# Patient Record
Sex: FEMALE | Race: WHITE | HISPANIC OR LATINO | Employment: UNEMPLOYED | URBAN - METROPOLITAN AREA
[De-identification: names, ages, dates, MRNs, and addresses within clinical notes are randomized per-mention and may not be internally consistent; named-entity substitution may affect disease eponyms.]

---

## 2020-06-28 ENCOUNTER — HOSPITAL ENCOUNTER (EMERGENCY)
Facility: HOSPITAL | Age: 35
Discharge: HOME | End: 2020-06-28
Attending: EMERGENCY MEDICINE | Admitting: EMERGENCY MEDICINE

## 2020-06-28 VITALS
OXYGEN SATURATION: 98 % | WEIGHT: 190 LBS | TEMPERATURE: 97.3 F | HEIGHT: 62 IN | BODY MASS INDEX: 34.96 KG/M2 | DIASTOLIC BLOOD PRESSURE: 100 MMHG | HEART RATE: 95 BPM | SYSTOLIC BLOOD PRESSURE: 144 MMHG | RESPIRATION RATE: 18 BRPM

## 2020-06-28 DIAGNOSIS — F32.A DEPRESSION, UNSPECIFIED DEPRESSION TYPE: ICD-10-CM

## 2020-06-28 DIAGNOSIS — F41.8 SITUATIONAL ANXIETY: Primary | ICD-10-CM

## 2020-06-28 LAB
AMPHET+METHAMPHET UR QL: NEGATIVE
BARBITURATES UR QL SCN: NEGATIVE
BENZODIAZ UR QL SCN: NEGATIVE
CANNABINOIDS SERPL QL: NEGATIVE
COCAINE UR QL: NEGATIVE
ETHANOL BLD-MCNC: <10 MG/DL (ref 0–10)
ETHANOL UR QL: <0.01 %
HCG SERPL QL: NEGATIVE
HOLD SPECIMEN: NORMAL
HOLD SPECIMEN: NORMAL
METHADONE UR QL SCN: NEGATIVE
OPIATES UR QL: NEGATIVE
OXYCODONE UR QL SCN: NEGATIVE
WHOLE BLOOD HOLD SPECIMEN: NORMAL
WHOLE BLOOD HOLD SPECIMEN: NORMAL

## 2020-06-28 PROCEDURE — 90791 PSYCH DIAGNOSTIC EVALUATION: CPT

## 2020-06-28 PROCEDURE — 36415 COLL VENOUS BLD VENIPUNCTURE: CPT

## 2020-06-28 PROCEDURE — 80307 DRUG TEST PRSMV CHEM ANLYZR: CPT

## 2020-06-28 PROCEDURE — 99284 EMERGENCY DEPT VISIT MOD MDM: CPT

## 2020-06-28 PROCEDURE — 84703 CHORIONIC GONADOTROPIN ASSAY: CPT

## 2020-06-28 RX ORDER — LORAZEPAM 1 MG/1
1 TABLET ORAL ONCE
Status: COMPLETED | OUTPATIENT
Start: 2020-06-28 | End: 2020-06-28

## 2020-06-28 RX ORDER — HYDROXYZINE 50 MG/1
50 TABLET, FILM COATED ORAL EVERY 6 HOURS PRN
Qty: 20 TABLET | Refills: 0 | Status: SHIPPED | OUTPATIENT
Start: 2020-06-28 | End: 2021-02-04

## 2020-06-28 RX ADMIN — LORAZEPAM 1 MG: 1 TABLET ORAL at 18:58

## 2021-05-05 ENCOUNTER — OFFICE VISIT (OUTPATIENT)
Dept: OBSTETRICS AND GYNECOLOGY | Facility: CLINIC | Age: 36
End: 2021-05-05

## 2021-05-05 VITALS
SYSTOLIC BLOOD PRESSURE: 126 MMHG | WEIGHT: 205.8 LBS | HEIGHT: 62 IN | DIASTOLIC BLOOD PRESSURE: 78 MMHG | BODY MASS INDEX: 37.87 KG/M2

## 2021-05-05 DIAGNOSIS — Z98.890 HISTORY OF REVERSAL OF TUBAL LIGATION: ICD-10-CM

## 2021-05-05 DIAGNOSIS — Z87.59 H/O ONE MISCARRIAGE: ICD-10-CM

## 2021-05-05 DIAGNOSIS — Z01.419 ROUTINE GYNECOLOGICAL EXAMINATION: Primary | ICD-10-CM

## 2021-05-05 DIAGNOSIS — Z31.9 INFERTILITY MANAGEMENT: ICD-10-CM

## 2021-05-05 LAB
B-HCG UR QL: NEGATIVE
BILIRUB BLD-MCNC: NEGATIVE MG/DL
CLARITY, POC: CLEAR
COLOR UR: YELLOW
GLUCOSE UR STRIP-MCNC: NEGATIVE MG/DL
INTERNAL NEGATIVE CONTROL: NEGATIVE
INTERNAL POSITIVE CONTROL: POSITIVE
KETONES UR QL: NEGATIVE
LEUKOCYTE EST, POC: NEGATIVE
Lab: 55
NITRITE UR-MCNC: NEGATIVE MG/ML
PH UR: 5 [PH] (ref 5–8)
PROT UR STRIP-MCNC: NEGATIVE MG/DL
RBC # UR STRIP: NEGATIVE /UL
SP GR UR: 1 (ref 1–1.03)
UROBILINOGEN UR QL: NORMAL

## 2021-05-05 PROCEDURE — 99203 OFFICE O/P NEW LOW 30 MIN: CPT | Performed by: OBSTETRICS & GYNECOLOGY

## 2021-05-05 PROCEDURE — 81025 URINE PREGNANCY TEST: CPT | Performed by: OBSTETRICS & GYNECOLOGY

## 2021-05-05 RX ORDER — TOPIRAMATE 50 MG/1
TABLET, FILM COATED ORAL
COMMUNITY
End: 2021-05-05

## 2021-05-05 RX ORDER — SELENIUM 50 MCG
TABLET ORAL
COMMUNITY
End: 2021-05-05

## 2021-05-05 NOTE — PROGRESS NOTES
"EVALUATION AND MANAGEMENT ENCOUNTER    Theresa Nagy  Patient new to examiner? no  New problem to examiner? Yes  Patient referred? No    -----------------------------------------------------HISTORY---------------------------------------------------    Chief Complaint:   Chief Complaint   Patient presents with   • Follow-up     infertility       HPI:  Theresa Nagy is a 36 y.o.  with Patient's last menstrual period was 2021 (exact date). here to discuss and manage why she is not getting pregnant.  Pt has had 3 pregnancies before her tubal and a miscarriage after her tubal reversal. Pt had the miscarriage about 8 months ago.  She really wants to get pregnant.    Pt states she is having no other problems.  Her periods are 28 days apart, and is not using an ovulation predictor kit.     History of Present Illness     Theresa Nagy  reports that she has never smoked. She has never used smokeless tobacco..           ROS:  Review of Systems   Constitutional: Negative.    HENT: Negative.    Eyes: Negative.    Respiratory: Negative.    Cardiovascular: Negative.    Gastrointestinal: Negative.    Endocrine: Negative.    Genitourinary: Negative.    Musculoskeletal: Negative.    Skin: Negative.    Allergic/Immunologic: Negative.    Neurological: Negative.    Hematological: Negative.    Psychiatric/Behavioral: Negative.        Patient reports that she is not currently experiencing any symptoms of urinary incontinence.      noTESTED FOR CHLAMYDIA?  -----------------------------------------------PHYSICAL EXAM----------------------------------------------    Vital Signs: /78   Ht 157.5 cm (62.01\")   Wt 93.4 kg (205 lb 12.8 oz)   LMP 2021 (Exact Date)   Breastfeeding No   BMI 37.63 kg/m²    Flowsheet Rows      First Filed Value   Admission Height  157.5 cm (62.01\") Documented at 2021 1353   Admission Weight  93.4 kg (205 lb 12.8 oz) Documented at 2021 1353          Physical Exam  Vitals and nursing " note reviewed.   Constitutional:       Appearance: She is well-developed.   HENT:      Head: Normocephalic and atraumatic.   Cardiovascular:      Rate and Rhythm: Normal rate.   Pulmonary:      Effort: Pulmonary effort is normal.   Abdominal:      General: There is no distension.      Palpations: Abdomen is soft. There is no mass.      Tenderness: There is no abdominal tenderness. There is no guarding.   Genitourinary:     Vagina: No vaginal discharge.   Musculoskeletal:         General: No tenderness or deformity. Normal range of motion.      Cervical back: Normal range of motion.   Skin:     General: Skin is warm and dry.      Coloration: Skin is not pale.      Findings: No erythema or rash.   Neurological:      Mental Status: She is alert and oriented to person, place, and time.   Psychiatric:         Behavior: Behavior normal.         Thought Content: Thought content normal.         Judgment: Judgment normal.         -----------------------------------------------MEDICAL DECISION MAKING-----------------------------        DATA Review & labs ordered:     1.   Lab Results (last 24 hours)     Procedure Component Value Units Date/Time    POC Pregnancy, Urine [[id number]]  (Normal) Collected: 05/05/21 1404    Specimen: Urine Updated: 05/05/21 1405     HCG, Urine, QL Negative     Lot Number [id number]     Internal Positive Control Positive     Internal Negative Control Negative    POC Urinalysis Dipstick [[id number]]  (Normal) Collected: 05/05/21 1404    Specimen: Urine Updated: 05/05/21 1404     Color Yellow     Clarity, UA Clear     Glucose, UA Negative mg/dL      Bilirubin Negative     Ketones, UA Negative     Specific Gravity  1.005     Blood, UA Negative     pH, Urine 5.0     Protein, POC Negative mg/dL      Urobilinogen, UA Normal     Leukocytes Negative     Nitrite, UA Negative        2.   Imaging Results (Last 24 Hours)     ** No results found for the last 24 hours. **        3.   ECG/EMG Results (most recent)     None         4. Old records reviewed? No  5. Old records ordered?  No  6. Labs ordered?: Yes:  urinalysis: clr  7. Imaging other than ultrasound ordered?: No        Reviewed with other physician? no     8. Ultrasound ordered and reviewed? No  9. Diagnoses and/or chronic conditions reviewed:      Diagnoses and all orders for this visit:    1. Routine gynecological examination (Primary)  -     POC Urinalysis Dipstick  -     POC Pregnancy, Urine    2. Infertility management    3. History of reversal of tubal ligation    4. H/O one miscarriage    Other orders  -     clomiPHENE (CLOMID) 50 MG tablet; Take 1 tablet by mouth Daily. days 5-10 each cycle, rpt x 1 month if no conception  Dispense: 10 tablet; Refill: 0        IMPRESSION/PROBLEM:      Proven fertility, with pregnancy after tubal reversal.  No changes in history. prob age is factor.    (Established problem/s? No, worsening? Yes)    (New Problem/s? Yes, additional workup planned? No)      PLAN:     1. Clomid x 2 cycles.   2. Call after 2nd cycle.    Pt to call for any results from testing promptly if she does not hear from us.     RTO Return if symptoms worsen or fail to improve. .  Instructions and precautions given.     TIME: More than 50% of time spent in counseling and/or coordination of care.Time spent in counseling 30 min.  Counseling included the following topics infertility with options for expectant management v. Clomid (realizing that it is an empiric start due to age and no other workup has been done with proven tubal patency) v. RE referral with prognosis, differential diagnosis, risks, benefits of treatment, instructions, compliance and/or risk reduction and alternatives.       Wilfredo Delcid MD  16:54 EDT  05/05/21

## 2021-06-22 ENCOUNTER — OFFICE VISIT (OUTPATIENT)
Dept: OBSTETRICS AND GYNECOLOGY | Facility: CLINIC | Age: 36
End: 2021-06-22

## 2021-06-22 VITALS
SYSTOLIC BLOOD PRESSURE: 128 MMHG | BODY MASS INDEX: 40.72 KG/M2 | DIASTOLIC BLOOD PRESSURE: 72 MMHG | HEIGHT: 62 IN | WEIGHT: 221.3 LBS

## 2021-06-22 DIAGNOSIS — O20.0 THREATENED ABORTION: ICD-10-CM

## 2021-06-22 DIAGNOSIS — N92.6 MISSED MENSES: Primary | ICD-10-CM

## 2021-06-22 DIAGNOSIS — O09.529 ANTEPARTUM MULTIGRAVIDA OF ADVANCED MATERNAL AGE: ICD-10-CM

## 2021-06-22 LAB
B-HCG UR QL: POSITIVE
BILIRUB BLD-MCNC: NEGATIVE MG/DL
CLARITY, POC: CLEAR
COLOR UR: YELLOW
GLUCOSE UR STRIP-MCNC: NEGATIVE MG/DL
INTERNAL NEGATIVE CONTROL: ABNORMAL
INTERNAL POSITIVE CONTROL: ABNORMAL
KETONES UR QL: NEGATIVE
LEUKOCYTE EST, POC: NEGATIVE
Lab: 55
NITRITE UR-MCNC: NEGATIVE MG/ML
PH UR: 5 [PH] (ref 5–8)
PROT UR STRIP-MCNC: NEGATIVE MG/DL
RBC # UR STRIP: ABNORMAL /UL
SP GR UR: 1 (ref 1–1.03)
UROBILINOGEN UR QL: NORMAL

## 2021-06-22 PROCEDURE — 99214 OFFICE O/P EST MOD 30 MIN: CPT | Performed by: OBSTETRICS & GYNECOLOGY

## 2021-06-22 PROCEDURE — 81025 URINE PREGNANCY TEST: CPT | Performed by: OBSTETRICS & GYNECOLOGY

## 2021-06-22 RX ORDER — PROGESTERONE 200 MG/1
200 CAPSULE ORAL DAILY
Qty: 90 CAPSULE | Refills: 3 | Status: SHIPPED | OUTPATIENT
Start: 2021-06-22 | End: 2021-08-16

## 2021-06-22 RX ORDER — ONDANSETRON 4 MG/1
4 TABLET, FILM COATED ORAL
COMMUNITY
Start: 2021-05-24 | End: 2021-08-16

## 2021-06-22 NOTE — PROGRESS NOTES
OB CONFIRMATION APPOINTMENT    CC- Pt has had a menstrual irregularity    Chief Complaint   Patient presents with   • Amenorrhea     LMP-21        HISTORY OF PRESENT ILLNESS:    Amenorrhea  This is a new problem. The current episode started more than 1 month ago. The problem occurs constantly. The problem has been unchanged. Associated symptoms include fatigue. Pertinent negatives include no abdominal pain or urinary symptoms. Associated symptoms comments: Vaginal bleeding. Nothing aggravates the symptoms. She has tried nothing for the symptoms.       Theresa Nagy is being seen today to confirm she is pregnant.    She is a 36 y.o.  Patient's last menstrual period was 2021..  EDC= 22.  Gestational age is 8+wks     Current obstetric complaints : vaginal bleeding     Prior obstetric issues, potential pregnancy concerns: miscarriage    Family history of genetic issues (includes FOB): none    OFFERED GENETIC TESTING: CfDNA, Cystic fibrosis, Spinal muscular atrophy, Fragile X syndrome: yes    Prior infections concerning in pregnancy (Rash, fever in last 2 weeks): no    HISTORY REVIEWED:      Past Medical History:   Diagnosis Date   • Anxiety    • Depression        Past Surgical History:   Procedure Laterality Date   •  SECTION     • CHOLECYSTECTOMY     • TUBAL ABDOMINAL LIGATION      and reversal         Current Outpatient Medications:   •  ondansetron (ZOFRAN) 4 MG tablet, 4 mg., Disp: , Rfl:   •  Progesterone (Prometrium) 200 MG capsule, Insert 1 capsule into the vagina Daily., Disp: 90 capsule, Rfl: 3    No Known Allergies    Social History     Socioeconomic History   • Marital status: Single     Spouse name: Not on file   • Number of children: Not on file   • Years of education: Not on file   • Highest education level: Not on file   Tobacco Use   • Smoking status: Never Smoker   • Smokeless tobacco: Never Used   Vaping Use   • Vaping Use: Never used   Substance and Sexual Activity   •  "Alcohol use: Never   • Drug use: Never   • Sexual activity: Never       History reviewed. No pertinent family history.    REVIEW OF SYSTEMS:     Review of Systems   Constitutional: Positive for fatigue.   HENT: Negative.    Eyes: Negative.    Respiratory: Negative.    Cardiovascular: Negative.    Gastrointestinal: Negative.  Negative for abdominal pain.   Endocrine: Negative.    Genitourinary: Positive for menstrual problem and vaginal bleeding.   Musculoskeletal: Negative.    Skin: Negative.    Allergic/Immunologic: Negative.    Neurological: Negative.    Hematological: Negative.    Psychiatric/Behavioral: Negative.        Physical Exam     Physical Exam  Vitals and nursing note reviewed.   Constitutional:       Appearance: She is well-developed.   HENT:      Head: Normocephalic and atraumatic.   Cardiovascular:      Rate and Rhythm: Normal rate.   Pulmonary:      Effort: Pulmonary effort is normal.   Abdominal:      General: There is no distension.      Palpations: Abdomen is soft. There is no mass.      Tenderness: There is no abdominal tenderness. There is no guarding.   Genitourinary:     Vagina: No vaginal discharge.   Musculoskeletal:         General: No tenderness or deformity. Normal range of motion.      Cervical back: Normal range of motion.   Skin:     General: Skin is warm and dry.      Coloration: Skin is not pale.      Findings: No erythema or rash.   Neurological:      Mental Status: She is alert and oriented to person, place, and time.   Psychiatric:         Behavior: Behavior normal.         Thought Content: Thought content normal.         Judgment: Judgment normal.             Objective    /72   Ht 157.5 cm (62.01\")   Wt 100 kg (221 lb 4.8 oz)   LMP 04/23/2021   Breastfeeding No   BMI 40.47 kg/m²     Theresa Nagy  reports that she has never smoked. She has never used smokeless tobacco..        U/S:  + cardiac, no hemorrhage.        Assessment    1) Pregnancy at Unknown   Diagnoses and " all orders for this visit:    1. Missed menses (Primary)  -     POC Urinalysis Dipstick  -     POC Pregnancy, Urine    2. Antepartum multigravida of advanced maternal age    3. Threatened     Other orders  -     Progesterone (Prometrium) 200 MG capsule; Insert 1 capsule into the vagina Daily.  Dispense: 90 capsule; Refill: 3         Plan    Patient is on Prenatal vitamins  Problem list reviewed and updated.  Reviewed routine prenatal care with the patient  Zika (travel restrictions/ok to use insect repellant), not to changing cat litter, food restrictions, avoidance of alcohol, tobacco and drugs and saunas/hot tubs.   All questions answered.     I spent 30+ minutes caring for Theresa on this date of service. This time includes time spent by me in the following activities: preparing for the visit, reviewing tests, obtaining and/or reviewing a separately obtained history, performing a medically appropriate examination and/or evaluation, counseling and educating the patient/family/caregiver, ordering medications, tests, or procedures, referring and communicating with other health care professionals, documenting information in the medical record, independently interpreting results and communicating that information with the patient/family/caregiver, care coordination and being present in the u/s for the u/s procedure and explaining results and findings in real time.       RTO Return in about 2 weeks (around 2021) for ob phys w/ pap.    Wilfredo Delcid MD    2021  10:28 EDT

## 2021-07-12 ENCOUNTER — INITIAL PRENATAL (OUTPATIENT)
Dept: OBSTETRICS AND GYNECOLOGY | Facility: CLINIC | Age: 36
End: 2021-07-12

## 2021-07-12 VITALS — WEIGHT: 219 LBS | SYSTOLIC BLOOD PRESSURE: 122 MMHG | BODY MASS INDEX: 40.05 KG/M2 | DIASTOLIC BLOOD PRESSURE: 78 MMHG

## 2021-07-12 DIAGNOSIS — O09.529 ANTEPARTUM MULTIGRAVIDA OF ADVANCED MATERNAL AGE: ICD-10-CM

## 2021-07-12 DIAGNOSIS — Z11.51 SPECIAL SCREENING EXAMINATION FOR HUMAN PAPILLOMAVIRUS (HPV): ICD-10-CM

## 2021-07-12 DIAGNOSIS — Z87.59 HISTORY OF GESTATIONAL HYPERTENSION: ICD-10-CM

## 2021-07-12 DIAGNOSIS — Z34.91 INITIAL OBSTETRIC VISIT IN FIRST TRIMESTER: Primary | ICD-10-CM

## 2021-07-12 DIAGNOSIS — Z87.59 H/O ONE MISCARRIAGE: ICD-10-CM

## 2021-07-12 DIAGNOSIS — Z01.419 PAP SMEAR, LOW-RISK: ICD-10-CM

## 2021-07-12 DIAGNOSIS — Z36.9 ENCOUNTER FOR ANTENATAL SCREENING, UNSPECIFIED: ICD-10-CM

## 2021-07-12 LAB
ALBUMIN SERPL-MCNC: 4.3 G/DL (ref 3.5–5.2)
ALBUMIN/GLOB SERPL: 1.6 G/DL
ALP SERPL-CCNC: 65 U/L (ref 39–117)
ALT SERPL-CCNC: 33 U/L (ref 1–33)
AST SERPL-CCNC: 17 U/L (ref 1–32)
BILIRUB SERPL-MCNC: 0.2 MG/DL (ref 0–1.2)
BUN SERPL-MCNC: 7 MG/DL (ref 6–20)
BUN/CREAT SERPL: 13.2 (ref 7–25)
CALCIUM SERPL-MCNC: 9.7 MG/DL (ref 8.6–10.5)
CHLORIDE SERPL-SCNC: 101 MMOL/L (ref 98–107)
CO2 SERPL-SCNC: 23.3 MMOL/L (ref 22–29)
CREAT SERPL-MCNC: 0.53 MG/DL (ref 0.57–1)
GLOBULIN SER CALC-MCNC: 2.7 GM/DL
GLUCOSE SERPL-MCNC: 96 MG/DL (ref 65–99)
GLUCOSE UR STRIP-MCNC: NEGATIVE MG/DL
POTASSIUM SERPL-SCNC: 3.9 MMOL/L (ref 3.5–5.2)
PROT SERPL-MCNC: 7 G/DL (ref 6–8.5)
PROT UR STRIP-MCNC: NEGATIVE MG/DL
SODIUM SERPL-SCNC: 137 MMOL/L (ref 136–145)
TSH SERPL DL<=0.005 MIU/L-ACNC: 2.51 UIU/ML (ref 0.27–4.2)

## 2021-07-12 PROCEDURE — 99214 OFFICE O/P EST MOD 30 MIN: CPT | Performed by: NURSE PRACTITIONER

## 2021-07-12 NOTE — PATIENT INSTRUCTIONS
Prenatal Care  Prenatal care is health care during pregnancy. It helps you and your unborn baby (fetus) stay as healthy as possible. Prenatal care may be provided by a midwife, a family practice health care provider, or a childbirth and pregnancy specialist (obstetrician).  How does this affect me?  During pregnancy, you will be closely monitored for any new conditions that might develop. To lower your risk of pregnancy complications, you and your health care provider will talk about any underlying conditions you have.  How does this affect my baby?  Early and consistent prenatal care increases the chance that your baby will be healthy during pregnancy. Prenatal care lowers the risk that your baby will be:  · Born early (prematurely).  · Smaller than expected at birth (small for gestational age).  What can I expect at the first prenatal care visit?  Your first prenatal care visit will likely be the longest. You should schedule your first prenatal care visit as soon as you know that you are pregnant. Your first visit is a good time to talk about any questions or concerns you have about pregnancy. At your visit, you and your health care provider will talk about:  · Your medical history, including:  ? Any past pregnancies.  ? Your family's medical history.  ? The baby's father's medical history.  ? Any long-term (chronic) health conditions you have and how you manage them.  ? Any surgeries or procedures you have had.  ? Any current over-the-counter or prescription medicines, herbs, or supplements you are taking.  · Other factors that could pose a risk to your baby, including:  · Your home setting and your stress levels, including:  ? Exposure to abuse or violence.  ? Household financial strain.  ? Mental health conditions you have.  · Your daily health habits, including diet and exercise.  Your health care provider will also:  · Measure your weight, height, and blood pressure.  · Do a physical exam, including a pelvic  and breast exam.  · Perform blood tests and urine tests to check for:  ? Urinary tract infection.  ? Sexually transmitted infections (STIs).  ? Low iron levels in your blood (anemia).  ? Blood type and certain proteins on red blood cells (Rh antibodies).  ? Infections and immunity to viruses, such as hepatitis B and rubella.  ? HIV (human immunodeficiency virus).  · Do an ultrasound to confirm your baby's growth and development and to help predict your estimated due date (MARCUS). This ultrasound is done with a probe that is inserted into the vagina (transvaginal ultrasound).  · Discuss your options for genetic screening.  · Give you information about how to keep yourself and your baby healthy, including:  ? Nutrition and taking vitamins.  ? Physical activity.  ? How to manage pregnancy symptoms such as nausea and vomiting (morning sickness).  ? Infections and substances that may be harmful to your baby and how to avoid them.  ? Food safety.  ? Dental care.  ? Working.  ? Travel.  ? Warning signs to watch for and when to call your health care provider.  How often will I have prenatal care visits?  After your first prenatal care visit, you will have regular visits throughout your pregnancy. The visit schedule is often as follows:  · Up to week 28 of pregnancy: once every 4 weeks.  · 28-36 weeks: once every 2 weeks.  · After 36 weeks: every week until delivery.  Some women may have visits more or less often depending on any underlying health conditions and the health of the baby.  Keep all follow-up and prenatal care visits as told by your health care provider. This is important.  What happens during routine prenatal care visits?  Your health care provider will:  · Measure your weight and blood pressure.  · Check for fetal heart sounds.  · Measure the height of your uterus in your abdomen (fundal height). This may be measured starting around week 20 of pregnancy.  · Check the position of your baby inside your  uterus.  · Ask questions about your diet, sleeping patterns, and whether you can feel the baby move.  · Review warning signs to watch for and signs of labor.  · Ask about any pregnancy symptoms you are having and how you are dealing with them. Symptoms may include:  ? Headaches.  ? Nausea and vomiting.  ? Vaginal discharge.  ? Swelling.  ? Fatigue.  ? Constipation.  ? Any discomfort, including back or pelvic pain.  Make a list of questions to ask your health care provider at your routine visits.  What tests might I have during prenatal care visits?  You may have blood, urine, and imaging tests throughout your pregnancy, such as:  · Urine tests to check for glucose, protein, or signs of infection.  · Glucose tests to check for a form of diabetes that can develop during pregnancy (gestational diabetes mellitus). This is usually done around week 24 of pregnancy.  · An ultrasound to check your baby's growth and development and to check for birth defects. This is usually done around week 20 of pregnancy.  · A test to check for group B strep (GBS) infection. This is usually done around week 36 of pregnancy.  · Genetic testing. This may include blood or imaging tests, such as an ultrasound. Some genetic tests are done during the first trimester and some are done during the second trimester.  What else can I expect during prenatal care visits?  Your health care provider may recommend getting certain vaccines during pregnancy. These may include:  · A yearly flu shot (annual influenza vaccine). This is especially important if you will be pregnant during flu season.  · Tdap (tetanus, diphtheria, pertussis) vaccine. Getting this vaccine during pregnancy can protect your baby from whooping cough (pertussis) after birth. This vaccine may be recommended between weeks 27 and 36 of pregnancy.  Later in your pregnancy, your health care provider may give you information about:  · Childbirth and breastfeeding classes.  · Choosing a  health care provider for your baby.  · Umbilical cord banking.  · Breastfeeding.  · Birth control after your baby is born.  · The hospital labor and delivery unit and how to tour it.  · Registering at the hospital before you go into labor.  Where to find more information  · Office on Women's Health: womenshealth.gov  · American Pregnancy Association: americanpregnancy.org  · March of Dimes: marchofdimes.org  Summary  · Prenatal care helps you and your baby stay as healthy as possible during pregnancy.  · Your first prenatal care visit will most likely be the longest.  · You will have visits and tests throughout your pregnancy to monitor your health and your baby's health.  · Bring a list of questions to your visits to ask your health care provider.  · Make sure to keep all follow-up and prenatal care visits with your health care provider.  This information is not intended to replace advice given to you by your health care provider. Make sure you discuss any questions you have with your health care provider.  Document Released: 12/20/2004 Document Revised: 12/17/2018 Document Reviewed: 12/17/2018  [employer] Interactive Patient Education © 2019 [employer]      Tests and Screening During Pregnancy  Having certain tests and screenings during pregnancy is an important part of your prenatal care. These tests help your health care provider find problems that might affect your pregnancy. Some tests are done for all pregnant women, and some are optional. Most of the tests and screenings do not pose any risks for you or your baby. You may need additional testing if any routine tests indicate a problem.  Tests and screenings done in early pregnancy  Some tests and screenings you can expect to have in early pregnancy include:  · Blood tests, such as:  ? Complete blood count (CBC). This test is done to check your red and white blood cells. It can help identify a risk for anemia, infection, or bleeding.  ? Blood typing. This  test determines your blood type as well as whether you have a certain protein in your red blood cells (Rh factor). If you do not have this protein (Rh negative) and your baby does have it (Rh positive), your body could make antibodies to the Rh factor. This could be dangerous to your baby's health.  ? Tests to check for diseases that can cause birth defects or can be passed to your baby, such as:  § [place] measles (rubella). The test indicates whether you are immune to rubella.  § Hepatitis B and C. All women are tested for hepatitis B. You may also be tested for hepatitis C if you have risk factors for the condition.  § Zika virus infection. You may have a blood or urine test to check for this infection if you or your partner has traveled to an area where the virus occurs.  · Urine testing. A urine sample can be tested for diabetes, protein in your urine, and signs of infection.  · Testing for sexually transmitted infections (STIs), such as HIV, syphilis, and chlamydia.  · Testing for tuberculosis. You may have this skin test if you are at risk for tuberculosis.  · Fetal ultrasound. This is an imaging study of your developing baby. It is done using sound waves and a computer. This test may be done at 11-14 weeks to confirm your pregnancy and help determine your due date.  Tests and screenings done later in pregnancy  Certain tests are done for the first time during later pregnancy. In addition, some of the tests that were done in early pregnancy are repeated at this time. Some common tests you can expect to have later in pregnancy include:  · Rh antibody testing. If you are Rh negative, you will have a blood test at about 28 weeks of pregnancy to see if you are producing Rh antibodies. If you have not started to make antibodies, you will be given an injection to prevent you from making antibodies for the rest of your pregnancy.  · Glucose screening. This tests your blood sugar to find out whether you are developing  the type of diabetes that occurs during pregnancy (gestational diabetes). You may have this screening earlier if you have risk factors for diabetes.  · Screening for group B streptococcus (GBS). GBS is a type of bacteria that may live in your rectum or vagina. You may have GBS without any symptoms. GBS can spread to your baby during birth. This test involves doing a rectal and vaginal swab at 35-37 weeks of pregnancy. If testing is positive for GBS, you may be treated with antibiotic medicine.  · CBC to check for anemia and blood-clotting ability.  · Urine tests to check for protein, which can be a sign of a condition called preeclampsia.  · Fetal ultrasound. This may be repeated at 16-20 weeks to check how your baby is growing and developing.  Screening for birth defects  Some birth defects are caused by abnormal genes passed down through families. Early in your pregnancy, tests can be done to find out if your baby is at risk for a genetic disorder. This testing is optional. The type of testing recommended for you will depend on your family and medical history, your ethnicity, and your age. Testing may include:  · Screening tests. These tests may include an ultrasound, blood tests, or a combination of both. The blood tests are used to check for abnormal genes, and the ultrasound is done to look for early birth defects.  · Carrier screening. This test involves checking the blood or saliva of both parents to see if they carry abnormal genes that could be passed down to a baby.  If genetic screening shows that your baby is at risk for a genetic defect, additional diagnostic testing may be recommended, such as:  · Amniocentesis. This involves testing a sample of fluid from your womb (amniotic fluid).  · Chorionic villus sampling. In this test, a sample of cells from your placenta is checked for abnormal cells.  Unlike other tests done during pregnancy, diagnostic testing does have some risk for your pregnancy. Talk to  your health care provider about the risks and benefits of genetic testing.  Where to find more information  · American Pregnancy Association: americanpregnancy.org/prenatal-testing  · Office on Women's Health: womenshealth.gov/pregnancy  · March of Dimes: marchofdimes.org/pregnancy  Questions to ask your health care provider  · What routine tests are recommended for me?  · When and how will these tests be done?  · When will I get the results of routine tests?  · What do the results of these tests mean for me or my baby?  · Do you recommend any genetic screening tests? Which ones?  · Should I see a genetic counselor before having genetic screening?  Summary  · Having tests and screenings during pregnancy is an important part of your prenatal care.  · In early pregnancy, testing may be done to check blood type, Rh status, and risks for various conditions that can affect your baby.  · Fetal ultrasound may be done in early pregnancy to confirm a pregnancy and later to look for any birth defects.  · Later in pregnancy, tests may include screening for GBS and gestational diabetes.  · Genetic testing is optional. Consider talking to a genetic counselor about this testing.  This information is not intended to replace advice given to you by your health care provider. Make sure you discuss any questions you have with your health care provider.  Document Released: 03/04/2019 Document Revised: 03/04/2019 Document Reviewed: 03/04/2019  [employer]vier Interactive Patient Education © 2019 [employer]      How a Baby Grows During Pregnancy    Pregnancy begins when a male's sperm enters a female's egg (fertilization). Fertilization usually happens in one of the tubes (fallopian tubes) that connect the ovaries to the womb (uterus). The fertilized egg moves down the fallopian tube to the uterus. Once it reaches the uterus, it implants into the lining of the uterus and begins to grow.  For the first 10 weeks, the fertilized egg is called  an embryo. After 10 weeks, it is called a fetus. As the fetus continues to grow, it receives oxygen and nutrients through tissue (placenta) that grows to support the developing baby. The placenta is the life support system for the baby. It provides oxygen and nutrition and removes waste.  Learning as much as you can about your pregnancy and how your baby is developing can help you enjoy the experience. It can also make you aware of when there might be a problem and when to ask questions.  How long does a typical pregnancy last?  A pregnancy usually lasts 280 days, or about 40 weeks. Pregnancy is divided into three periods of growth, also called trimesters:  · First trimester: 0-12 weeks.  · Second trimester: 13-27 weeks.  · Third trimester: 28-40 weeks.  The day when your baby is ready to be born (full term) is your estimated date of delivery.  How does my baby develop month by month?  First month  · The fertilized egg attaches to the inside of the uterus.  · Some cells will form the placenta. Others will form the fetus.  · The arms, legs, brain, spinal cord, lungs, and heart begin to develop.  · At the end of the first month, the heart begins to beat.  Second month  · The bones, inner ear, eyelids, hands, and feet form.  · The genitals develop.  · By the end of 8 weeks, all major organs are developing.  Third month  · All of the internal organs are forming.  · Teeth develop below the gums.  · Bones and muscles begin to grow. The spine can flex.  · The skin is transparent.  · Fingernails and toenails begin to form.  · Arms and legs continue to grow longer, and hands and feet develop.  · The fetus is about 3 inches (7.6 cm) long.  Fourth month  · The placenta is completely formed.  · The external sex organs, neck, outer ear, eyebrows, eyelids, and fingernails are formed.  · The fetus can hear, swallow, and move its arms and legs.  · The kidneys begin to produce urine.  · The skin is covered with a white, waxy coating  (vernix) and very fine hair (lanugo).  Fifth month  · The fetus moves around more and can be felt for the first time (quickening).  · The fetus starts to sleep and wake up and may begin to suck its finger.  · The nails grow to the end of the fingers.  · The organ in the digestive system that makes bile (gallbladder) functions and helps to digest nutrients.  · If your baby is a girl, eggs are present in her ovaries. If your baby is a boy, testicles start to move down into his scrotum.  Sixth month  · The lungs are formed.  · The eyes open. The brain continues to develop.  · Your baby has fingerprints and toe prints. Your baby's hair grows thicker.  · At the end of the second trimester, the fetus is about 9 inches (22.9 cm) long.  Seventh month  · The fetus kicks and stretches.  · The eyes are developed enough to sense changes in light.  · The hands can make a grasping motion.  · The fetus responds to sound.  Eighth month  · All organs and body systems are fully developed and functioning.  · Bones harden, and taste buds develop. The fetus may hiccup.  · Certain areas of the brain are still developing. The skull remains soft.  Ninth month  · The fetus gains about ½ lb (0.23 kg) each week.  · The lungs are fully developed.  · Patterns of sleep develop.  · The fetus's head typically moves into a head-down position (vertex) in the uterus to prepare for birth.  · The fetus weighs 6-9 lb (2.72-4.08 kg) and is 19-20 inches (48.26-50.8 cm) long.  What can I do to have a healthy pregnancy and help my baby develop?  General instructions  · Take prenatal vitamins as directed by your health care provider. These include vitamins such as folic acid, iron, calcium, and vitamin D. They are important for healthy development.  · Take medicines only as directed by your health care provider. Read labels and ask a pharmacist or your health care provider whether over-the-counter medicines, supplements, and prescription drugs are safe to  take during pregnancy.  · Keep all follow-up visits as directed by your health care provider. This is important. Follow-up visits include prenatal care and screening tests.  How do I know if my baby is developing well?  At each prenatal visit, your health care provider will do several different tests to check on your health and keep track of your baby's development. These include:  · Fundal height and position.  ? Your health care provider will measure your growing belly from your pubic bone to the top of the uterus using a tape measure.  ? Your health care provider will also feel your belly to determine your baby's position.  · Heartbeat.  ? An ultrasound in the first trimester can confirm pregnancy and show a heartbeat, depending on how far along you are.  ? Your health care provider will check your baby's heart rate at every prenatal visit.  · Second trimester ultrasound.  ? This ultrasound checks your baby's development. It also may show your baby's gender.  What should I do if I have concerns about my baby's development?  Always talk with your health care provider about any concerns that you may have about your pregnancy and your baby.  Summary  · A pregnancy usually lasts 280 days, or about 40 weeks. Pregnancy is divided into three periods of growth, also called trimesters.  · Your health care provider will monitor your baby's growth and development throughout your pregnancy.  · Follow your health care provider's recommendations about taking prenatal vitamins and medicines during your pregnancy.  · Talk with your health care provider if you have any concerns about your pregnancy or your developing baby.  This information is not intended to replace advice given to you by your health care provider. Make sure you discuss any questions you have with your health care provider.  Document Released: 06/05/2009 Document Revised: 10/31/2018 Document Reviewed: 10/31/2018  [employer]vier Interactive Patient Education © 2019  [employer].    Immunizations and Pregnancy  Immunizations, or vaccines, can help to keep you healthy. They can also protect your baby from some diseases until your baby is old enough to safely receive them. If you are pregnant or you are planning a pregnancy, the vaccines that you need are determined by:  · Your age.  · Your lifestyle.  · Your medical history.  · Your travel plans.  · Your previous vaccines.  The benefits of receiving immunizations during pregnancy usually outweigh the risks:  · When the risk of being exposed to a disease is high.  · When infection would pose a risk to you or your unborn baby.  · When the vaccine is not likely to cause harm.  Should I receive immunizations before pregnancy?  If possible, make sure that your vaccines are up to date before you become pregnant. It is safe and important for you to receive weakened viral and weakened bacterial (inactivated) vaccines, as needed, before you are pregnant. Live viral and live bacterial (attenuated) vaccines should be given 1 month or more before pregnancy. Some examples of attenuated vaccines include:  · Live attenuated influenza vaccine (LAIV).  · Measles, mumps, and rubella (MMR).  · Measles, mumps, rubella, and varicella (MMRV).  · Rotavirus (RV5 or RV1).  · Smallpox.  · Typhoid (Ty21a, oral capsule form of the vaccine).  · Varicella (VIVIEN).  · Shingles.  · Yellow fever (YF).  If you become pregnant within 1 month after you have received an attenuated vaccine, contact your health care provider.  Should I receive immunizations during pregnancy?  It is safe and important for you to receive inactivated vaccines as needed during pregnancy. Until your baby can receive vaccines, your baby will get some protection from diseases through the vaccines that you receive while you are pregnant.  However, some inactivated vaccines have not been thoroughly studied in pregnant women, and at this time, they are not recommended during pregnancy unless the  "benefits outweigh the risks. One example is the pneumococcal polysaccharide vaccine (PPSV23). In addition, the human papillomavirus (HPV4 or HPV2) vaccine is not recommended during pregnancy.  You should receive inactivated influenza (IIV) and adult tetanus, diphtheria, and acellular pertussis (Tdap) vaccines during your pregnancy. The IIV, which is known as \"the flu shot,\" will protect you and your baby (up to 6 months of age) from some complications and strains of influenza. Pregnant women can receive IIV at any time and during any trimester. The Tdap vaccine will help to prevent whooping cough (pertussis) in you and your baby. You should receive 1 dose of this vaccine during each pregnancy. It is recommended that pregnant women receive this vaccine during the 27th-36th weeks of pregnancy.  Usually, attenuated vaccines are not given to pregnant women. There is a possible risk of passing the vaccine virus or bacteria to the unborn baby. If you are pregnant and you received an attenuated vaccine, contact your health care provider.  Should I receive immunizations after pregnancy?  It is safe and important for you to receive vaccines as needed after pregnancy. This is true even if you are breastfeeding. If you did not receive the Tdap vaccine during your pregnancy, you should receive that vaccine right after you give birth to your baby (delivery). If you are not immune to measles, mumps, rubella, or varicella, you should receive the MMR or MMRV vaccine within days after delivery. Most other vaccines are also safe to receive after pregnancy.  What if I am pregnant and I plan to travel internationally?  If you are pregnant and you are planning to travel internationally, talk with your health care provider at least 4-6 weeks before your trip. Discuss precautions or vaccine options. Before you receive vaccines, the risk of disease and immunization should always be determined.  Immunizations that are recommended for " "pregnant international travelers include:  · Hepatitis B (HepB).  · IIV.  · Tetanus and diphtheria (Td) or Tdap.  · Hepatitis A (HepA).  Immunizations that should be delayed or given only when benefits outweigh the risk of disease exposure for pregnant international travelers include:  · Spanish encephalitis (JE).  · Meningococcal meningitis (MPSV4 or MCV4).  · PPSV23.  · Inactivated polio (IPV).  · Rabies.  · Typhoid.  · YF.  Immunizations that should not be given to pregnant international travelers include:  · Tuberculosis (BCG).  · MMR.  · MMRV.  · HPV4 or HPV2.  · VIVIEN.  · LAIV.  This information is not intended to replace advice given to you by your health care provider. Make sure you discuss any questions you have with your health care provider.  Document Released: 01/06/2009 Document Revised: 05/19/2017 Document Reviewed: 01/25/2016  [employer] Interactive Patient Education © 2019 [employer]      Eating Plan for Pregnant Women  While you are pregnant, your body requires additional nutrition to help support your growing baby. You also have a higher need for some vitamins and minerals, such as folic acid, calcium, iron, and vitamin D. Eating a healthy, well-balanced diet is very important for your health and your baby's health. Your need for extra calories varies for the three 3-month segments of your pregnancy (trimesters). For most women, it is recommended to consume:  · 150 extra calories a day during the first trimester.  · 300 extra calories a day during the second trimester.  · 300 extra calories a day during the third trimester.  What are tips for following this plan?    · Do not try to lose weight or go on a diet during pregnancy.  · Limit your overall intake of foods that have \"empty calories.\" These are foods that have little nutritional value, such as sweets, desserts, candies, and sugar-sweetened beverages.  · Eat a variety of foods (especially fruits and vegetables) to get a full range of vitamins " and minerals.  · Take a prenatal vitamin to help meet your additional vitamin and mineral needs during pregnancy, specifically for folic acid, iron, calcium, and vitamin D.  · Remember to stay active. Ask your health care provider what types of exercise and activities are safe for you.  · Practice good food safety and cleanliness. Wash your hands before you eat and after you prepare raw meat. Wash all fruits and vegetables well before peeling or eating. Taking these actions can help to prevent food-borne illnesses that can be very dangerous to your baby, such as listeriosis. Ask your health care provider for more information about listeriosis.  What does 150 extra calories look like?  Healthy options that provide 150 extra calories each day could be any of the following:  · 6-8 oz (170-230 g) of plain low-fat yogurt with ½ cup of berries.  · 1 apple with 2 teaspoons (11 g) of peanut butter.  · Cut-up vegetables with ¼ cup (60 g) of hummus.  · 8 oz (230 mL) or 1 cup of low-fat chocolate milk.  · 1 stick of string cheese with 1 medium orange.  · 1 peanut butter and jelly sandwich that is made with one slice of whole-wheat bread and 1 tsp (5 g) of peanut butter.  For 300 extra calories, you could eat two of those healthy options each day.  What is a healthy amount of weight to gain?  The right amount of weight gain for you is based on your BMI before you became pregnant. If your BMI:  · Was less than 18 (underweight), you should gain 28-40 lb (13-18 kg).  · Was 18-24.9 (normal), you should gain 25-35 lb (11-16 kg).  · Was 25-29.9 (overweight), you should gain 15-25 lb (7-11 kg).  · Was 30 or greater (obese), you should gain 11-20 lb (5-9 kg).  What if I am having twins or multiples?  Generally, if you are carrying twins or multiples:  · You may need to eat 300-600 extra calories a day.  · The recommended range for total weight gain is 25-54 lb (11-25 kg), depending on your BMI before pregnancy.  · Talk with your health  "care provider to find out about nutritional needs, weight gain, and exercise that is right for you.  What foods can I eat?    Grains  All grains. Choose whole grains, such as whole-wheat bread, oatmeal, or brown rice.  Vegetables  All vegetables. Eat a variety of colors and types of vegetables. Remember to wash your vegetables well before peeling or eating.  Fruits  All fruits. Eat a variety of colors and types of fruit. Remember to wash your fruits well before peeling or eating.  Meats and other protein foods  Lean meats, including chicken, turkey, fish, and lean cuts of beef, veal, or pork. If you eat fish or seafood, choose options that are higher in omega-3 fatty acids and lower in mercury, such as salmon, herring, mussels, trout, sardines, pollock, shrimp, crab, and lobster. Tofu. Tempeh. Beans. Eggs. Peanut butter and other nut butters. Make sure that all meats, poultry, and eggs are cooked to food-safe temperatures or \"well-done.\"  Two or more servings of fish are recommended each week in order to get the most benefits from omega-3 fatty acids that are found in seafood. Choose fish that are lower in mercury. You can find more information online:  · www.fda.gov  Dairy  Pasteurized milk and milk alternatives (such as almond milk). Pasteurized yogurt and pasteurized cheese. Cottage cheese. Sour cream.  Beverages  Water. Juices that contain 100% fruit juice or vegetable juice. Caffeine-free teas and decaffeinated coffee.  Drinks that contain caffeine are okay to drink, but it is better to avoid caffeine. Keep your total caffeine intake to less than 200 mg each day (which is 12 oz or 355 mL of coffee, tea, or soda) or the limit as told by your health care provider.  Fats and oils  Fats and oils are okay to include in moderation.  Sweets and desserts  Sweets and desserts are okay to include in moderation.  Seasoning and other foods  All pasteurized condiments.  The items listed above may not be a complete list of " recommended foods and beverages. Contact your dietitian for more options.  What foods are not recommended?  Vegetables  Raw (unpasteurized) vegetable juices.  Fruits  Unpasteurized fruit juices.  Meats and other protein foods  Lunch meats, bologna, hot dogs, or other deli meats. (If you must eat those meats, reheat them until they are steaming hot.) Refrigerated paté, meat spreads from a meat counter, smoked seafood that is found in the refrigerated section of a store. Raw or undercooked meats, poultry, and eggs. Raw fish, such as sushi or sashimi. Fish that have high mercury content, such as tilefish, shark, swordfish, and megha mackerel.  To learn more about mercury in fish, talk with your health care provider or look for online resources, such as:  · www.fda.gov  Dairy  Raw (unpasteurized) milk and any foods that have raw milk in them. Soft cheeses, such as feta, queso chandra, queso fresco, Brie, Camembert cheeses, blue-veined cheeses, and Panela cheese (unless it is made with pasteurized milk, which must be stated on the label).  Beverages  Alcohol. Sugar-sweetened beverages, such as sodas, teas, or energy drinks.  Seasoning and other foods  Homemade fermented foods and drinks, such as pickles, sauerkraut, or kombucha drinks. (Store-bought pasteurized versions of these are okay.)  Salads that are made in a store or deli, such as ham salad, chicken salad, egg salad, tuna salad, and seafood salad.  The items listed above may not be a complete list of foods and beverages to avoid. Contact your dietitian for more information.  Where to find more information  To calculate the number of calories you need based on your height, weight, and activity level, you can use an online calculator such as:  · www.choosemyplate.gov/MyPlatePlan  To calculate how much weight you should gain during pregnancy, you can use an online pregnancy weight gain calculator such  as:  · www.choosemyplate.gov/pregnancy-weight-gain-calculator  Summary  · While you are pregnant, your body requires additional nutrition to help support your growing baby.  · Eat a variety of foods, especially fruits and vegetables to get a full range of vitamins and minerals.  · Practice good food safety and cleanliness. Wash your hands before you eat and after you prepare raw meat. Wash all fruits and vegetables well before peeling or eating. Taking these actions can help to prevent food-borne illnesses, such as listeriosis, that can be very dangerous to your baby.  · Do not eat raw meat or fish. Do not eat fish that have high mercury content, such as tilefish, shark, swordfish, and megha mackerel. Do not eat unpasteurized (raw) dairy.  · Take a prenatal vitamin to help meet your additional vitamin and mineral needs during pregnancy, specifically for folic acid, iron, calcium, and vitamin D.  This information is not intended to replace advice given to you by your health care provider. Make sure you discuss any questions you have with your health care provider.  Document Released: 10/02/2015 Document Revised: 09/14/2018 Document Reviewed: 09/14/2018  "[employer] Interactive Patient Education © 2019 "[employer] Inc.    Exercise During Pregnancy  For people of all ages, exercise is an important part of being healthy. Exercise improves heart and lung function and helps to maintain strength, flexibility, and a healthy body weight. Exercise also boosts energy levels and elevates mood.  For most women, maintaining an exercise routine throughout pregnancy is recommended. It is only on rare occasions and with certain medical conditions or pregnancy complications that women may be asked to limit or avoid exercise during pregnancy.  What are some other benefits to exercising during pregnancy?  Along with maintaining strength and flexibility, exercising throughout pregnancy can help to:  · Keep strength in muscles that are very  important during labor and childbirth.  · Decrease low back pain during pregnancy.  · Decrease the risk of developing gestational diabetes mellitus (GDM).  · Improve blood sugar (glucose) control for women who have GDM.  · Decrease the risk of developing preeclampsia. This is a serious condition that causes high blood pressure along with other symptoms, such as swelling and headaches.  · Decrease the risk of  delivery.  · Speed up the recovery after giving birth.  How often should I exercise?  Unless your health care provider gives you different instructions, you should try to exercise on most days or all days of the week. In general, try to exercise with moderate intensity for about 150 minutes per week. This can be spread out across several days, such as exercising for 30 minutes per day on 5 days of each week. You can tell that you are exercising at a moderate intensity if you have a higher heart rate and faster breathing, but you are still able to hold a conversation.  What types of moderate-intensity exercise are recommended during pregnancy?  There are many types of exercise that are safe for you to do during pregnancy. Unless your health care provider gives you different instructions, do a variety of exercises that safely increase your heart and breathing (cardiopulmonary) rates and help you to build and maintain muscle strength (strength training). You should always be able to talk in full sentences while exercising during pregnancy.  Some examples of exercising that is safe to do during pregnancy include:  · Brisk walking or hiking.  · Swimming.  · Water aerobics.  · Riding a stationary bike.  · Strength training.  · Modified yoga or Pilates. Tell your instructor that you are pregnant. Avoid overstretching and avoid lying on your back for long periods of time.  · Running or jogging. Only choose this type of exercise if:  ? You ran or jogged regularly before your pregnancy.  ? You can run or jog and  "still talk in complete sentences.  What types of exercise should I not do during pregnancy?  Depending on your level of fitness and whether you exercised regularly before your pregnancy, you may be advised to limit vigorous-intensity exercise during your pregnancy. You can tell that you are exercising at a vigorous intensity if you are breathing much harder and faster and cannot hold a conversation while exercising.  Some examples of exercising that you should avoid during pregnancy include:  · Contact sports.  · Activities that place you at risk for falling on or being hit in the belly, such as downhill skiing, water skiing, surfing, rock climbing, cycling, gymnastics, and horseback riding.  · Scuba diving.  · Cristino diving.  · Yoga or Pilates in a room that is heated to extreme temperatures (\"hot yoga\" or \"hot Pilates\").  · Jogging or running, unless you ran or jogged regularly before your pregnancy. While jogging or running, you should always be able to talk in full sentences. Do not run or jog so vigorously that you are unable to have a conversation.  · If you are not used to exercising at elevation (more than 6,000 feet above sea level), do not do so during your pregnancy.  When should I avoid exercising during pregnancy?  Certain medical conditions can make it unsafe to exercise during pregnancy, or they may increase your risk of miscarriage or early labor and birth. Some of these conditions include:  · Some types of heart disease.  · Some types of lung disease.  · Placenta previa. This is when the placenta partially or completely covers the opening of the uterus (cervix).  · Frequent bleeding from the vagina during your pregnancy.  · Incompetent cervix. This is when your cervix does not remain as tightly closed during pregnancy as it should.  · Premature labor.  · Ruptured membranes. This is when the protective sac (amniotic sac) opens up and amniotic fluid leaks from your vagina.  · Severely low blood count " (anemia).  · Preeclampsia or pregnancy-caused high blood pressure.  · Carrying more than one baby (multiple gestation) and having an additional risk of early labor.  · Poorly controlled diabetes.  · Being severely underweight or severely overweight.  · Intrauterine growth restriction. This is when your baby's growth and development during pregnancy are slower than expected.  · Other medical conditions. Ask your health care provider if any apply to you.  What else should I know about exercising during pregnancy?  You should take these precautions while exercising during pregnancy:  · Avoid overheating.  ? Wear loose-fitting, breathable clothes.  ? Do not exercise in very high temperatures.  · Avoid dehydration. Drink enough water before, during, and after exercise to keep your urine clear or pale yellow.  · Avoid overstretching. Because of hormone changes during pregnancy, it is easy to overstretch muscles, tendons, and ligaments during pregnancy.  · Start slowly and ask your health care provider to recommend types of exercise that are safe for you, if exercising regularly is new for you.  Pregnancy is not a time for exercising to lose weight.  When should I seek medical care?  You should stop exercising and call your health care provider if you have any unusual symptoms, such as:  · Mild uterine contractions or abdominal cramping.  · Dizziness that does not improve with rest.  When should I seek immediate medical care?  You should stop exercising and call your local emergency services (911 in the [place] if you have any unusual symptoms, such as:  · Sudden, severe pain in your low back or your belly.  · Uterine contractions or abdominal cramping that do not improve with rest.  · Chest pain.  · Bleeding or fluid leaking from your vagina.  · Shortness of breath.  This information is not intended to replace advice given to you by your health care provider. Make sure you discuss any questions you have with your health  care provider.  Document Released: 12/18/2006 Document Revised: 05/17/2017 Document Reviewed: 02/25/2016  [employer] Interactive Patient Education © 2019 [employer]      Dental Work and Pregnancy  Proper dental care before, during, and after pregnancy is important for you and your baby. Pregnancy hormones can sometimes cause the gums to swell, which makes it easier for food to become trapped between teeth. The health of your teeth and gums can affect your growing baby.  Dental care recommendations  To help prevent infection and maintain healthy teeth and gums, a thorough oral examination is recommended for all women during the first trimester of pregnancy. Routine cleanings and examinations are recommended throughout pregnancy.  Dental care considerations  · Tell your dentist if you are pregnant or you plan to become pregnant.  · If you are pregnant, avoid routine X-ray exams until after your baby is born. If you are trying to become pregnant, you do not need to avoid X-rays.  ? If you need an emergency procedure that includes a dental X-ray exam during pregnancy, very low levels of radiation will be used, and lead aprons can be used to protect you from radiation.  · Your dentist will discuss the risks and benefits of having dental procedures during pregnancy. If possible, it is best to have dental procedures (such as cavity fillings and crown repair) during the second trimester of pregnancy or after your baby is born.  · If you and your dentist decide to postpone a procedure for any reason, your dentist can recommend treatment to lower the chances of infection until the procedure is performed. This may involve taking certain medicines that are safe to take during pregnancy, such as penicillin or amoxicillin.  Follow these instructions at home:    · Practice good oral hygiene habits at home:  ? Brush your teeth twice a day with fluoride toothpaste. Brush thoroughly for at least 2 minutes. If you have morning  sickness, avoid strongly-flavored toothpastes.  ? Floss at least once a day.  · Visit your dentist to have regular oral exams and cleanings, and if you experience oral problems.  · Eat a well-balanced diet that is low in sugar and carbohydrates.  · If you vomit, rinse your mouth with water afterward.  · Keep all follow-up visits as told by your dentist. This is important.  Seek dental care if:  · You develop any of the following oral symptoms or they get worse:  ? Pain.  ? Bleeding.  ? Swelling.  ? Inflammation.  · You develop growths or swelling between teeth.  Get help right away if:  · You have a fever or chills.  Summary  · Proper dental care before, during, and after pregnancy is important for you and your baby.  · If you are pregnant, routine X-ray exams should be avoided until after your baby is born.  · Your dentist will help you consider the risks and benefits of dental procedures during pregnancy.  · You should brush your teeth with fluoride toothpaste twice a day and floss at least once a day.  This information is not intended to replace advice given to you by your health care provider. Make sure you discuss any questions you have with your health care provider.  Document Released: 06/07/2011 Document Revised: 12/02/2017 Document Reviewed: 12/02/2017  [employer] Interactive Patient Education © 2019 [employer]    Pregnancy and Travel      Most pregnant women can safely travel until the last month of their pregnancy. Your doctor may recommend limiting or avoiding travel depending on how far you are in the pregnancy, and if you have any medical or pregnancy problems.  General travel tips  Before you go:  · Discuss your trip with your doctor. Get examined shortly before you go.  · Get a copy of your medical records. Take it with you.  · Try to get names of doctors and hospitals in the area where you will be visiting.  · Pack your pillow.  · Pack any approved medicines and supplements.  · Get enough sleep the  night before the trip.  During your trip:  · Ask for locations of doctors and hospitals.  · Wear flat, comfortable shoes.  · Wear loose-fitting, comfortable clothes.  · Wear compression stockings as told by your doctor. They may prevent blood clots that arise from sitting for a long time.  · Do leg exercises as told by your doctor.  · Eat a balanced diet, drink lots of fluid, and take your vitamins and supplements.  · Take water, crackers, and fruit with you.  · Take breaks to use the restroom and walk every 2 hours or during stops.  · Do not wear yourself out.  · Do not ride on a motorcycle.  · Rest. If your trip is long, lie down for 30 or more minutes with your feet slightly raised after you reach your destination.  · Always wear a seat belt.  Tips for traveling to a foreign country  Before you go:  · Ask your doctor if there are medicines that are safe for you to take if you get diarrhea, constipation, nausea, or vomiting.  · Check with your health insurance provider about medical coverage abroad. Purchase travel medical insurance, if needed.  · Make sure you are up to date on vaccines.  During your trip:  · Do not eat uncooked foods.  · Do not eat food from buffets or food that is cold or sitting at room temperature.  · Drink bottled beverages and water. Do not use ice.  · Wash fruits and vegetables with clean water. If possible, peel them before eating.  · Do not drink unpasteurized milk.  · Wear insect repellent if there are mosquitoes or other biting insects. Ask your doctor which repellents are safe.  What do I need to know about traveling by car?  · Wear your seat belt properly. The belt should be buckled below your abdomen, on your hip bones. The shoulder belt should be off to the side of your abdomen and across the center of your chest.  · If you are in the front seat, sit as far away from the dashboard as possible to avoid getting hit hard if the airbag deploys in an accident.  · Do not travel for more  than 5-6 hours a day.  What do I need to know about traveling by bus?  · Before making a reservation, ask whether your bus will have a restroom.  · Move your arms and legs when seated.  · If you have to use the restroom, hold on to the seats and handrails as you walk.  · Do not travel for more than 5-6 hours a day.  What do I need to know about traveling by train?  · Before making a reservation, ask if your train will have a sleeping car and more than one restroom.  · If you need to walk while the train is moving, hold on to seats and handrails.  · Move your arms and legs when seated.  · Do not travel for more than 5-6 hours a day.  What do I need to know about traveling by airplane?  · Before booking your trip, ask about the airline's rules about pregnancy. Pregnant women may be restricted from flying after a certain time of the pregnancy. Every airline has its own rules.  · Make sure you complete your trip before 36 weeks of pregnancy.  · Ask whether the airplane cabin will be pressurized. Do not board an unpressurized plane that will fly above 7,000 ft (2,100 m).  · Try to get a bulkhead or an aisle seat so it is easier to get up, stretch, and use the bathroom.  · Wear layers since the cabin temperature can change.  · Put all your medicines and medical records in your carry-on bag.  · Avoid drinking caffeinated or carbonated beverages.  · Avoid eating foods that may make you bloated.  · Do not eat a big meal.  · If you need to walk through the airplane, hold on to the seats and handrails.  · Move your arms and legs when seated.  · Wear your seat belt.  What do I need to know about traveling by [employer] ship?  · Before booking your trip, ask the [employer] company:  ? Are pregnant women allowed on the ship?  ? Is there a medical facility and doctor on board?  ? Does the ship dock in places where there are doctors and medical facilities?  · Before booking your trip, ask your doctor:  ? Is it safe to take medicines  if I get seasick?  ? Is it safe to wear acupressure wristbands to prevent seasickness? If the answer is yes, consider buying one.  Contact a health care provider if:  · You have diarrhea.  · You vomit.  · You have nausea or seasickness.  Get help right away if:  · You have vaginal bleeding.  · You have severe vomiting or diarrhea.  · You have pelvic or abdominal pain.  · You have contractions.  · Your water breaks.  · You have a persistent headache.  · Your eyesight changes or you see spots.  · Your face or hands are swollen.  · You have pain, warmth, or swelling in your legs or ankles.  Summary  · Most pregnant women can safely travel until the last month of their pregnancy. Your doctor may tell you to limit or avoid travel depending on how far you are in your pregnancy and if you have any medical or pregnancy problems.  · The best time to travel is between 14 and 28 weeks of your pregnancy.  · Before you go on your trip, make sure you discuss your trip with your health care provider, get a copy of your medical records, and try to get information on medical centers and doctors at your destination.  · While on your trip, make sure you wear comfortable clothes and shoes, eat a healthy diet, drink plenty of fluids, take your vitamins and supplements, take breaks and rest often, and wear your seat belt.  · Before booking a flight, ask about the airline's rules about pregnancy. Every airline has its own rules. Do the same for a train, bus, or cruise ship.  This information is not intended to replace advice given to you by your health care provider. Make sure you discuss any questions you have with your health care provider.  Document Released: 2009 Document Revised: 2018 Document Reviewed: 2018  [employer] Interactive Patient Education © 2019 [employer]    Pregnancy and Sex  Your sex life may change during pregnancy as well as after your  arrives. It is normal to have questions about sex during  pregnancy. All women are affected differently by pregnancy hormones. You may notice an increase or decrease in your sexual drive throughout your pregnancy. Also, your partner's attitude and sexual drive may change. Share the information in this document with your partner. Talk openly about how you feel about sex.  When is it safe to have sex during pregnancy?  Sex is generally considered safe throughout a normal low-risk pregnancy. Remember:  · The fetus is protected by the uterus and the fluid-filled sac that surrounds the fetus (amniotic sac).  · The cervix is closed or sealed during pregnancy.  · The penis does not reach or harm the fetus during sex.  · Sex and orgasms are not thought to cause miscarriages or early labor.  · If you use lubricants, use a water-soluble product.  What risk factors make it unsafe to have sex while pregnant?  The following complications or risk factors may make it necessary to limit sexual activity:  · You have a history of miscarriage or  labor.  · You have bleeding, discharge, fluid leakage, or contractions.  · Your placenta may be partially covering or completely covering the opening to the cervix (placenta previa).  · Your cervix is weak and opens easily (incompetent cervix).  · Your partner has an STD (sexually transmitted disease). Avoid sex with the infected person or use a condom to prevent infection to the fetus.  · You are unsure of your partner's sexual history. Avoid sex or use condoms.  · You are having twins, triples, or other multiples.  Your health care provider will help you determine whether sex during your pregnancy is safe.  What practices are unsafe?  · If you engage in oral sex, you should avoid having your partner blow air into your vagina. Although very rare, this can send a dangerous air bubble into your bloodstream.  · Anal sex is generally safe during pregnancy, but there can be a risk of spreading bacteria from the rectum and aggravating any  hemorrhoids.  This information is not intended to replace advice given to you by your health care provider. Make sure you discuss any questions you have with your health care provider.  Document Released: 06/07/2011 Document Revised: 08/15/2017 Document Reviewed: 06/08/2017  [employer] Interactive Patient Education © 2019 [employer]      Alpha-Fetoprotein Test  Why am I having this test?  The alpha-fetoprotein test is most commonly used in pregnant women to help screen for birth defects in their unborn baby. It can be used to screen for birth defects, such as chromosome (DNA) abnormalities, problems with the brain or spinal cord, or problems with the abdominal wall of the unborn baby (fetus). The test can be drawn between week 15 and 20 of the pregnancy.     What is being tested?  This test measures the amount of alpha-fetoprotein (AFP) in your blood. AFP is a protein that is made by the liver. Levels can be detected in the mother's blood during pregnancy, starting at 10 weeks and peaking at 16-18 weeks of the pregnancy. Abnormal levels can sometimes be a sign of a birth defect in the baby.  Certain cancers can cause a high level of AFP in men and non-pregnant women.  What kind of sample is taken?    A blood sample is required for this test. It is usually collected by inserting a needle into a blood vessel.  How are the results reported?  Your test results will be reported as values. Your health care provider will compare your results to normal ranges that were established after testing a large group of people (reference values). Reference values may vary among labs and hospitals. For this test, common reference values are:  · Adult: Less than 40 ng/mL or less than 40 mcg/L (SI units).  · Child younger than 1 year: Less than 30 ng/mL.  If you are pregnant, the values may also vary based on how long you have been pregnant.  What do the results mean?  Results that are above the reference values in pregnant women may  indicate the following for the baby:  · Neural tube defects, such as abnormalities of the spinal cord or brain.  · Abdominal wall defects.  · Multiple pregnancy such as twins.  · Fetal distress or fetal death.  Very low levels of AFP in pregnant women may indicate the following for the baby:  · Down syndrome.  · Fetal death.  Talk with your health care provider about what your results mean.  Questions to ask your health care provider  Ask your health care provider, or the department that is doing the test:  · When will my results be ready?  · How will I get my results?  · What are my treatment options?  · What other tests do I need?  · What are my next steps?  Summary  · The alpha-fetoprotein test is done on pregnant women to help screen for birth defects in their unborn baby.  · Certain cancers can cause a high level of AFP in men and non-pregnant women.  · For this test, a blood sample is usually collected by inserting a needle into a blood vessel.  · Talk with your health care provider about what your results mean.  This information is not intended to replace advice given to you by your health care provider. Make sure you discuss any questions you have with your health care provider.  Document Released: 01/11/2006 Document Revised: 07/24/2018 Document Reviewed: 07/24/2018  [employer] Interactive Patient Education © 2019 [employer]

## 2021-07-12 NOTE — PROGRESS NOTES
Initial ob visit      Chief Complaint   Patient presents with   • Initial Prenatal Visit       Theresa Nagy is being seen today for her first obstetrical visit.  She is a 36 y.o.    11w3d gestation.     # 1 - Date: None, Sex: None, Weight: None, GA: None, Delivery: Vaginal, Spontaneous, Apgar1: None, Apgar5: None, Living: None, Birth Comments: None    # 2 - Date: None, Sex: None, Weight: None, GA: None, Delivery: Vaginal, Spontaneous, Apgar1: None, Apgar5: None, Living: None, Birth Comments: None    # 3 - Date: None, Sex: None, Weight: None, GA: None, Delivery: , Low Transverse, Apgar1: None, Apgar5: None, Living: None, Birth Comments: None    # 4 - Date: None, Sex: None, Weight: None, GA: None, Delivery: None, Apgar1: None, Apgar5: None, Living: None, Birth Comments: None    # 5 - Date: None, Sex: None, Weight: None, GA: None, Delivery: None, Apgar1: None, Apgar5: None, Living: None, Birth Comments: None      LNMP: 21  Confident with date: Yes  Taking prenatal vitamins: Yes  Planned pregnancy: Yes  Prior obstetric issues, potential pregnancy concerns: H/O SAB. H/O GHTN.   Family history of genetic issues (includes FOB): denies  Prior infections concerning in pregnancy (Rash, fever in last 2 weeks): denies  Varicella Hx: uncertain   Flu vaccine: 2020  COVID Vaccine: Declines   History of STDs: denies   Current medications: PNV and vaginal progesterone  Last pap smear: approx 7 months ago, reports normal   Smoker: No  Drug or alcohol abuse: No  Prior testing for Cystic Fibrosis Carrier or Sickle Cell Trait- uncertain   Physical, emotional or sexual abuse h/o: Denies  Mental health: H/O depression   Prepregnancy BMI: Body mass index is 40.05 kg/m².      Past Medical History:   Diagnosis Date   • Anxiety    • Depression        Past Surgical History:   Procedure Laterality Date   •  SECTION     • CHOLECYSTECTOMY     • TUBAL ABDOMINAL LIGATION      and reversal         Current Outpatient  Medications:   •  ondansetron (ZOFRAN) 4 MG tablet, 4 mg., Disp: , Rfl:   •  Progesterone (Prometrium) 200 MG capsule, Insert 1 capsule into the vagina Daily., Disp: 90 capsule, Rfl: 3    No Known Allergies    Social History     Socioeconomic History   • Marital status: Single     Spouse name: Not on file   • Number of children: Not on file   • Years of education: Not on file   • Highest education level: Not on file   Tobacco Use   • Smoking status: Never Smoker   • Smokeless tobacco: Never Used   Vaping Use   • Vaping Use: Never used   Substance and Sexual Activity   • Alcohol use: Never   • Drug use: Never   • Sexual activity: Never       No family history on file.    Review of systems     All other systems reviewed and are negative except for: Constitutional: positive for fatigue  Behavioral/Psych: positive for crying easily      Objective    /78   Wt 99.3 kg (219 lb)   LMP 04/23/2021 (Exact Date)   BMI 40.05 kg/m²       General Appearance:    Alert, cooperative, in no acute distress, habitus obese   Head:    Not examined   Eyes:           Not examined   Ears:  Not examined       Neck:  No thyroid enlargement or nodules present   Back:     No kyphosis present, no scoliosis present,                       Lungs:     Clear to auscultation,respirations regular, even and                   unlabored    Heart:    Regular rhythm and normal rate, normal S1 and S2, no            murmur, no gallop, no rub, no click   Breast Exam:    No masses, No nipple discharge   Abdomen:     Normal bowel sounds, no masses, no organomegaly, soft        non-tender, non-distended, no guarding, no rebound                 tenderness   Genitalia:    Vulva - No masses, no atrophy, no lesions    Vagina - No discharge, No bleeding    Cervix - No Lesions, closed. Pap collected:No     Uterus - Consistent with 11 weeks.     Adnexa - No masses, non tender       Extremities:   Moves all extremities well, no edema, no cyanosis, no               redness       Skin:   No bleeding, bruising or rash   Lymph nodes:   No palpable adenopathy   Neurologic:   Sensation intact, A&O times 3      Assessment/Plan    1) Pregnancy at 11w3d- US IMP: Single, viable IUP @ 11.3 weeks gestation. EDC 1/28/22. Normal ovaries. Fibroid uterus- Post/fundal 3.2 x 2.4cm. Post 1.5x 1.2cm. Lt post 2.4 x1.3cm. . US findings discussed with patient. EDC established 1/28/22 and confirmed by US and LNMP .     2) OB exam: OB exam completed: Yes. New OB bag provided Yes. Pap collected: No. Requesting pap record from [facility].     3) Labs: OB labs collected: Yes Counseled on genetic screening: Yes, she desires CF, SMA, and Fragile X. Counseled on Quad screen and AFP: No, she is too early for  AFP. Counseled on NIPS: Yes, she desires NIPS.      4) Patient's Body mass index is 40.05 kg/m². indicating that she is obese (BMI >30). Obesity-related health conditions include the following: GDM and GHTN. Obesity is unchanged. BMI is is above average; BMI management plan is completed. We discussed portion control and increasing exercise..    5)  Prenatal care: Oriented to the office and prenatal care. Encourage prenatal vitamins. Disc Tylenol products are fine, avoid aspirin and ibuprofen; Zika (travel restrictions/ok to use insect repellant); not to change cat litter; food restrictions; exercise;  avoidance of alcohol, tobacco, drugs and saunas/hot tubs.     6) Fibroid uterus: Disc US findings with patient. Will watch fetal growth.     7) COVID19 precautions were reviewed with the patient. Continue to encourage social distancing, wearing a mask, and good hand hygiene. 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 I wore a mask and gloves during this patient encounter.  Patient also wearing a surgical mask.  Hand hygeine performed before and after seeing the patient.  Declines COVID vaccine info.     8) H/O GHTN: Check baseline CMP and 24 hr urine. Rec baby ASA daily.     9) H/O SAB- Using vaginal progesterone. Rec  stopping at 13 weeks     10) H/O Depression:  No meds currently. Reports crying daily and insignificant things. Rec keeping diary of symptoms. Disc medication and or counseling.     11) H/O : With 3rd delivery, has 2 previous .  Reports d/t elevated BP. Pt desires RLTCS @ 39 wks.     12) AMA- The patient has been counseled regarding advanced maternal age in pregnancy. Disc that increased maternal age in pregnancy increases the risk for pregnancy complications such as gestational hypertension or gestational diabetes. Disc that pregnancy after the age of 35 also increases the risk for having a baby with a missing, damaged or extra chromosomes. Also disc that the risk of  labor,  birth and still birth are also higher. A consult with maternal fetal medicine has been offered. Information has been provided on optional screening tests including the Quad screen and cfDNA. The importance of regular prenatal care has been discussed. Desires NIPS. Check TSH.       All questions answered.     RTO 4 weeks     Sarita Givens, THAIS  2021  10:08 EDT

## 2021-07-13 LAB
ABO GROUP BLD: ABNORMAL
BASOPHILS # BLD AUTO: 0.1 X10E3/UL (ref 0–0.2)
BASOPHILS NFR BLD AUTO: 1 %
BLD GP AB SCN SERPL QL: NEGATIVE
EOSINOPHIL # BLD AUTO: 0.1 X10E3/UL (ref 0–0.4)
EOSINOPHIL NFR BLD AUTO: 1 %
ERYTHROCYTE [DISTWIDTH] IN BLOOD BY AUTOMATED COUNT: 13.5 % (ref 11.7–15.4)
HBA1C MFR BLD: 5.1 % (ref 4.8–5.6)
HBV SURFACE AG SERPL QL IA: NEGATIVE
HCT VFR BLD AUTO: 41.7 % (ref 34–46.6)
HCV AB S/CO SERPL IA: <0.1 S/CO RATIO (ref 0–0.9)
HGB A MFR BLD ELPH: 97.2 % (ref 96.4–98.8)
HGB A2 MFR BLD ELPH: 2.8 % (ref 1.8–3.2)
HGB BLD-MCNC: 13.1 G/DL (ref 11.1–15.9)
HGB F MFR BLD ELPH: 0 % (ref 0–2)
HGB FRACT BLD-IMP: NORMAL
HGB S MFR BLD ELPH: 0 %
HIV 1+2 AB+HIV1 P24 AG SERPL QL IA: NON REACTIVE
IMM GRANULOCYTES # BLD AUTO: 0 X10E3/UL (ref 0–0.1)
IMM GRANULOCYTES NFR BLD AUTO: 0 %
LYMPHOCYTES # BLD AUTO: 1.6 X10E3/UL (ref 0.7–3.1)
LYMPHOCYTES NFR BLD AUTO: 21 %
MCH RBC QN AUTO: 26.9 PG (ref 26.6–33)
MCHC RBC AUTO-ENTMCNC: 31.4 G/DL (ref 31.5–35.7)
MCV RBC AUTO: 86 FL (ref 79–97)
MONOCYTES # BLD AUTO: 0.5 X10E3/UL (ref 0.1–0.9)
MONOCYTES NFR BLD AUTO: 7 %
NEUTROPHILS # BLD AUTO: 5.1 X10E3/UL (ref 1.4–7)
NEUTROPHILS NFR BLD AUTO: 70 %
PLATELET # BLD AUTO: 295 X10E3/UL (ref 150–450)
RBC # BLD AUTO: 4.87 X10E6/UL (ref 3.77–5.28)
RH BLD: POSITIVE
RPR SER QL: NON REACTIVE
RUBV IGG SERPL IA-ACNC: 16.5 INDEX
VZV IGG SER IA-ACNC: 1224 INDEX
WBC # BLD AUTO: 7.3 X10E3/UL (ref 3.4–10.8)

## 2021-07-14 LAB
AMPHETAMINES UR QL SCN: NEGATIVE NG/ML
BACTERIA UR CULT: NO GROWTH
BACTERIA UR CULT: NORMAL
BARBITURATES UR QL SCN: NEGATIVE NG/ML
BENZODIAZ UR QL SCN: NEGATIVE NG/ML
BZE UR QL SCN: NEGATIVE NG/ML
CANNABINOIDS UR QL SCN: NEGATIVE NG/ML
CREAT UR-MCNC: 79.7 MG/DL (ref 20–300)
LABORATORY COMMENT REPORT: NORMAL
METHADONE UR QL SCN: NEGATIVE NG/ML
OPIATES UR QL SCN: NEGATIVE NG/ML
OXYCODONE+OXYMORPHONE UR QL SCN: NEGATIVE NG/ML
PCP UR QL: NEGATIVE NG/ML
PH UR: 6.5 [PH] (ref 4.5–8.9)
PROPOXYPH UR QL SCN: NEGATIVE NG/ML

## 2021-07-17 LAB

## 2021-07-19 LAB
CYSTIC FIBROSIS MUTATION 97: NORMAL
GENE DIS ANL CARRIER INTERP-IMP: NORMAL

## 2021-07-20 LAB
CLINICAL GENETICS COUNSELING NOTE: ABNORMAL
CLINICAL INFO: ABNORMAL
ETHNIC BACKGROUND STATED: ABNORMAL
FMR1 GENE CGG RPT BLD/T QL: NORMAL
LAB DIRECTOR NAME PROVIDER: ABNORMAL
LABORATORY COMMENT REPORT: ABNORMAL
LABORATORY COMMENT REPORT: NORMAL
REASON FOR REFERRAL (NARRATIVE): ABNORMAL
REF LAB TEST METHOD: ABNORMAL
SMN1 GENE MUT ANL BLD/T: ABNORMAL
SPECIMEN SOURCE: ABNORMAL
TEST PERFORMANCE INFO SPEC: ABNORMAL
TEST PERFORMANCE INFO SPEC: ABNORMAL

## 2021-07-21 PROBLEM — Z14.8 GENETIC CARRIER: Status: ACTIVE | Noted: 2021-07-21

## 2021-08-09 ENCOUNTER — ROUTINE PRENATAL (OUTPATIENT)
Dept: OBSTETRICS AND GYNECOLOGY | Facility: CLINIC | Age: 36
End: 2021-08-09

## 2021-08-09 VITALS — BODY MASS INDEX: 40.96 KG/M2 | WEIGHT: 224 LBS | DIASTOLIC BLOOD PRESSURE: 80 MMHG | SYSTOLIC BLOOD PRESSURE: 132 MMHG

## 2021-08-09 DIAGNOSIS — Z34.92 PRENATAL CARE IN SECOND TRIMESTER: Primary | ICD-10-CM

## 2021-08-09 DIAGNOSIS — Z87.59 HISTORY OF GESTATIONAL HYPERTENSION: ICD-10-CM

## 2021-08-09 DIAGNOSIS — Z36.9 ENCOUNTER FOR ANTENATAL SCREENING, UNSPECIFIED: ICD-10-CM

## 2021-08-09 LAB
GLUCOSE UR STRIP-MCNC: NEGATIVE MG/DL
PROT UR STRIP-MCNC: NEGATIVE MG/DL

## 2021-08-09 PROCEDURE — 99213 OFFICE O/P EST LOW 20 MIN: CPT | Performed by: NURSE PRACTITIONER

## 2021-08-09 NOTE — PROGRESS NOTES
OB follow up     CC:  Here for prenatal follow up    Theresa Nagy is a 36 y.o.  15w3d being seen today for her obstetrical visit.  Patient reports episodes of crying and feeling sad. . Taking +PNV.     Review of Systems  Consitutional: neg fatigue  Cardiovascular: neg edmea  Gastrointestinal: neg n/v  Genitourinary: Neg for cramping, vaginal bleeding, SROM, or dysuria.   Psych: + episodes of crying and feeling sad     /80   Wt 102 kg (224 lb)   LMP 2021 (Exact Date)   BMI 40.96 kg/m²     FHT: present BPM   Uterine Size: size equals dates   Assessment    1) Pregnancy at 15w3d- Undecided AFP today.     2) Fibroid uterus: Disc US findings with patient. Will watch fetal growth.      3) COVID19 precautions were reviewed with the patient. Continue to encourage social distancing, wearing a mask, and good hand hygiene. 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 I wore a mask and gloves during this patient encounter.  Patient also wearing a surgical mask.  Hand hygeine performed before and after seeing the patient.  Declines COVID vaccine info.      4) H/O GHTN: Turned in 24 hr urine. Is not taking ASA daily.      5) H/O SAB- Stopped vaginal progesterone.       6) H/O Depression:  No meds currently. Denies h/o postpartum depression. Reports crying daily at insignificant things. Rec keeping diary of symptoms. Disc medication and or counseling.      7) H/O : With 3rd delivery, has 2 previous .  Reports d/t elevated BP. Pt desires RLTCS @ 39 wks.      8) AMA-  TSH normal. NIPS neg.     9) Pap NIL  (records in media)     10) SMA carrier- Partner being tested today.     11) Headache- Enc adequate hydration. Rec Vit B2 and magnesium supplement. Caffeine helps headache.     Plan    Continue prenatal vitamins   Reviewed this stage of pregnancy  Problem list updated   Follow up in 4 weeks for OB tummy and anatomy US Sarita Givens, APRN  2021  15:21 EDT

## 2021-08-10 LAB
C TRACH RRNA SPEC QL NAA+PROBE: NEGATIVE
N GONORRHOEA RRNA SPEC QL NAA+PROBE: NEGATIVE
T VAGINALIS DNA SPEC QL NAA+PROBE: NEGATIVE

## 2021-08-11 LAB
AFP ADJ MOM SERPL: 0.78
AFP INTERP SERPL-IMP: NORMAL
AFP INTERP SERPL-IMP: NORMAL
AFP SERPL-MCNC: 19.3 NG/ML
AGE AT DELIVERY: 36.8 YR
GA METHOD: NORMAL
GA: 15.4 WEEKS
IDDM PATIENT QL: NO
LABORATORY COMMENT REPORT: NORMAL
MULTIPLE PREGNANCY: NO
NEURAL TUBE DEFECT RISK FETUS: NORMAL %
RESULT: NORMAL

## 2021-08-16 ENCOUNTER — ROUTINE PRENATAL (OUTPATIENT)
Dept: OBSTETRICS AND GYNECOLOGY | Facility: CLINIC | Age: 36
End: 2021-08-16

## 2021-08-16 VITALS — DIASTOLIC BLOOD PRESSURE: 74 MMHG | WEIGHT: 221.6 LBS | BODY MASS INDEX: 40.52 KG/M2 | SYSTOLIC BLOOD PRESSURE: 128 MMHG

## 2021-08-16 DIAGNOSIS — Z3A.16 16 WEEKS GESTATION OF PREGNANCY: Primary | ICD-10-CM

## 2021-08-16 DIAGNOSIS — O20.9 VAGINAL BLEEDING BEFORE 22 WEEKS GESTATION: ICD-10-CM

## 2021-08-16 DIAGNOSIS — O09.529 ANTEPARTUM MULTIGRAVIDA OF ADVANCED MATERNAL AGE: ICD-10-CM

## 2021-08-16 PROBLEM — Z34.92 PRENATAL CARE IN SECOND TRIMESTER: Status: ACTIVE | Noted: 2021-08-16

## 2021-08-16 PROBLEM — Z34.91 INITIAL OBSTETRIC VISIT IN FIRST TRIMESTER: Status: RESOLVED | Noted: 2021-07-12 | Resolved: 2021-08-16

## 2021-08-16 LAB
GLUCOSE UR STRIP-MCNC: NEGATIVE MG/DL
PROT UR STRIP-MCNC: NEGATIVE MG/DL

## 2021-08-16 PROCEDURE — 99214 OFFICE O/P EST MOD 30 MIN: CPT | Performed by: OBSTETRICS & GYNECOLOGY

## 2021-08-16 RX ORDER — PNV NO.95/FERROUS FUM/FOLIC AC 28MG-0.8MG
TABLET ORAL
COMMUNITY
End: 2022-01-12

## 2021-08-16 NOTE — PROGRESS NOTES
OB follow up     Chief Complaint: [facility] FU    Theresa Nagy is a 36 y.o.  16w3d being seen today for her obstetrical visit.  This is the first time I am seeing this pt. Pt presetnts for an emergency appt bc she started bleeding heavily 2 days ago. She states she had bright red bleeding with clots. The bleeding then stopped. Pt is no longer bleeding. Pt presents for US and appt. Pt reports her  was tested for SMA but results have not returned.    Review of Systems  No bleeding, No cramping/contractions     /74   Wt 101 kg (221 lb 9.6 oz)   LMP 2021 (Exact Date)   BMI 40.52 kg/m²     FHT:   present   Uterine Size:           Assessment/Plan: Low risk pregnancy    1) pregnancy at 16w3d: cfDNA low risk. AFP negative     2) VB: resolved: US today reveals IUD with FCA. No evidence of subchorionic hemorrhage. CL: 4.0. Left adnexal complex mass measuring 3.3 x 2.9cm. Repeat US scheduled.  Rh positive.      3) COVID19 precautions were reviewed with the patient. Continue to encourage social distancing, wearing a mask, and good hand hygiene. 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 I wore a mask and gloves during this patient encounter.  Patient also wearing a surgical mask.  Hand hygeine performed before and after seeing the patient.  Declines COVID vaccine info.      4) H/O GHTN: 24hr urine 102mg protein     5) H/O SAB- Stopped vaginal progesterone.       6) H/O Depression:  No meds currently.      7) H/O : With 3rd delivery, has 2 previous .  Reports d/t elevated BP. Pt desires RLTCS @ 39 wks.      8) AMA-  TSH normal. NIPS neg.      9) Pap NIL  (records in media)      10) SMA carrier- Partner has been tested with results pending        Reviewed this stage of pregnancy  Problem list updated   No follow-ups on file.      Malathi Fuentes DO    2021  12:08 EDT

## 2021-08-19 ENCOUNTER — HOSPITAL ENCOUNTER (EMERGENCY)
Facility: HOSPITAL | Age: 36
Discharge: HOME | End: 2021-08-19
Attending: EMERGENCY MEDICINE | Admitting: EMERGENCY MEDICINE

## 2021-08-19 VITALS
HEIGHT: 61 IN | TEMPERATURE: 97.7 F | WEIGHT: 233.2 LBS | OXYGEN SATURATION: 98 % | BODY MASS INDEX: 44.03 KG/M2 | DIASTOLIC BLOOD PRESSURE: 86 MMHG | HEART RATE: 86 BPM | SYSTOLIC BLOOD PRESSURE: 140 MMHG | RESPIRATION RATE: 12 BRPM

## 2021-08-19 DIAGNOSIS — O23.41 URINARY TRACT INFECTION AFFECTING CARE OF MOTHER IN FIRST TRIMESTER, ANTEPARTUM: Primary | ICD-10-CM

## 2021-08-19 LAB
BACTERIA UR QL AUTO: ABNORMAL /HPF
BILIRUB UR QL STRIP: NEGATIVE
CLARITY UR: CLEAR
COLOR UR: YELLOW
GLUCOSE UR STRIP-MCNC: NEGATIVE MG/DL
HGB UR QL STRIP.AUTO: NEGATIVE
HYALINE CASTS UR QL AUTO: ABNORMAL /LPF
KETONES UR QL STRIP: NEGATIVE
LEUKOCYTE ESTERASE UR QL STRIP.AUTO: ABNORMAL
NITRITE UR QL STRIP: NEGATIVE
PH UR STRIP.AUTO: 7.5 [PH] (ref 4.5–8)
PROT UR QL STRIP: NEGATIVE
RBC # UR: ABNORMAL /HPF
REF LAB TEST METHOD: ABNORMAL
SP GR UR STRIP: 1.02 (ref 1–1.03)
SQUAMOUS #/AREA URNS HPF: ABNORMAL /HPF
UROBILINOGEN UR QL STRIP: ABNORMAL
WBC UR QL AUTO: ABNORMAL /HPF

## 2021-08-19 PROCEDURE — 99283 EMERGENCY DEPT VISIT LOW MDM: CPT

## 2021-08-19 PROCEDURE — 81001 URINALYSIS AUTO W/SCOPE: CPT | Performed by: EMERGENCY MEDICINE

## 2021-08-19 PROCEDURE — 99283 EMERGENCY DEPT VISIT LOW MDM: CPT | Performed by: EMERGENCY MEDICINE

## 2021-08-19 RX ORDER — CEFUROXIME AXETIL 500 MG/1
500 TABLET ORAL 2 TIMES DAILY
Qty: 20 TABLET | Refills: 0 | Status: SHIPPED | OUTPATIENT
Start: 2021-08-19 | End: 2021-08-23

## 2021-08-19 RX ORDER — CEFUROXIME AXETIL 250 MG/1
500 TABLET ORAL ONCE
Status: COMPLETED | OUTPATIENT
Start: 2021-08-19 | End: 2021-08-19

## 2021-08-19 RX ADMIN — CEFUROXIME AXETIL 500 MG: 250 TABLET, FILM COATED ORAL at 22:26

## 2021-08-20 NOTE — ED PROVIDER NOTES
Subjective     History provided by:  Patient    History of Present Illness    · Chief complaint: Abdominal pain    · Location: Suprapubic and lower abdomen radiating around both sides to both flanks.    · Quality/Severity: Pain has been constant and gotten progressively worse as the day went on.    · Timing/Onset: Started this morning.    · Modifying Factors: The patient is currently 17 weeks pregnant.    · Associated symptoms: She denies any vaginal bleeding.  She denies dysuria, but reports urinary urgency and frequency.  Denies nausea or vomiting or diarrhea.  Denies a fever.    · Narrative: The patient is a 36-year-old female who is a  5 para 3 AB 1 who is currently 17 weeks pregnant.  She developed lower abdominal pain this morning that radiates around both of her size to both of her flanks that has been constant all day.  She denies dysuria but reports urinary urgency and frequency.  She denies any vaginal bleeding.  Her prenatal course was been complicated by vaginal bleeding.  She denies any vaginal bleeding today.    Review of Systems   Constitutional: Negative for activity change, appetite change, chills, diaphoresis, fatigue and fever.   HENT: Negative for congestion, dental problem, ear pain, hearing loss, mouth sores, postnasal drip, rhinorrhea, sinus pressure, sore throat and voice change.    Eyes: Negative for photophobia, pain, discharge, redness and visual disturbance.   Respiratory: Negative for cough, chest tightness, shortness of breath, wheezing and stridor.    Cardiovascular: Negative for chest pain, palpitations and leg swelling.   Gastrointestinal: Positive for abdominal pain. Negative for diarrhea, nausea and vomiting.   Genitourinary: Positive for flank pain, frequency and urgency. Negative for difficulty urinating, dysuria, hematuria and vaginal bleeding.   Musculoskeletal: Negative for arthralgias, back pain, gait problem, joint swelling, myalgias, neck pain and neck stiffness.  "  Skin: Negative for color change and rash.   Neurological: Negative for dizziness, tremors, seizures, syncope, facial asymmetry, speech difficulty, weakness, light-headedness, numbness and headaches.   Hematological: Negative for adenopathy.   Psychiatric/Behavioral: Negative.  Negative for confusion and decreased concentration. The patient is not nervous/anxious.      Past Medical History:   Diagnosis Date   • Anxiety    • Depression      /86   Pulse 86   Temp 97.7 °F (36.5 °C) (Oral)   Resp 12   Ht 154.9 cm (61\")   Wt 106 kg (233 lb 3.2 oz)   LMP 2021 (Exact Date)   SpO2 98%   BMI 44.06 kg/m²     Past Medical History:   Diagnosis Date   • Anxiety    • Depression        No Known Allergies    Past Surgical History:   Procedure Laterality Date   •  SECTION     • CHOLECYSTECTOMY     • TUBAL ABDOMINAL LIGATION      and reversal       History reviewed. No pertinent family history.    Social History     Socioeconomic History   • Marital status: Single     Spouse name: Not on file   • Number of children: Not on file   • Years of education: Not on file   • Highest education level: Not on file   Tobacco Use   • Smoking status: Never Smoker   • Smokeless tobacco: Never Used   Vaping Use   • Vaping Use: Never used   Substance and Sexual Activity   • Alcohol use: Never   • Drug use: Never   • Sexual activity: Never           Objective   Physical Exam  Vitals and nursing note reviewed.   Constitutional:       General: She is not in acute distress.     Appearance: She is well-developed. She is not diaphoretic.   HENT:      Head: Normocephalic and atraumatic.      Nose: Nose normal.      Mouth/Throat:      Mouth: Mucous membranes are moist.      Pharynx: Oropharynx is clear. No pharyngeal swelling or oropharyngeal exudate.   Eyes:      General: No scleral icterus.        Right eye: No discharge.         Left eye: No discharge.      Extraocular Movements: Extraocular movements intact.      Pupils: " Pupils are equal, round, and reactive to light.   Neck:      Thyroid: No thyromegaly.      Vascular: No JVD.   Cardiovascular:      Rate and Rhythm: Normal rate and regular rhythm.      Heart sounds: Normal heart sounds. No murmur heard.     Pulmonary:      Effort: Pulmonary effort is normal.      Breath sounds: Normal breath sounds. No wheezing, rhonchi or rales.   Chest:      Chest wall: No tenderness.   Abdominal:      General: Bowel sounds are normal. There is no distension.      Palpations: Abdomen is soft.      Tenderness: There is no abdominal tenderness. There is no right CVA tenderness, left CVA tenderness, guarding or rebound. Negative signs include Camacho's sign and McBurney's sign.   Musculoskeletal:         General: No tenderness or deformity. Normal range of motion.      Cervical back: Normal range of motion and neck supple.   Lymphadenopathy:      Cervical: No cervical adenopathy.   Skin:     General: Skin is warm and dry.      Capillary Refill: Capillary refill takes less than 2 seconds.      Findings: No rash.   Neurological:      Mental Status: She is alert and oriented to person, place, and time.      Cranial Nerves: No cranial nerve deficit.      Coordination: Coordination normal.      Comments: No focal motor sensory deficit   Psychiatric:         Behavior: Behavior normal.         Thought Content: Thought content normal.         Judgment: Judgment normal.         Procedures           ED Course  ED Course as of Aug 19 2307   Thu Aug 19, 2021   2304 Fetal heart tones were normal 146.    [TP]   2304 The patient's urinalysis was positive for leukocyte esterases and had 3-5 WBCs with 2+ bacteria consistent with a UTI.    [TP]   2304 The patient suprapubic abdominal pain rating to both flanks together with a urinalysis that shows infection supports the diagnosis of a UTI.  She is not having any vaginal bleeding.  Antibiotic therapy for UTI was initiated with Ceftin 500 mg and she'll be discharged  with a 10-day course of the same.  She is to follow-up with her obstetrician next Monday.    [TP]      ED Course User Index  [TP] Malick Rosado MD                                           MDM  Number of Diagnoses or Management Options  Urinary tract infection affecting care of mother in first trimester, antepartum: new and requires workup     Amount and/or Complexity of Data Reviewed  Clinical lab tests: ordered and reviewed    Risk of Complications, Morbidity, and/or Mortality  Presenting problems: moderate  Diagnostic procedures: moderate  Management options: moderate  General comments: My differential diagnosis for abdominal pain includes but is not limited to:  Gastritis, gastroenteritis, peptic ulcer disease, GERD, esophageal perforation, acute appendicitis, mesenteric adenitis, Meckel’s diverticulum, epiploic appendagitis, diverticulitis, colon cancer, ulcerative colitis, Crohn’s disease, intussusception, small bowel obstruction, adhesions, ischemic bowel, perforated viscus, ileus, obstipation, biliary colic, cholecystitis, cholelithiasis, Conner-Edgar Jovon, hepatitis, pancreatitis, common bile duct obstruction, cholangitis, bile leak, splenic trauma, splenic rupture, splenic infarction, splenic abscess, abdominal abscess, ascites, spontaneous bacterial peritonitis, hernia, UTI, cystitis, prostatitis, ureterolithiasis, urinary obstruction, AAA, myocardial infarction, pneumonia, cancer, porphyria, DKA, medications, sickle cell, viral syndrome, zoster    Patient Progress  Patient progress: stable      Final diagnoses:   Urinary tract infection affecting care of mother in first trimester, antepartum       ED Disposition  ED Disposition     ED Disposition Condition Comment    Discharge Stable           Paris Fitzgerald MD  [address] [facility] [address]  [facility] [address]  [phone]    In 4 days           Medication List      New Prescriptions    cefuroxime 500 MG tablet  Commonly known as:  CEFTIN  Take 1 tablet by mouth 2 (Two) Times a Day for 10 days.           Where to Get Your Medications      These medications were sent to [address] [phone] [facility] [phone] [place] [id number]    Phone: [phone]   · cefuroxime 500 MG tablet         Labs Reviewed   URINALYSIS W/ MICROSCOPIC IF INDICATED (NO CULTURE) - Abnormal; Notable for the following components:       Result Value    Leuk Esterase, UA Trace (*)     All other components within normal limits   URINALYSIS, MICROSCOPIC ONLY - Abnormal; Notable for the following components:    WBC, UA 3-5 (*)     Bacteria, UA 2+ (*)     Squamous Epithelial Cells, UA 3-6 (*)     All other components within normal limits     No orders to display          Medication List      New Prescriptions    cefuroxime 500 MG tablet  Commonly known as: CEFTIN  Take 1 tablet by mouth 2 (Two) Times a Day for 10 days.           Where to Get Your Medications      These medications were sent to [address] [phone] [place] [phone] [place] [id number]    Phone: [phone]   · cefuroxime 500 MG tablet              Malick Rosado MD  08/19/21 7866

## 2021-08-23 ENCOUNTER — ROUTINE PRENATAL (OUTPATIENT)
Dept: OBSTETRICS AND GYNECOLOGY | Facility: CLINIC | Age: 36
End: 2021-08-23

## 2021-08-23 VITALS — BODY MASS INDEX: 41.76 KG/M2 | SYSTOLIC BLOOD PRESSURE: 122 MMHG | WEIGHT: 221 LBS | DIASTOLIC BLOOD PRESSURE: 72 MMHG

## 2021-08-23 DIAGNOSIS — Z14.8 GENETIC CARRIER: ICD-10-CM

## 2021-08-23 DIAGNOSIS — D21.9 FIBROIDS: ICD-10-CM

## 2021-08-23 DIAGNOSIS — Z34.92 PRENATAL CARE IN SECOND TRIMESTER: ICD-10-CM

## 2021-08-23 DIAGNOSIS — O20.0 THREATENED ABORTION: ICD-10-CM

## 2021-08-23 DIAGNOSIS — O23.42 URINARY TRACT INFECTION IN MOTHER DURING SECOND TRIMESTER OF PREGNANCY: ICD-10-CM

## 2021-08-23 DIAGNOSIS — N94.89 ADNEXAL MASS: ICD-10-CM

## 2021-08-23 DIAGNOSIS — Z87.59 HISTORY OF GESTATIONAL HYPERTENSION: ICD-10-CM

## 2021-08-23 DIAGNOSIS — N89.8 VAGINAL DISCHARGE: ICD-10-CM

## 2021-08-23 DIAGNOSIS — O09.529 ANTEPARTUM MULTIGRAVIDA OF ADVANCED MATERNAL AGE: ICD-10-CM

## 2021-08-23 DIAGNOSIS — Z3A.16 16 WEEKS GESTATION OF PREGNANCY: Primary | ICD-10-CM

## 2021-08-23 DIAGNOSIS — N88.8 CERVICAL FUNNELING: ICD-10-CM

## 2021-08-23 LAB
GLUCOSE UR STRIP-MCNC: NEGATIVE MG/DL
PROT UR STRIP-MCNC: NEGATIVE MG/DL

## 2021-08-23 PROCEDURE — 99214 OFFICE O/P EST MOD 30 MIN: CPT | Performed by: OBSTETRICS & GYNECOLOGY

## 2021-08-23 NOTE — PROGRESS NOTES
OB follow up      Theresa Nagy is a 36 y.o.  17w3d being seen today for ER f/u. She was seen in the ER on 8/15/2021 for cramping and spotting. She was treated for a UTI with Ceftin and sent home .  Patient reports her cramping has improved but she is still having brown spotting.. Fetal movement: not yet..This is the first time I am seeing this patient in this pregnancy and all of her problems are new to me, the examiner.       Review of Systems  No bleeding, No cramping/contractions     /72   Wt 100 kg (221 lb)   LMP 2021 (Exact Date)   BMI 41.76 kg/m²     FHT: 142 BPM   Uterine Size: 18  cm       Assessment/Plan:    1) 36 y.o.  -pregnancy at 17w3d with VB: no frnak VB, just spotting US today reveals IUP with . CVL 3 cm, with funneling seen measuring 1.5 x 0.3 cm. She has a fundal  fibroid that measures 2.9 x 2.7 cm and a posterior cervical fibroid that measures 2.2 x 2.1 cm. Placenta is posterior. There is a L complex adnexal mass that measures 5.2 x 4.6 cm. This US was compared to her scan on 2021. Repeat US scheduled in 1 week. Cont limited activities and pelvic rest.  Rh positive.      3) COVID19 precautions were reviewed with the patient. Continue to encourage social distancing, wearing a mask, and good hand hygiene. 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 I wore a mask and gloves during this patient encounter.  Patient also wearing a surgical mask.  Hand hygeine performed before and after seeing the patient.  Declines COVID vaccine info.      4) H/O GHTN: 24hr urine 102mg protein, hold ASA 81 mg.      5) H/O SAB- Stopped vaginal progesterone.       6) H/O tubal reversal      7) H/O : With 3rd delivery, has 2 previous .  Reports d/t elevated BP. Pt desires RLTCS @ 39 wks. Get old chart.     8) AMA-  TSH normal. NIPS neg, AFP low risk.      9) Pap NIL  (records in media)      10) SMA carrier- Partner has been tested and he is negative.     11) UTI x 1- pt still  on Ceftin, repeat UCx in 1 month.     12)Vaginal discharge- . Check Nuwab B/M/L.     13)Reviewed this stage of pregnancy    14)Problem list updated     15) RTO 1 week US CL and OBT.      Paris Fitzgerald MD    8/23/2021  13:40 EDT

## 2021-08-28 LAB
A VAGINAE DNA VAG QL NAA+PROBE: NORMAL SCORE
BVAB2 DNA VAG QL NAA+PROBE: NORMAL SCORE
C ALBICANS DNA VAG QL NAA+PROBE: NEGATIVE
C GLABRATA DNA VAG QL NAA+PROBE: NEGATIVE
C TRACH DNA VAG QL NAA+PROBE: NEGATIVE
M GENITALIUM DNA SPEC QL NAA+PROBE: NEGATIVE
M HOMINIS DNA SPEC QL NAA+PROBE: NEGATIVE
MEGA1 DNA VAG QL NAA+PROBE: NORMAL SCORE
N GONORRHOEA DNA VAG QL NAA+PROBE: NEGATIVE
T VAGINALIS DNA VAG QL NAA+PROBE: NEGATIVE
UREAPLASMA DNA SPEC QL NAA+PROBE: POSITIVE

## 2021-08-29 PROBLEM — O23.42 URINARY TRACT INFECTION IN MOTHER DURING SECOND TRIMESTER OF PREGNANCY: Status: ACTIVE | Noted: 2021-08-29

## 2021-08-29 PROBLEM — N88.8 CERVICAL FUNNELING: Status: ACTIVE | Noted: 2021-08-29

## 2021-08-29 PROBLEM — D21.9 FIBROIDS: Status: ACTIVE | Noted: 2021-08-29

## 2021-08-29 PROBLEM — N94.89 ADNEXAL MASS: Status: ACTIVE | Noted: 2021-08-29

## 2021-08-29 PROBLEM — N89.8 VAGINAL DISCHARGE: Status: ACTIVE | Noted: 2021-08-29

## 2021-08-30 ENCOUNTER — ROUTINE PRENATAL (OUTPATIENT)
Dept: OBSTETRICS AND GYNECOLOGY | Facility: CLINIC | Age: 36
End: 2021-08-30

## 2021-08-30 VITALS — WEIGHT: 224 LBS | BODY MASS INDEX: 42.32 KG/M2 | SYSTOLIC BLOOD PRESSURE: 132 MMHG | DIASTOLIC BLOOD PRESSURE: 82 MMHG

## 2021-08-30 DIAGNOSIS — O46.92 VAGINAL BLEEDING IN PREGNANCY, SECOND TRIMESTER: ICD-10-CM

## 2021-08-30 DIAGNOSIS — O09.529 ANTEPARTUM MULTIGRAVIDA OF ADVANCED MATERNAL AGE: ICD-10-CM

## 2021-08-30 DIAGNOSIS — Z34.92 PRENATAL CARE IN SECOND TRIMESTER: Primary | ICD-10-CM

## 2021-08-30 LAB
GLUCOSE UR STRIP-MCNC: NEGATIVE MG/DL
PROT UR STRIP-MCNC: NEGATIVE MG/DL

## 2021-08-30 PROCEDURE — 99213 OFFICE O/P EST LOW 20 MIN: CPT | Performed by: NURSE PRACTITIONER

## 2021-08-30 RX ORDER — AZITHROMYCIN 250 MG/1
TABLET, FILM COATED ORAL
Qty: 6 TABLET | Refills: 0 | Status: SHIPPED | OUTPATIENT
Start: 2021-08-30 | End: 2021-10-19

## 2021-08-30 NOTE — PROGRESS NOTES
OB follow up      Theresa Nagy is a 36 y.o.  18w3d being seen today as a follow up for bleeding in the 2nd trimester.  She was seen in the ER on 8/15/2021 for cramping and spotting. She was treated for a UTI with Ceftin and sent home .  Patient reports she also had spotting and cramping at that time.  She was seen last week by Dr. Fitzgerald and placed on pelvic rest and light duty. She has not had any spotting or bleeding since last Monday. Fetal movement: feeling flutters  Review of Systems  No bleeding, No cramping/contractions     /82   Wt 102 kg (224 lb)   LMP 2021 (Exact Date)   BMI 42.32 kg/m²     FHT: 159  BPM   Uterine Size: S=D        Assessment/Plan:    1) 36 y.o.  -pregnancy at 18w3d with VB: US IMP: CL 3.3cm. Small funnel noted to 0.7 x0.7cm. . Post cervical fibroid 2.2 x 2.2cm. Post fibroid 4.0 x 2.8cm. Normal adnexa bilateral.     2) Vaginal bleeding- Rh +. Has resolved.  CL 3.3cm with small funnel noted 0.7x 0.7cm  today compared to US last week reveals IUP with . CVL 3 cm, with funneling seen measuring 1.5 x 0.3 cm. Continue pelvic rest and light duty at home.     3) COVID19 precautions were reviewed with the patient. Continue to encourage social distancing, wearing a mask, and good hand hygiene. 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 I wore a mask and gloves during this patient encounter.  Patient also wearing a surgical mask.  Hand hygeine performed before and after seeing the patient.  Declines COVID vaccine info.      4) H/O GHTN: 24hr urine 102mg protein, hold ASA 81 mg.      5) H/O SAB- Stopped vaginal progesterone.       6) H/O tubal reversal      7) H/O : With 3rd delivery, has 2 previous .  Reports d/t elevated BP. Pt desires RLTCS @ 39 wks. Get old chart.     8) AMA-  TSH normal. NIPS neg, AFP low risk.      9) Pap NIL  (records in media)      10) SMA carrier- Partner has been tested and he is negative.     11) UTI x 1- Completed  Ceftin, repeat UCx in 3 weeks.     12)Vaginal discharge- NuSwab + for ureaplasma. Has not picked up Z abisai from the pharmacy yet. Disc importance of completing antibx.     13) Adnexal mass- L complex adnexal mass that measures 5.2 x 4.6 cm seen on US last week, not visualized on US today.     14)Problem list updated     RTO 1 week for CL and anatomy US (keep scheduled appt)     Sarita Givens, THAIS  8/30/2021  15:51 EDT

## 2021-09-14 ENCOUNTER — ROUTINE PRENATAL (OUTPATIENT)
Dept: OBSTETRICS AND GYNECOLOGY | Facility: CLINIC | Age: 36
End: 2021-09-14

## 2021-09-14 VITALS — SYSTOLIC BLOOD PRESSURE: 140 MMHG | BODY MASS INDEX: 42.7 KG/M2 | WEIGHT: 226 LBS | DIASTOLIC BLOOD PRESSURE: 86 MMHG

## 2021-09-14 DIAGNOSIS — O09.529 ANTEPARTUM MULTIGRAVIDA OF ADVANCED MATERNAL AGE: Primary | ICD-10-CM

## 2021-09-14 DIAGNOSIS — Z14.8 GENETIC CARRIER: ICD-10-CM

## 2021-09-14 PROCEDURE — 99212 OFFICE O/P EST SF 10 MIN: CPT | Performed by: OBSTETRICS & GYNECOLOGY

## 2021-09-14 NOTE — PROGRESS NOTES
OB follow up     Theresa Nagy is a 36 y.o.  20w4d being seen today for her obstetrical visit.  Patient reports no bleeding, no contractions and no leaking. Fetal movement: normal.  Patient here for anatomy scan.  She had second trimester vaginal bleeding and she also has a cervical length ordered.  She has advanced maternal age and her noninvasive prenatal testing was normal.  She is an SMA carrier but her partner tested negative.    Review of Systems  No bleeding, No cramping/contractions     /86   Wt 103 kg (226 lb)   LMP 2021 (Exact Date)   BMI 42.70 kg/m²     FHT: present BPM   Uterine Size: 20 cm   Ultrasound shows live viable inman fetus in the breech position.  Cervical length is 4.2 cm.  Fetus appears female.  MARIMAR is normal.  Anatomical survey is normal but limited cardiac views due to fetal position and maternal habitus.  Recommend repeat cardiac views in 4 weeks.    Assessment/Plan:    1) 36 y.o.  -pregnancy at 20w4d    2)   Encounter Diagnoses   Name Primary?   • Antepartum multigravida of advanced maternal age- NIPT low risk, AFP neg Yes   • SMA carrier: Partner testing= FOB neg    Cervical length reassuring.  Repeat cardiac views in 4 weeks.    3) Reviewed this stage of pregnancy  4) Problem list updated     Return in about 4 weeks (around 10/12/2021) for US, Anatomy (f/u cardiac views), OB Turner.      Murray Sheikh MD    2021  15:21 EDT

## 2021-10-04 ENCOUNTER — TELEPHONE (OUTPATIENT)
Dept: OBSTETRICS AND GYNECOLOGY | Facility: HOSPITAL | Age: 36
End: 2021-10-04

## 2021-10-04 NOTE — TELEPHONE ENCOUNTER
Called pt to introduce [facility] to pt.  This was the 2nd attempt to contact the pt.  Pt accepted the program.  Intake scheduled 10/7/2021 @ 1000.

## 2021-10-06 ENCOUNTER — DOCUMENTATION (OUTPATIENT)
Dept: OBSTETRICS AND GYNECOLOGY | Facility: HOSPITAL | Age: 36
End: 2021-10-06

## 2021-10-06 ENCOUNTER — TELEPHONE (OUTPATIENT)
Dept: OBSTETRICS AND GYNECOLOGY | Facility: HOSPITAL | Age: 36
End: 2021-10-06

## 2021-10-14 ENCOUNTER — TELEPHONE (OUTPATIENT)
Dept: OBSTETRICS AND GYNECOLOGY | Facility: HOSPITAL | Age: 36
End: 2021-10-14

## 2021-10-14 NOTE — TELEPHONE ENCOUNTER
Spoke to pt this morning about rescheduling her intake appt with [facility].  Pt states she is at her U/S now and will call me back after the appt.

## 2021-10-19 ENCOUNTER — ROUTINE PRENATAL (OUTPATIENT)
Dept: OBSTETRICS AND GYNECOLOGY | Facility: CLINIC | Age: 36
End: 2021-10-19

## 2021-10-19 VITALS — DIASTOLIC BLOOD PRESSURE: 80 MMHG | BODY MASS INDEX: 43.46 KG/M2 | SYSTOLIC BLOOD PRESSURE: 134 MMHG | WEIGHT: 230 LBS

## 2021-10-19 DIAGNOSIS — Z14.8 GENETIC CARRIER: ICD-10-CM

## 2021-10-19 DIAGNOSIS — D21.9 FIBROIDS: ICD-10-CM

## 2021-10-19 DIAGNOSIS — O09.529 ANTEPARTUM MULTIGRAVIDA OF ADVANCED MATERNAL AGE: Primary | ICD-10-CM

## 2021-10-19 DIAGNOSIS — Z98.890 HISTORY OF REVERSAL OF TUBAL LIGATION: ICD-10-CM

## 2021-10-19 LAB
GLUCOSE UR STRIP-MCNC: NEGATIVE MG/DL
PROT UR STRIP-MCNC: NEGATIVE MG/DL

## 2021-10-19 PROCEDURE — 99212 OFFICE O/P EST SF 10 MIN: CPT | Performed by: OBSTETRICS & GYNECOLOGY

## 2021-10-19 NOTE — PROGRESS NOTES
OB follow up     Theresa Nagy is a 36 y.o.  25w4d being seen today for her obstetrical visit.  Patient reports no bleeding, no contractions and no leaking. Fetal movement: normal.  Prenatal care complicated by advanced maternal age.  Noninvasive prenatal testing and AFP were both negative.  She is an SMA carrier but her partner is negative.  She has a history of gestational hypertension but normal blood pressure so far.  She does have known fibroids.    Review of Systems  No bleeding, No cramping/contractions     /80   Wt 104 kg (230 lb)   LMP 2021 (Exact Date)   BMI 43.46 kg/m²     FHT: present BPM   Uterine Size: 25 cm       Assessment/Plan:    1) 36 y.o.  -pregnancy at 25w4d    2)   Encounter Diagnoses   Name Primary?   • Antepartum multigravida of advanced maternal age- NIPT low risk, AFP neg Yes   • Fibroids    • History of reversal of tubal ligation    • SMA carrier: Partner testing= FOB neg    Pregnancy complications stable.  Recommend 2-hour GTT at next visit.  N.p.o. for next test.    3) Reviewed this stage of pregnancy  4) Problem list updated     Return in about 3 weeks (around 2021) for 2 HR GTT, OB Faustomy.      Murray Sheikh MD    10/19/2021  14:44 EDT

## 2021-11-09 ENCOUNTER — TELEPHONE (OUTPATIENT)
Dept: OBSTETRICS AND GYNECOLOGY | Facility: HOSPITAL | Age: 36
End: 2021-11-09

## 2021-11-09 NOTE — TELEPHONE ENCOUNTER
Called pt to let her know that I would like to get her intake rescheduled if she was still interested in the [facility] program.  Otherwise, I could take her off of my list and stop disturbing her.  She agreed that she is good right now.  Let her know that if she changes her mind, that she can call me.

## 2021-12-03 ENCOUNTER — ROUTINE PRENATAL (OUTPATIENT)
Dept: OBSTETRICS AND GYNECOLOGY | Facility: CLINIC | Age: 36
End: 2021-12-03

## 2021-12-03 VITALS — DIASTOLIC BLOOD PRESSURE: 74 MMHG | BODY MASS INDEX: 45.69 KG/M2 | SYSTOLIC BLOOD PRESSURE: 122 MMHG | WEIGHT: 241.8 LBS

## 2021-12-03 DIAGNOSIS — Z36.9 ENCOUNTER FOR ANTENATAL SCREENING, UNSPECIFIED: Primary | ICD-10-CM

## 2021-12-03 DIAGNOSIS — Z34.93 PRENATAL CARE IN THIRD TRIMESTER: ICD-10-CM

## 2021-12-03 DIAGNOSIS — O26.849 FETAL SIZE INCONSISTENT WITH DATES: ICD-10-CM

## 2021-12-03 LAB
GLUCOSE UR STRIP-MCNC: NEGATIVE MG/DL
PROT UR STRIP-MCNC: NEGATIVE MG/DL

## 2021-12-03 PROCEDURE — 99213 OFFICE O/P EST LOW 20 MIN: CPT | Performed by: NURSE PRACTITIONER

## 2021-12-03 NOTE — PROGRESS NOTES
OB follow up      Theresa Nagy is a 36 y.o.  32w0d being seen today for OB follow up. She is doing her 2hr GTT today. She is Rh +.  Reports she is feeling well. Desires to do the flu vaccine at postpartum.       Review of Systems  No bleeding, No cramping/contractions     /74   Wt 110 kg (241 lb 12.8 oz)   LMP 2021 (Exact Date)   BMI 45.69 kg/m²     FHT: 150s BPM   Uterine Size: 36       Assessment/Plan:    1) 36 y.o.  -pregnancy at 32w0d- 2hr GTT in progress. Rh +.     2) Flu vaccine- Declines vaccine. Encouraged the flu vaccine during pregnancy. Discuss normal physiological changes during pregnancy increase the susceptibility of the flu virus and increase the risk of severe illness for the pregnant woman. Disc flu can be harmful to the unborn baby as well. Enc the flu vaccine. Disc with patient that getting the flu vaccine is the first and most important step in protecting against the flu. Flu vaccines given during pregnancy help protect both the mother and the baby. Getting the flu vaccine during pregnancy also helps protect the  from flu illness for several months after their birth, when then are too young to get vaccinated. Also disc the importance of good hand hygiene and avoiding people who are sick. Call for Tamiflu with exposure or symptoms.     3) COVID19 precautions were reviewed with the patient. Continue to encourage social distancing, wearing a mask, and good hand hygiene. 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 I wore a mask and gloves during this patient encounter.  Patient also wearing a surgical mask.  Hand hygeine performed before and after seeing the patient.  Declines COVID vaccine info.      4) H/O GHTN: BP normal, neg proteinuria. 24hr urine 102mg protein.      5) H/O SAB- Stopped vaginal progesterone.       6) H/O tubal reversal      7) H/O : With 3rd delivery, has 2 previous .  Reports d/t elevated BP. Pt desires RLTCS @ 39 wks.      8) AMA-  TSH  normal. NIPS neg, AFP low risk.      9) Pap NIL  (records in media).       10) SMA carrier- Partner has been tested and he is negative.     11) UTI x 1- Check urine culture today.     12) Disc that all pregnant women should get a Tdap shot in the third trimester, preferably between 27 weeks and 36 weeks of pregnancy. The Tdap shot is an effective and safe way to protect the baby from serious illness and complications of pertussis. Recommend that partners, family members, and infant caregivers should be up to date on theTdap vaccine if they have not previously been vaccinated. Ideally, all family members should be vaccinated at least 2 weeks before coming in contact with the . If not administered during pregnancy, the Tdap vaccine should be given immediately postpartum if the patient is not UTD on Tdap.       13) S>D- Check growth US      RTO 2 weeks for OB THAIS Rosales  12/3/2021  09:35 EST

## 2021-12-04 LAB
APPEARANCE UR: CLEAR
BACTERIA #/AREA URNS HPF: ABNORMAL /[HPF]
BACTERIA UR CULT: NORMAL
BACTERIA UR CULT: NORMAL
BILIRUB UR QL STRIP: NEGATIVE
CASTS URNS QL MICRO: ABNORMAL /LPF
COLOR UR: YELLOW
EPI CELLS #/AREA URNS HPF: >10 /HPF (ref 0–10)
GLUCOSE 1H P 75 G GLC PO SERPL-MCNC: 156 MG/DL (ref 65–179)
GLUCOSE 2H P 75 G GLC PO SERPL-MCNC: 137 MG/DL (ref 65–152)
GLUCOSE P FAST SERPL-MCNC: 80 MG/DL (ref 65–91)
GLUCOSE UR QL: NEGATIVE
HCT VFR BLD AUTO: 37 % (ref 34–46.6)
HGB BLD-MCNC: 12.1 G/DL (ref 11.1–15.9)
HGB UR QL STRIP: NEGATIVE
KETONES UR QL STRIP: NEGATIVE
LEUKOCYTE ESTERASE UR QL STRIP: ABNORMAL
MICRO URNS: ABNORMAL
NITRITE UR QL STRIP: NEGATIVE
PH UR STRIP: 6.5 [PH] (ref 5–7.5)
PROT UR QL STRIP: NEGATIVE
RBC #/AREA URNS HPF: ABNORMAL /HPF (ref 0–2)
SP GR UR: 1.02 (ref 1–1.03)
UROBILINOGEN UR STRIP-MCNC: 0.2 MG/DL (ref 0.2–1)
WBC #/AREA URNS HPF: ABNORMAL /HPF (ref 0–5)

## 2021-12-15 ENCOUNTER — ROUTINE PRENATAL (OUTPATIENT)
Dept: OBSTETRICS AND GYNECOLOGY | Facility: CLINIC | Age: 36
End: 2021-12-15

## 2021-12-15 VITALS — BODY MASS INDEX: 45.73 KG/M2 | SYSTOLIC BLOOD PRESSURE: 138 MMHG | WEIGHT: 242 LBS | DIASTOLIC BLOOD PRESSURE: 92 MMHG

## 2021-12-15 DIAGNOSIS — O14.00 ANTEPARTUM MILD PREECLAMPSIA: ICD-10-CM

## 2021-12-15 DIAGNOSIS — Z87.59 HISTORY OF GESTATIONAL HYPERTENSION: ICD-10-CM

## 2021-12-15 DIAGNOSIS — Z36.9 ENCOUNTER FOR ANTENATAL SCREENING, UNSPECIFIED: Primary | ICD-10-CM

## 2021-12-15 DIAGNOSIS — Z34.90 PREGNANCY, UNSPECIFIED GESTATIONAL AGE: ICD-10-CM

## 2021-12-15 DIAGNOSIS — Z14.8 GENETIC CARRIER: ICD-10-CM

## 2021-12-15 DIAGNOSIS — Z98.890 HISTORY OF REVERSAL OF TUBAL LIGATION: ICD-10-CM

## 2021-12-15 DIAGNOSIS — O26.849 FETAL SIZE INCONSISTENT WITH DATES: ICD-10-CM

## 2021-12-15 DIAGNOSIS — O23.42 URINARY TRACT INFECTION IN MOTHER DURING SECOND TRIMESTER OF PREGNANCY: ICD-10-CM

## 2021-12-15 DIAGNOSIS — O09.529 ANTEPARTUM MULTIGRAVIDA OF ADVANCED MATERNAL AGE: ICD-10-CM

## 2021-12-15 LAB
GLUCOSE UR STRIP-MCNC: NEGATIVE MG/DL
PROT UR STRIP-MCNC: NEGATIVE MG/DL

## 2021-12-15 PROCEDURE — 99214 OFFICE O/P EST MOD 30 MIN: CPT | Performed by: OBSTETRICS & GYNECOLOGY

## 2021-12-15 NOTE — PROGRESS NOTES
OB follow up     Theresa Nayg is a 36 y.o.  33w5d being seen today for her obstetrical visit.  Patient reports hand swelling. No HA or visual changes. Plans IUD postpartum. Fetal movement: normal.    Review of Systems  No bleeding, No cramping/contractions     /92   Wt 110 kg (242 lb)   LMP 2021 (Exact Date)   BMI 45.73 kg/m²     FHT: 145 BPM   Uterine Size: 34  cm       Assessment/Plan:    1) 36 y.o.  -pregnancy at 33w5d- 2hr GTT normal. Rh +.      2) Declines Tdap and flu shot.     3) COVID19 precautions were reviewed with the patient. Continue to encourage social distancing, wearing a mask, and good hand hygiene. 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 I wore a mask and gloves during this patient encounter.  Patient also wearing a surgical mask.  Hand hygeine performed before and after seeing the patient.  Declines COVID vaccine info.      4) H/O GHTN: BP high normal, neg proteinuria. 24hr urine baseline [id number] protein. check 24 urine repeat , CMP and CBC. Enc pt to get a wrist cuff and check BP BID.     5) H/O SAB- Stopped vaginal progesterone.       6) H/O tubal reversal , plans IUD postpartum     7) H/O : With 3rd delivery, has 2 previous .  Reports d/t elevated BP. Pt desires RLTCS @ 39 wks.      8) AMA-  TSH normal. NIPS neg, AFP low risk.      9) Pap NIL  (records in media).       10) SMA carrier- Partner has been tested and he is negative.      11) UTI x 1- Repeat  urine culture negative.      12) S>D- Check growth US next visit.last growth US on 12/3 was 53%    13) I saw the patient with a face mask, gloves and eye protection  The patient herself was masked.  Social distancing was observed as appropriate.    14)Reviewed this stage of pregnancy    15)Problem list updated     16) RTO 2 weeks OB int, GBS and US growth (S>D)      Paris Fitzgerald MD    12/15/2021  11:21 EST

## 2021-12-16 LAB
ALBUMIN SERPL-MCNC: 3.3 G/DL (ref 3.8–4.8)
ALBUMIN/GLOB SERPL: 1.2 {RATIO} (ref 1.2–2.2)
ALP SERPL-CCNC: 247 IU/L (ref 44–121)
ALT SERPL-CCNC: 21 IU/L (ref 0–32)
AST SERPL-CCNC: 15 IU/L (ref 0–40)
BASOPHILS # BLD AUTO: 0 X10E3/UL (ref 0–0.2)
BASOPHILS NFR BLD AUTO: 1 %
BILIRUB SERPL-MCNC: 0.2 MG/DL (ref 0–1.2)
BUN SERPL-MCNC: 7 MG/DL (ref 6–20)
BUN/CREAT SERPL: 15 (ref 9–23)
CALCIUM SERPL-MCNC: 8.8 MG/DL (ref 8.7–10.2)
CHLORIDE SERPL-SCNC: 104 MMOL/L (ref 96–106)
CO2 SERPL-SCNC: 19 MMOL/L (ref 20–29)
CREAT SERPL-MCNC: 0.46 MG/DL (ref 0.57–1)
EOSINOPHIL # BLD AUTO: 0.2 X10E3/UL (ref 0–0.4)
EOSINOPHIL NFR BLD AUTO: 2 %
ERYTHROCYTE [DISTWIDTH] IN BLOOD BY AUTOMATED COUNT: 13.3 % (ref 11.7–15.4)
GLOBULIN SER CALC-MCNC: 2.7 G/DL (ref 1.5–4.5)
GLUCOSE SERPL-MCNC: 114 MG/DL (ref 65–99)
HCT VFR BLD AUTO: 35.6 % (ref 34–46.6)
HGB BLD-MCNC: 11.7 G/DL (ref 11.1–15.9)
IMM GRANULOCYTES # BLD AUTO: 0.1 X10E3/UL (ref 0–0.1)
IMM GRANULOCYTES NFR BLD AUTO: 1 %
LYMPHOCYTES # BLD AUTO: 2 X10E3/UL (ref 0.7–3.1)
LYMPHOCYTES NFR BLD AUTO: 23 %
MCH RBC QN AUTO: 28.5 PG (ref 26.6–33)
MCHC RBC AUTO-ENTMCNC: 32.9 G/DL (ref 31.5–35.7)
MCV RBC AUTO: 87 FL (ref 79–97)
MONOCYTES # BLD AUTO: 0.5 X10E3/UL (ref 0.1–0.9)
MONOCYTES NFR BLD AUTO: 6 %
NEUTROPHILS # BLD AUTO: 6 X10E3/UL (ref 1.4–7)
NEUTROPHILS NFR BLD AUTO: 67 %
PLATELET # BLD AUTO: 244 X10E3/UL (ref 150–450)
POTASSIUM SERPL-SCNC: 4.2 MMOL/L (ref 3.5–5.2)
PROT SERPL-MCNC: 6 G/DL (ref 6–8.5)
RBC # BLD AUTO: 4.11 X10E6/UL (ref 3.77–5.28)
SODIUM SERPL-SCNC: 136 MMOL/L (ref 134–144)
WBC # BLD AUTO: 8.8 X10E3/UL (ref 3.4–10.8)

## 2021-12-19 RX ORDER — DOXYCYCLINE HYCLATE 50 MG/1
324 CAPSULE, GELATIN COATED ORAL
Qty: 30 TABLET | Refills: 6 | Status: SHIPPED | OUTPATIENT
Start: 2021-12-19 | End: 2022-07-01

## 2021-12-20 DIAGNOSIS — Z36.9 ENCOUNTER FOR ANTENATAL SCREENING, UNSPECIFIED: ICD-10-CM

## 2021-12-21 ENCOUNTER — TELEPHONE (OUTPATIENT)
Dept: OBSTETRICS AND GYNECOLOGY | Facility: CLINIC | Age: 36
End: 2021-12-21

## 2021-12-21 ENCOUNTER — HOSPITAL ENCOUNTER (OUTPATIENT)
Facility: HOSPITAL | Age: 36
Discharge: HOME | End: 2021-12-21
Attending: OBSTETRICS & GYNECOLOGY | Admitting: OBSTETRICS & GYNECOLOGY

## 2021-12-21 VITALS
BODY MASS INDEX: 45.69 KG/M2 | HEIGHT: 61 IN | WEIGHT: 242 LBS | SYSTOLIC BLOOD PRESSURE: 132 MMHG | HEART RATE: 71 BPM | TEMPERATURE: 98.5 F | DIASTOLIC BLOOD PRESSURE: 77 MMHG | RESPIRATION RATE: 18 BRPM | OXYGEN SATURATION: 98 %

## 2021-12-21 LAB
ALBUMIN SERPL-MCNC: 3.2 G/DL (ref 3.5–5.2)
ALBUMIN/GLOB SERPL: 1.1 G/DL
ALP SERPL-CCNC: 252 U/L (ref 39–117)
ALT SERPL W P-5'-P-CCNC: 19 U/L (ref 1–33)
AMPHET+METHAMPHET UR QL: NEGATIVE
AMPHETAMINES UR QL: NEGATIVE
ANION GAP SERPL CALCULATED.3IONS-SCNC: 10.4 MMOL/L (ref 5–15)
AST SERPL-CCNC: 15 U/L (ref 1–32)
BACTERIA UR QL AUTO: ABNORMAL /HPF
BARBITURATES UR QL SCN: NEGATIVE
BENZODIAZ UR QL SCN: NEGATIVE
BILIRUB SERPL-MCNC: 0.2 MG/DL (ref 0–1.2)
BILIRUB UR QL STRIP: NEGATIVE
BUN SERPL-MCNC: 7 MG/DL (ref 6–20)
BUN/CREAT SERPL: 11.1 (ref 7–25)
BUPRENORPHINE SERPL-MCNC: NEGATIVE NG/ML
CALCIUM SPEC-SCNC: 8.5 MG/DL (ref 8.6–10.5)
CANNABINOIDS SERPL QL: NEGATIVE
CHLORIDE SERPL-SCNC: 103 MMOL/L (ref 98–107)
CLARITY UR: CLEAR
CO2 SERPL-SCNC: 18.6 MMOL/L (ref 22–29)
COCAINE UR QL: NEGATIVE
COLOR UR: YELLOW
CREAT SERPL-MCNC: 0.63 MG/DL (ref 0.57–1)
DEPRECATED RDW RBC AUTO: 43.1 FL (ref 37–54)
ERYTHROCYTE [DISTWIDTH] IN BLOOD BY AUTOMATED COUNT: 13.3 % (ref 12.3–15.4)
GFR SERPL CREATININE-BSD FRML MDRD: 107 ML/MIN/1.73
GLOBULIN UR ELPH-MCNC: 2.8 GM/DL
GLUCOSE SERPL-MCNC: 141 MG/DL (ref 65–99)
GLUCOSE UR STRIP-MCNC: NEGATIVE MG/DL
HCT VFR BLD AUTO: 34.9 % (ref 34–46.6)
HGB BLD-MCNC: 11.2 G/DL (ref 12–15.9)
HGB UR QL STRIP.AUTO: ABNORMAL
HYALINE CASTS UR QL AUTO: ABNORMAL /LPF
KETONES UR QL STRIP: NEGATIVE
LEUKOCYTE ESTERASE UR QL STRIP.AUTO: ABNORMAL
MCH RBC QN AUTO: 28.1 PG (ref 26.6–33)
MCHC RBC AUTO-ENTMCNC: 32.1 G/DL (ref 31.5–35.7)
MCV RBC AUTO: 87.5 FL (ref 79–97)
METHADONE UR QL SCN: NEGATIVE
NITRITE UR QL STRIP: NEGATIVE
OPIATES UR QL: NEGATIVE
OXYCODONE UR QL SCN: NEGATIVE
PCP UR QL SCN: NEGATIVE
PH UR STRIP.AUTO: 6 [PH] (ref 4.5–8)
PLATELET # BLD AUTO: 225 10*3/MM3 (ref 140–450)
PMV BLD AUTO: 10.7 FL (ref 6–12)
POTASSIUM SERPL-SCNC: 3.8 MMOL/L (ref 3.5–5.2)
PROPOXYPH UR QL: NEGATIVE
PROT 24H UR-MRATE: 433 MG/24 HR (ref 30–150)
PROT SERPL-MCNC: 6 G/DL (ref 6–8.5)
PROT UR QL STRIP: NEGATIVE
PROT UR-MCNC: 30.9 MG/DL
RBC # BLD AUTO: 3.99 10*6/MM3 (ref 3.77–5.28)
RBC # UR STRIP: ABNORMAL /HPF
REF LAB TEST METHOD: ABNORMAL
SODIUM SERPL-SCNC: 132 MMOL/L (ref 136–145)
SP GR UR STRIP: 1.02 (ref 1–1.03)
SQUAMOUS #/AREA URNS HPF: ABNORMAL /HPF
TRICYCLICS UR QL SCN: NEGATIVE
UROBILINOGEN UR QL STRIP: ABNORMAL
WBC # UR STRIP: ABNORMAL /HPF
WBC NRBC COR # BLD: 8.2 10*3/MM3 (ref 3.4–10.8)

## 2021-12-21 PROCEDURE — 81001 URINALYSIS AUTO W/SCOPE: CPT | Performed by: OBSTETRICS & GYNECOLOGY

## 2021-12-21 PROCEDURE — G0463 HOSPITAL OUTPT CLINIC VISIT: HCPCS

## 2021-12-21 PROCEDURE — 59025 FETAL NON-STRESS TEST: CPT | Performed by: OBSTETRICS & GYNECOLOGY

## 2021-12-21 PROCEDURE — 59025 FETAL NON-STRESS TEST: CPT

## 2021-12-21 PROCEDURE — 80306 DRUG TEST PRSMV INSTRMNT: CPT | Performed by: OBSTETRICS & GYNECOLOGY

## 2021-12-21 PROCEDURE — 80053 COMPREHEN METABOLIC PANEL: CPT | Performed by: OBSTETRICS & GYNECOLOGY

## 2021-12-21 PROCEDURE — 85027 COMPLETE CBC AUTOMATED: CPT | Performed by: OBSTETRICS & GYNECOLOGY

## 2021-12-21 RX ORDER — LIDOCAINE HYDROCHLORIDE 10 MG/ML
5 INJECTION, SOLUTION EPIDURAL; INFILTRATION; INTRACAUDAL; PERINEURAL AS NEEDED
Status: DISCONTINUED | OUTPATIENT
Start: 2021-12-21 | End: 2021-12-21 | Stop reason: HOSPADM

## 2021-12-21 RX ORDER — SODIUM CHLORIDE 0.9 % (FLUSH) 0.9 %
10 SYRINGE (ML) INJECTION AS NEEDED
Status: DISCONTINUED | OUTPATIENT
Start: 2021-12-21 | End: 2021-12-21 | Stop reason: HOSPADM

## 2021-12-21 RX ORDER — SODIUM CHLORIDE 0.9 % (FLUSH) 0.9 %
3 SYRINGE (ML) INJECTION EVERY 12 HOURS SCHEDULED
Status: DISCONTINUED | OUTPATIENT
Start: 2021-12-21 | End: 2021-12-21 | Stop reason: HOSPADM

## 2021-12-21 NOTE — PROGRESS NOTES
PIP= 24 hour urine is abnormal at 433. She has mild preeclampsia and needs to be seen weekly for BPP/NST and OB visit.

## 2021-12-21 NOTE — NON STRESS TEST
Theresa Nagy, a  at 34w4d with an MARCUS of 2022, by Last Menstrual Period, was seen at [facility] for a nonstress test.    Chief Complaint   Patient presents with   • Elevated Blood Pressure     AT HOME /109, OFFICE TOLD  HER TO COME GET HER BP CHECKED, PT STATES SHE HAS HAD SPOTS IN HER EYES AND LIGHT HEADED LAST NIGHT AND ON OCCASION       Patient Active Problem List   Diagnosis   • Infertility management   • History of reversal of tubal ligation   • H/O one miscarriage   • Antepartum multigravida of advanced maternal age- NIPT low risk, AFP neg   • Threatened    • History of gestational hypertension: Baseline 24 hr urine=102    • SMA carrier: Partner testing= FOB neg   • Prenatal care in second trimester   • Adnexal mass   • Urinary tract infection in mother during second trimester of pregnancy   • Vaginal discharge   • Cervical funneling   • Fibroids   • Vaginal bleeding in pregnancy, second trimester   • Encounter for  screening, unspecified   • Fetal size inconsistent with dates     CBC, CMP drawn viewed by Dr Fitzgerald, serial bp's wnl.  Start Time: 1528  Stop Time: 1650    Interpretation A  Nonstress Test Interpretation A: Reactive (21 1549 : Sarah Valdez, RN)

## 2021-12-21 NOTE — TELEPHONE ENCOUNTER
Informed pt of lab results. Pt stated she has been taking her BP and below is her results.   17th- 148/96  18th- 155/97  19th- 147/96  20th- 147/102    I advised the pt she should go to L&D to be monitored. Pt just wanted to make sure doctor approved she should go? CORNELIO

## 2021-12-21 NOTE — TELEPHONE ENCOUNTER
Pt has mild preeclampsia. With those elevated BPs she should come to L&D for serial BPS, labs and NST.

## 2021-12-22 ENCOUNTER — ROUTINE PRENATAL (OUTPATIENT)
Dept: OBSTETRICS AND GYNECOLOGY | Facility: CLINIC | Age: 36
End: 2021-12-22

## 2021-12-22 VITALS — SYSTOLIC BLOOD PRESSURE: 148 MMHG | BODY MASS INDEX: 46.03 KG/M2 | WEIGHT: 243.6 LBS | DIASTOLIC BLOOD PRESSURE: 96 MMHG

## 2021-12-22 DIAGNOSIS — O09.529 ANTEPARTUM MULTIGRAVIDA OF ADVANCED MATERNAL AGE: Primary | ICD-10-CM

## 2021-12-22 PROBLEM — Z34.92 PRENATAL CARE IN SECOND TRIMESTER: Status: RESOLVED | Noted: 2021-08-16 | Resolved: 2021-12-22

## 2021-12-22 PROBLEM — O46.92 VAGINAL BLEEDING IN PREGNANCY, SECOND TRIMESTER: Status: RESOLVED | Noted: 2021-08-30 | Resolved: 2021-12-22

## 2021-12-22 PROBLEM — O14.00 ANTEPARTUM MILD PREECLAMPSIA: Status: ACTIVE | Noted: 2021-12-22

## 2021-12-22 LAB
GLUCOSE UR STRIP-MCNC: NEGATIVE MG/DL
PROT UR STRIP-MCNC: NEGATIVE MG/DL

## 2021-12-22 PROCEDURE — 99214 OFFICE O/P EST MOD 30 MIN: CPT | Performed by: OBSTETRICS & GYNECOLOGY

## 2021-12-22 RX ORDER — LABETALOL 100 MG/1
100 TABLET, FILM COATED ORAL 2 TIMES DAILY
Qty: 60 TABLET | Refills: 6 | Status: SHIPPED | OUTPATIENT
Start: 2021-12-22 | End: 2022-01-09 | Stop reason: HOSPADM

## 2021-12-22 NOTE — PROGRESS NOTES
Ob visit progress note      S:  Pt here for ANT for mild preeclampsia, newly diagnosed.  Pt complains of intermittent headache w/ occ visual changes.  Pt ok today. Reports good fetal movement.     O:  /96   Wt 110 kg (243 lb 9.6 oz)   LMP 2021 (Exact Date)   BMI 46.03 kg/m²     BPP/NST = 10/10    See flow sheet.    A:  36 y.o. @34w5d       Mild preeclampsia       Reassuring fetal status    P:  Start labetalol  Labor warnings reviewed.  Deliver @ 37 weeks.  Pt given preeclampsia warnings.    I spent 30+ minutes caring for Theresa on this date of service. This time includes time spent by me in the following activities: preparing for the visit, reviewing tests, obtaining and/or reviewing a separately obtained history, performing a medically appropriate examination and/or evaluation, counseling and educating the patient/family/caregiver, ordering medications, tests, or procedures, referring and communicating with other health care professionals, documenting information in the medical record, independently interpreting results and communicating that information with the patient/family/caregiver and care coordination    Wilfredo Delcid MD  11:29 EST

## 2021-12-23 ENCOUNTER — HOSPITAL ENCOUNTER (OUTPATIENT)
Facility: HOSPITAL | Age: 36
Discharge: HOME | End: 2021-12-23
Attending: OBSTETRICS & GYNECOLOGY | Admitting: OBSTETRICS & GYNECOLOGY

## 2021-12-23 VITALS
HEART RATE: 71 BPM | TEMPERATURE: 98.4 F | RESPIRATION RATE: 20 BRPM | DIASTOLIC BLOOD PRESSURE: 87 MMHG | SYSTOLIC BLOOD PRESSURE: 137 MMHG

## 2021-12-23 PROBLEM — Z34.90 PREGNANCY: Status: ACTIVE | Noted: 2021-12-23

## 2021-12-23 LAB
AMPHET+METHAMPHET UR QL: NEGATIVE
AMPHETAMINES UR QL: NEGATIVE
BACTERIA UR QL AUTO: ABNORMAL /HPF
BARBITURATES UR QL SCN: NEGATIVE
BENZODIAZ UR QL SCN: NEGATIVE
BILIRUB UR QL STRIP: NEGATIVE
BUPRENORPHINE SERPL-MCNC: NEGATIVE NG/ML
CANNABINOIDS SERPL QL: NEGATIVE
CLARITY UR: CLEAR
COCAINE UR QL: NEGATIVE
COLOR UR: YELLOW
GLUCOSE UR STRIP-MCNC: NEGATIVE MG/DL
HGB UR QL STRIP.AUTO: ABNORMAL
HYALINE CASTS UR QL AUTO: ABNORMAL /LPF
KETONES UR QL STRIP: NEGATIVE
LEUKOCYTE ESTERASE UR QL STRIP.AUTO: ABNORMAL
METHADONE UR QL SCN: NEGATIVE
NITRITE UR QL STRIP: NEGATIVE
OPIATES UR QL: NEGATIVE
OXYCODONE UR QL SCN: NEGATIVE
PCP UR QL SCN: NEGATIVE
PH UR STRIP.AUTO: 6 [PH] (ref 4.5–8)
PROPOXYPH UR QL: NEGATIVE
PROT UR QL STRIP: NEGATIVE
RBC # UR STRIP: ABNORMAL /HPF
REF LAB TEST METHOD: ABNORMAL
SP GR UR STRIP: 1.03 (ref 1–1.03)
SQUAMOUS #/AREA URNS HPF: ABNORMAL /HPF
TRICYCLICS UR QL SCN: NEGATIVE
UROBILINOGEN UR QL STRIP: ABNORMAL
WBC # UR STRIP: ABNORMAL /HPF

## 2021-12-23 PROCEDURE — G0463 HOSPITAL OUTPT CLINIC VISIT: HCPCS

## 2021-12-23 PROCEDURE — 59025 FETAL NON-STRESS TEST: CPT

## 2021-12-23 PROCEDURE — 99213 OFFICE O/P EST LOW 20 MIN: CPT | Performed by: OBSTETRICS & GYNECOLOGY

## 2021-12-23 PROCEDURE — 80306 DRUG TEST PRSMV INSTRMNT: CPT | Performed by: OBSTETRICS & GYNECOLOGY

## 2021-12-23 PROCEDURE — 59025 FETAL NON-STRESS TEST: CPT | Performed by: OBSTETRICS & GYNECOLOGY

## 2021-12-23 PROCEDURE — 81001 URINALYSIS AUTO W/SCOPE: CPT | Performed by: OBSTETRICS & GYNECOLOGY

## 2021-12-23 RX ORDER — LIDOCAINE HYDROCHLORIDE 10 MG/ML
5 INJECTION, SOLUTION EPIDURAL; INFILTRATION; INTRACAUDAL; PERINEURAL AS NEEDED
Status: DISCONTINUED | OUTPATIENT
Start: 2021-12-23 | End: 2021-12-23 | Stop reason: HOSPADM

## 2021-12-23 RX ORDER — SODIUM CHLORIDE 0.9 % (FLUSH) 0.9 %
10 SYRINGE (ML) INJECTION AS NEEDED
Status: DISCONTINUED | OUTPATIENT
Start: 2021-12-23 | End: 2021-12-23 | Stop reason: HOSPADM

## 2021-12-23 RX ORDER — SODIUM CHLORIDE, SODIUM LACTATE, POTASSIUM CHLORIDE, CALCIUM CHLORIDE 600; 310; 30; 20 MG/100ML; MG/100ML; MG/100ML; MG/100ML
125 INJECTION, SOLUTION INTRAVENOUS CONTINUOUS
Status: DISCONTINUED | OUTPATIENT
Start: 2021-12-23 | End: 2021-12-23 | Stop reason: HOSPADM

## 2021-12-23 RX ORDER — SODIUM CHLORIDE 0.9 % (FLUSH) 0.9 %
3 SYRINGE (ML) INJECTION EVERY 12 HOURS SCHEDULED
Status: DISCONTINUED | OUTPATIENT
Start: 2021-12-23 | End: 2021-12-23 | Stop reason: HOSPADM

## 2021-12-23 NOTE — NON STRESS TEST
Theresa Nagy, a  at 34w6d with an MARCUS of 2022, by Last Menstrual Period, was seen at [facility] for a nonstress test.    Chief Complaint   Patient presents with    Decreased Fetal Movement       Patient Active Problem List   Diagnosis    Infertility management    History of reversal of tubal ligation    H/O one miscarriage    Antepartum multigravida of advanced maternal age- NIPT low risk, AFP neg    Threatened     History of gestational hypertension: Baseline 24 hr urine=102     SMA carrier: Partner testing= FOB neg    Adnexal mass    Urinary tract infection in mother during second trimester of pregnancy    Vaginal discharge    Cervical funneling    Fibroids    Encounter for  screening, unspecified    Fetal size inconsistent with dates    Antepartum mild preeclampsia: deliver @ 37 wks    Pregnancy       Start Time: 1151  Stop Time: 1249    Interpretation B  Nonstress Test Interpretation B: Reactive (21 1300 : Aminata Hogan, RN)      Patient Care Team:  Paris Maldonado APRN as PCP - General (Family Medicine)  Roxana Shen APRN as Nurse Navigator    Patient new to practice? No  Patient new to examiner? No  Patient referred? No  -----------------------------------------------------HISTORY---------------------------------------------------    Chief Complaint:   Chief Complaint   Patient presents with    Decreased Fetal Movement     New problem to examiner? Yes    36 y.o.  Patient's last menstrual period was 2021 (exact date).    HPI: History of Present Illness      Pt states she has not felt her baby move since last night.  Denies bleeding or contractions.  Was seen in the office yesterday for  testing with a score of 10/10.    PFSH:   1.    Past Medical History:   Diagnosis Date    Anxiety     Depression      2.   Family History   Problem Relation Age of Onset    Depression Mother     Anxiety disorder Mother     Diabetes  Father     Hypertension Father     Heart disease Father        PAST HISTORY REVIEWED:  1.   Past Surgical History:   Procedure Laterality Date     SECTION      CHOLECYSTECTOMY      TUBAL ABDOMINAL LIGATION      and reversal      2. No current facility-administered medications for this encounter.    Current Outpatient Medications:     ferrous gluconate (FERGON) 324 MG tablet, Take 1 tablet by mouth Daily With Breakfast., Disp: 30 tablet, Rfl: 6    labetalol (NORMODYNE) 100 MG tablet, Take 1 tablet by mouth 2 (Two) Times a Day., Disp: 60 tablet, Rfl: 6    Prenatal Vit-Fe Fumarate-FA (Prenatal Vitamin) 27-0.8 MG tablet, Take  by mouth., Disp: , Rfl:   3. No Known Allergies    ROS:  Review of Systems   Constitutional: Negative.    HENT: Negative.    Respiratory: Negative.    Cardiovascular: Negative.    Gastrointestinal: neg  Endocrine: Negative.    Genitourinary: Negative  Musculoskeletal: Negative.    Skin: Negative.    Allergic/Immunologic: Negative.    Neurological: Negative.    Hematological: Negative.    Psychiatric/Behavioral: Negative.      -----------------------------------------------PHYSICAL EXAM----------------------------------------------    Vital Signs: /87   Pulse 71   Temp 98.4 °F (36.9 °C) (Oral)   Resp 20   LMP 2021 (Exact Date)      Physical Exam:  Physical Exam   Constitutional: She is oriented to person, place, and time.   She appears well-developed and well-nourished.   HENT:   Head: Normocephalic and atraumatic.   Eyes: EOM are normal.   Neck: Normal range of motion.   Cardiovascular: Normal rate.   Pulmonary/Chest: Effort normal.   Abdominal: Soft. She exhibits no distension and no mass.   There is no tenderness.   There is no guarding.   Genitourinary: No vaginal discharge found.   Cervix checked: no  Musculoskeletal: Normal range of motion. She exhibits no edema,   tenderness or deformity.   Neurological: She is alert and oriented to person, place, and time.   Skin:  Skin is warm and dry. No rash noted. No erythema. No pallor.   Psychiatric: She has a normal mood and affect. Her behavior is normal.   Judgment and thought content normal.     I saw the patient with a face mask, gloves and eye protection  The patient herself was masked.  Social distancing was observed as appropriate. All COVID precautions observed.   -----------------------------------------------MEDICAL DECISION MAKING-----------------------------      DATA Review & labs ordered:     1.   Lab Results (last 24 hours)       Procedure Component Value Units Date/Time    Urine Drug Screen - [[id number]]  (Normal) Collected: 12/23/21 1156    Specimen: Urine Updated: 12/23/21 1225     THC, Screen, Urine Negative     Phencyclidine (PCP), Urine Negative     Cocaine Screen, Urine Negative     Methamphetamine, Ur Negative     Opiate Screen Negative     Amphetamine Screen, Urine Negative     Benzodiazepine Screen, Urine Negative     Tricyclic Antidepressants Screen Negative     Methadone Screen, Urine Negative     Barbiturates Screen, Urine Negative     Oxycodone Screen, Urine Negative     Propoxyphene Screen Negative     Buprenorphine, Screen, Urine Negative    Narrative:      Urine drug screen results are to be used for medical purposes only.  They are not to be used for legal purposes such as employment testing.  Negative results do not necessarily mean the complete absence of a subtance, but rather that the result is less than the cutoff for that substance.  Positive results are unconfirmed and considered Preliminary Positive.  [facility] does not automatically confirm Postitive Unconfirmed results.  The physician may request (order) an Unconfirmed Positive result to be sent out for confirmation.      Negative Thresholds for Drugs Screened:    THC screen, urine                          50 ng/ml  Phenycyclidine (PCP), urine                25 ng/ml  Cocaine screen, urine                     150  ng/ml  Methamphetamine, urine                    500 ng/ml  Opiate screen, urine                      100 ng/ml  Amphetamine screen, urine                 500 ng/ml  Benzodiazepine screen, urine              150 ng/ml  Tricyclic Antidepressants screen, urine   300 ng/ml  Methadone screen, urine                   200 ng/ml  Barbiturates screen, urine                200 ng/ml  Oxycodone screen, urine                   100 ng/ml  Propoxyphene screen, urine                300 ng/ml  Buprenorphine screen, urine                10 ng/ml    Urinalysis With Microscopic If Indicated (No Culture) - Urine, Clean Catch [[id number]]  (Abnormal) Collected: 21 1156    Specimen: Urine, Clean Catch Updated: 21 1224     Color, UA Yellow     Appearance, UA Clear     pH, UA 6.0     Specific Gravity, UA 1.028     Comment: Result obtained by Refractometer        Glucose, UA Negative     Ketones, UA Negative     Bilirubin, UA Negative     Blood, UA Trace     Protein, UA Negative     Leuk Esterase, UA Small (1+)     Nitrite, UA Negative     Urobilinogen, UA 0.2 E.U./dL    Urinalysis, Microscopic Only - Urine, Clean Catch [[id number]]  (Abnormal) Collected: 21 1156    Specimen: Urine, Clean Catch Updated: 21 1224     RBC, UA None Seen /HPF      WBC, UA 13-20 /HPF      Bacteria, UA Trace /HPF      Squamous Epithelial Cells, UA 3-6 /HPF      Hyaline Casts, UA None Seen /LPF      Methodology Manual Light Microscopy          2.   Imaging Results (Last 24 Hours)       ** No results found for the last 24 hours. **          3.   ECG/EMG Results (most recent)       None              NST INTERPRETATION:    Indication for test:   Chief Complaint   Patient presents with    Decreased Fetal Movement     Interpretation: reactive  Fetal Heart Rate Baseline: 140's  Periodic Changes: present  Contractions present? none  NST duration:  Over 20 minutes....              IMPRESSION:  36 y.o.  @ [id number] with mild preeclampsia.  Bp  noted.                              Decreased fetal movement with reassuring fetal heart rate tracing.    Patient Active Problem List   Diagnosis    Infertility management    History of reversal of tubal ligation    H/O one miscarriage    Antepartum multigravida of advanced maternal age- NIPT low risk, AFP neg    Threatened     History of gestational hypertension: Baseline 24 hr urine=102     SMA carrier: Partner testing= FOB neg    Adnexal mass    Urinary tract infection in mother during second trimester of pregnancy    Vaginal discharge    Cervical funneling    Fibroids    Encounter for  screening, unspecified    Fetal size inconsistent with dates    Antepartum mild preeclampsia: deliver @ 37 wks    Pregnancy       Established problem/s? No   Worsening? No  New Problem/s? Yes   Additional workup planned? No    PLAN: home     Bedrest: only up to the bathroom until you've seen your doctor or nurse midwife.    Please keep your next regularly scheduled appointment.    Call your doctor if you notice: increased leakage or fluid from the vagina, contractions more than 10 per 1 hour, decreased fetal movement, low back pain or cramping that doesn't go away, bleeding from vaginal area, difficulty peeing, pain with peeing, difficulty breathing, new calf pain, headache that doesn't go away or vision change.    Normal diet as tolerated.    Perform a kick count once a day at approximately the same time each day. Baby's activity levels are usually higher in the evening after dinner.To perform a kick count, lie on your left side and focus on your baby's movements: rolls, kicks, or flutters. Record the number of minutes it takes to feel your baby move ten times. You may stop counting after your baby has moved 5 times.    If your baby does not move at least 5 times in 1 hr or if there is a sudden decrease in movement, call the office ([phone])      ASSESSMENT:      PLAN: RETURN TO OFFICE AS SCHEDULED, CALL FOR  PROBLEMS    Time:  Total face-to-face/floor time 20+ min.I spent 20+ minutes caring for Theresa on this date of service. This time includes time spent by me in the following activities: preparing for the visit, reviewing tests, obtaining and/or reviewing a separately obtained history, performing a medically appropriate examination and/or evaluation, counseling and educating the patient/family/caregiver, ordering medications, tests, or procedures, referring and communicating with other health care professionals, documenting information in the medical record, independently interpreting results and communicating that information with the patient/family/caregiver, care coordination, and bedside NST interpretation with counseling.         Wilfredo Delcid MD  12/23/2021  16:17 EST

## 2021-12-26 PROBLEM — Z31.9 INFERTILITY MANAGEMENT: Status: RESOLVED | Noted: 2021-05-05 | Resolved: 2021-12-26

## 2021-12-30 ENCOUNTER — ROUTINE PRENATAL (OUTPATIENT)
Dept: OBSTETRICS AND GYNECOLOGY | Facility: CLINIC | Age: 36
End: 2021-12-30

## 2021-12-30 VITALS — DIASTOLIC BLOOD PRESSURE: 78 MMHG | SYSTOLIC BLOOD PRESSURE: 132 MMHG | BODY MASS INDEX: 45.91 KG/M2 | WEIGHT: 243 LBS

## 2021-12-30 DIAGNOSIS — Z3A.35 35 WEEKS GESTATION OF PREGNANCY: Primary | ICD-10-CM

## 2021-12-30 DIAGNOSIS — Z36.85 ANTENATAL SCREENING FOR STREPTOCOCCUS B: ICD-10-CM

## 2021-12-30 DIAGNOSIS — O14.00 ANTEPARTUM MILD PREECLAMPSIA: ICD-10-CM

## 2021-12-30 DIAGNOSIS — O09.529 ANTEPARTUM MULTIGRAVIDA OF ADVANCED MATERNAL AGE: ICD-10-CM

## 2021-12-30 DIAGNOSIS — O14.03 MILD TO MODERATE PRE-ECLAMPSIA IN THIRD TRIMESTER: ICD-10-CM

## 2021-12-30 PROBLEM — Z36.9 ENCOUNTER FOR ANTENATAL SCREENING, UNSPECIFIED: Status: RESOLVED | Noted: 2021-12-15 | Resolved: 2021-12-30

## 2021-12-30 LAB
GLUCOSE UR STRIP-MCNC: NEGATIVE MG/DL
PROT UR STRIP-MCNC: NEGATIVE MG/DL

## 2021-12-30 PROCEDURE — 99214 OFFICE O/P EST MOD 30 MIN: CPT | Performed by: OBSTETRICS & GYNECOLOGY

## 2021-12-30 NOTE — PROGRESS NOTES
OB follow up     Chief Complaint: [facility] FU    Theresa Nagy is a 36 y.o.  35w6d being seen today for her obstetrical visit. This is the first time I am seeing this pt in pregnancy. She has been dx with mild preeclampsia by elevated 24hr urine. She is on labetalol 100mg po bid. She had BPP/NST today. Pt has hx of  x2 and one C section 8 years ago. She is interested in another C section. She is unsure about future children. This pregnancy is a result of a tubal reversal.  Patient reports no complaints. Fetal movement: normal.    Review of Systems  No bleeding, No cramping/contractions     /78   Wt 110 kg (243 lb)   LMP 2021 (Exact Date)   BMI 45.91 kg/m²     FHT: present    Uterine Size:           Assessment/Plan: Low risk pregnancy    1) pregnancy at 35w6d: check GBBS. EFW 31% and MARIMAR 11cm.     2) Declines Tdap and flu shot.     3) COVID19 precautions were previously reviewed with the patient. Continue to encourage social distancing, wearing a mask, and good hand hygiene. 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 I wore a mask and gloves during this patient encounter.  Patient also wearing a surgical mask.  Hand hygeine performed before and after seeing the patient.  Declines COVID vaccine info.      4) Mild preeclampsia: 24hr urine 433mg of protein on 21. On Labetalol 100mg po bid. Pt getting weekly NST/BPP. 10/10 today. Check CMP, CBC, uric acid today. FU on Monday for NST/BPP. Pt finding out today that she will need to be delivered at 37 weeks. US for growth today: 31%. MARIMAR 11cm     5) H/O SAB- Stopped vaginal progesterone.       6) H/O tubal reversal , plans IUD postpartum     7) H/O : With 3rd delivery, has 2 previous .  Reports d/t elevated BP. Pt desires RLTCS. Will need to be done at 37weeks.      8) AMA-  TSH normal. NIPS neg, AFP low risk.      9) SMA carrier- Partner has been tested and he is negative.            Reviewed this stage of pregnancy  Problem list updated   No  follow-ups on file.      Malathi Fuentes DO    12/30/2021  10:40 EST

## 2021-12-31 LAB
ALBUMIN SERPL-MCNC: 3.4 G/DL (ref 3.8–4.8)
ALBUMIN/GLOB SERPL: 1.2 {RATIO} (ref 1.2–2.2)
ALP SERPL-CCNC: 285 IU/L (ref 44–121)
ALT SERPL-CCNC: 22 IU/L (ref 0–32)
AST SERPL-CCNC: 17 IU/L (ref 0–40)
BILIRUB SERPL-MCNC: <0.2 MG/DL (ref 0–1.2)
BUN SERPL-MCNC: 7 MG/DL (ref 6–20)
BUN/CREAT SERPL: 13 (ref 9–23)
CALCIUM SERPL-MCNC: 9.4 MG/DL (ref 8.7–10.2)
CHLORIDE SERPL-SCNC: 104 MMOL/L (ref 96–106)
CO2 SERPL-SCNC: 20 MMOL/L (ref 20–29)
CREAT SERPL-MCNC: 0.55 MG/DL (ref 0.57–1)
ERYTHROCYTE [DISTWIDTH] IN BLOOD BY AUTOMATED COUNT: 13.4 % (ref 11.7–15.4)
GLOBULIN SER CALC-MCNC: 2.8 G/DL (ref 1.5–4.5)
GLUCOSE SERPL-MCNC: 91 MG/DL (ref 65–99)
HCT VFR BLD AUTO: 35.7 % (ref 34–46.6)
HGB BLD-MCNC: 12 G/DL (ref 11.1–15.9)
MCH RBC QN AUTO: 28.9 PG (ref 26.6–33)
MCHC RBC AUTO-ENTMCNC: 33.6 G/DL (ref 31.5–35.7)
MCV RBC AUTO: 86 FL (ref 79–97)
PLATELET # BLD AUTO: 238 X10E3/UL (ref 150–450)
POTASSIUM SERPL-SCNC: 4.5 MMOL/L (ref 3.5–5.2)
PROT SERPL-MCNC: 6.2 G/DL (ref 6–8.5)
RBC # BLD AUTO: 4.15 X10E6/UL (ref 3.77–5.28)
SODIUM SERPL-SCNC: 137 MMOL/L (ref 134–144)
URATE SERPL-MCNC: 4.5 MG/DL (ref 2.6–6.2)
WBC # BLD AUTO: 8.6 X10E3/UL (ref 3.4–10.8)

## 2022-01-01 LAB — GP B STREP DNA SPEC QL NAA+PROBE: NEGATIVE

## 2022-01-03 ENCOUNTER — ROUTINE PRENATAL (OUTPATIENT)
Dept: OBSTETRICS AND GYNECOLOGY | Facility: CLINIC | Age: 37
End: 2022-01-03

## 2022-01-03 VITALS — WEIGHT: 245 LBS | DIASTOLIC BLOOD PRESSURE: 88 MMHG | BODY MASS INDEX: 46.29 KG/M2 | SYSTOLIC BLOOD PRESSURE: 136 MMHG

## 2022-01-03 DIAGNOSIS — Z98.891 PREVIOUS CESAREAN SECTION: Primary | ICD-10-CM

## 2022-01-03 DIAGNOSIS — O14.00 ANTEPARTUM MILD PREECLAMPSIA: ICD-10-CM

## 2022-01-03 LAB
GLUCOSE UR STRIP-MCNC: NEGATIVE MG/DL
GLUCOSE UR STRIP-MCNC: NEGATIVE MG/DL
PROT UR STRIP-MCNC: NEGATIVE MG/DL
PROT UR STRIP-MCNC: NEGATIVE MG/DL

## 2022-01-03 PROCEDURE — 99214 OFFICE O/P EST MOD 30 MIN: CPT | Performed by: OBSTETRICS & GYNECOLOGY

## 2022-01-03 RX ORDER — CARBOPROST TROMETHAMINE 250 UG/ML
250 INJECTION, SOLUTION INTRAMUSCULAR AS NEEDED
Status: CANCELLED | OUTPATIENT
Start: 2022-01-03

## 2022-01-03 RX ORDER — METHYLERGONOVINE MALEATE 0.2 MG/ML
200 INJECTION INTRAVENOUS ONCE AS NEEDED
Status: CANCELLED | OUTPATIENT
Start: 2022-01-03

## 2022-01-03 RX ORDER — LIDOCAINE HYDROCHLORIDE 10 MG/ML
5 INJECTION, SOLUTION EPIDURAL; INFILTRATION; INTRACAUDAL; PERINEURAL AS NEEDED
Status: CANCELLED | OUTPATIENT
Start: 2022-01-03

## 2022-01-03 RX ORDER — SODIUM CHLORIDE 0.9 % (FLUSH) 0.9 %
10 SYRINGE (ML) INJECTION EVERY 12 HOURS SCHEDULED
Status: CANCELLED | OUTPATIENT
Start: 2022-01-03

## 2022-01-03 RX ORDER — SODIUM CHLORIDE 0.9 % (FLUSH) 0.9 %
1-10 SYRINGE (ML) INJECTION AS NEEDED
Status: CANCELLED | OUTPATIENT
Start: 2022-01-03

## 2022-01-03 RX ORDER — MISOPROSTOL 100 UG/1
800 TABLET ORAL AS NEEDED
Status: CANCELLED | OUTPATIENT
Start: 2022-01-03

## 2022-01-03 NOTE — PROGRESS NOTES
CC: Pt here for ob office visit and ANT for mild preeclampsia, BMI.    HPI: Theresa Nagy is a 36 y.o.  36w3d being seen today for her obstetrical visit.   Patient reports heartburn .   Fetal movement: normal. .        ROS: Pt denies visual changes, headaches, shortness of breath, chest pain, esophageal reflux, gastric pain,   nausea and vomiting, diarrhea, rashes, vaginal bleeding, edema, hip pain, pelvic pressure.     SMOKER? No    ALCOHOL? No  ILLICIT DRUGS? No    O:  /88   Wt 111 kg (245 lb)   LMP 2021 (Exact Date)   BMI 46.29 kg/m² , additional findings in addition to above flow sheet?: ANT = 10/10, u/s wnl.  NST reactive    Data Review:  UA, flow sheet,  TESTING: u/s: see report: BPP = , MARIMAR = 10, NST = R = 10/10    Lab Results (last 24 hours)     Procedure Component Value Units Date/Time    POC Urinalysis Dipstick [[id number]] Resulted: 22 1414    Specimen: Urine Updated: 22 1414     Glucose, UA Negative     Protein, POC Negative    POC Urinalysis Dipstick [[id number]] Resulted: 22 1355    Specimen: Urine Updated: 22 1357     Glucose, UA Negative     Protein, POC Negative          I saw the patient with a face mask, gloves and eye protection  The patient herself was masked.  Social distancing was observed as appropriate. All COVID precautions observed.     A:    DIAGNOSES:  36 y.o.  36w3d  Patient Active Problem List   Diagnosis   • History of reversal of tubal ligation   • H/O one miscarriage   • Antepartum multigravida of advanced maternal age- NIPT low risk, AFP neg   • Threatened    • History of gestational hypertension: Baseline 24 hr urine=102    • SMA carrier: Partner testing= FOB neg   • Adnexal mass   • Urinary tract infection in mother during second trimester of pregnancy   • Vaginal discharge   • Cervical funneling   • Fibroids   • Fetal size inconsistent with dates   • Antepartum mild preeclampsia: deliver @ 37 wks   • Pregnancy      Diagnoses and all orders for this visit:    1. Previous  section (Primary)  -     Case Request; Standing  -     COVID PRE-OP / PRE-PROCEDURE SCREENING ORDER (NO ISOLATION) - Swab, Nasopharynx; Future  -     Case Request    2. Antepartum mild preeclampsia  -     Case Request; Standing  -     COVID PRE-OP / PRE-PROCEDURE SCREENING ORDER (NO ISOLATION) - Swab, Nasopharynx; Future  -     Case Request      Pregnancy Assessment : High Risk Pregnancy mild preeclampsia, obesity, AMA  NEW PROBLEMS? GERD    P:  Tests ordered for this or next visit:  TESTING FOR mild preeclampsia and LABS: ua  New Meds:No  No follow-ups on file.    Wilfredo Delcid MD

## 2022-01-06 ENCOUNTER — ANESTHESIA EVENT (OUTPATIENT)
Dept: OBSTETRICS AND GYNECOLOGY | Facility: HOSPITAL | Age: 37
End: 2022-01-06

## 2022-01-07 ENCOUNTER — HOSPITAL ENCOUNTER (INPATIENT)
Facility: HOSPITAL | Age: 37
LOS: 2 days | Discharge: HOME | End: 2022-01-09
Attending: OBSTETRICS & GYNECOLOGY | Admitting: OBSTETRICS & GYNECOLOGY

## 2022-01-07 ENCOUNTER — ANESTHESIA (OUTPATIENT)
Dept: OBSTETRICS AND GYNECOLOGY | Facility: HOSPITAL | Age: 37
End: 2022-01-07

## 2022-01-07 DIAGNOSIS — O14.00 ANTEPARTUM MILD PREECLAMPSIA: ICD-10-CM

## 2022-01-07 DIAGNOSIS — Z98.891 S/P CESAREAN SECTION: Primary | ICD-10-CM

## 2022-01-07 DIAGNOSIS — Z98.891 PREVIOUS CESAREAN SECTION: ICD-10-CM

## 2022-01-07 PROBLEM — O14.03 MILD PREECLAMPSIA, THIRD TRIMESTER: Status: ACTIVE | Noted: 2022-01-07

## 2022-01-07 LAB
ABO GROUP BLD: NORMAL
ABO GROUP BLD: NORMAL
ALBUMIN SERPL-MCNC: 3.4 G/DL (ref 3.5–5.2)
ALBUMIN/GLOB SERPL: 1.1 G/DL
ALP SERPL-CCNC: 296 U/L (ref 39–117)
ALT SERPL W P-5'-P-CCNC: 19 U/L (ref 1–33)
AMPHET+METHAMPHET UR QL: NEGATIVE
AMPHETAMINES UR QL: NEGATIVE
ANION GAP SERPL CALCULATED.3IONS-SCNC: 12.2 MMOL/L (ref 5–15)
AST SERPL-CCNC: 17 U/L (ref 1–32)
BACTERIA UR QL AUTO: ABNORMAL /HPF
BARBITURATES UR QL SCN: NEGATIVE
BENZODIAZ UR QL SCN: NEGATIVE
BILIRUB SERPL-MCNC: 0.2 MG/DL (ref 0–1.2)
BILIRUB UR QL STRIP: NEGATIVE
BLD GP AB SCN SERPL QL: NEGATIVE
BUN SERPL-MCNC: 7 MG/DL (ref 6–20)
BUN/CREAT SERPL: 13 (ref 7–25)
BUPRENORPHINE SERPL-MCNC: NEGATIVE NG/ML
CALCIUM SPEC-SCNC: 9 MG/DL (ref 8.6–10.5)
CANNABINOIDS SERPL QL: NEGATIVE
CHLORIDE SERPL-SCNC: 105 MMOL/L (ref 98–107)
CLARITY UR: ABNORMAL
CO2 SERPL-SCNC: 17.8 MMOL/L (ref 22–29)
COCAINE UR QL: NEGATIVE
COLOR UR: YELLOW
CREAT SERPL-MCNC: 0.54 MG/DL (ref 0.57–1)
DEPRECATED RDW RBC AUTO: 44.7 FL (ref 37–54)
ERYTHROCYTE [DISTWIDTH] IN BLOOD BY AUTOMATED COUNT: 14 % (ref 12.3–15.4)
GFR SERPL CREATININE-BSD FRML MDRD: 128 ML/MIN/1.73
GLOBULIN UR ELPH-MCNC: 3.1 GM/DL
GLUCOSE SERPL-MCNC: 91 MG/DL (ref 65–99)
GLUCOSE UR STRIP-MCNC: NEGATIVE MG/DL
HCT VFR BLD AUTO: 36.8 % (ref 34–46.6)
HGB BLD-MCNC: 11.9 G/DL (ref 12–15.9)
HGB UR QL STRIP.AUTO: ABNORMAL
HYALINE CASTS UR QL AUTO: ABNORMAL /LPF
KETONES UR QL STRIP: NEGATIVE
LEUKOCYTE ESTERASE UR QL STRIP.AUTO: ABNORMAL
MCH RBC QN AUTO: 28.1 PG (ref 26.6–33)
MCHC RBC AUTO-ENTMCNC: 32.3 G/DL (ref 31.5–35.7)
MCV RBC AUTO: 87 FL (ref 79–97)
METHADONE UR QL SCN: NEGATIVE
NITRITE UR QL STRIP: NEGATIVE
OPIATES UR QL: NEGATIVE
OXYCODONE UR QL SCN: NEGATIVE
PCP UR QL SCN: NEGATIVE
PH UR STRIP.AUTO: 6.5 [PH] (ref 4.5–8)
PLATELET # BLD AUTO: 209 10*3/MM3 (ref 140–450)
PMV BLD AUTO: 11.2 FL (ref 6–12)
POTASSIUM SERPL-SCNC: 4.1 MMOL/L (ref 3.5–5.2)
PROPOXYPH UR QL: NEGATIVE
PROT SERPL-MCNC: 6.5 G/DL (ref 6–8.5)
PROT UR QL STRIP: ABNORMAL
RBC # BLD AUTO: 4.23 10*6/MM3 (ref 3.77–5.28)
RBC # UR STRIP: ABNORMAL /HPF
REF LAB TEST METHOD: ABNORMAL
RH BLD: POSITIVE
RH BLD: POSITIVE
SARS-COV-2 RNA PNL SPEC NAA+PROBE: NOT DETECTED
SODIUM SERPL-SCNC: 135 MMOL/L (ref 136–145)
SP GR UR STRIP: 1.03 (ref 1–1.03)
SQUAMOUS #/AREA URNS HPF: ABNORMAL /HPF
T&S EXPIRATION DATE: NORMAL
TRICYCLICS UR QL SCN: NEGATIVE
URATE SERPL-MCNC: 4.4 MG/DL (ref 2.4–5.7)
UROBILINOGEN UR QL STRIP: ABNORMAL
WBC # UR STRIP: ABNORMAL /HPF
WBC NRBC COR # BLD: 8.34 10*3/MM3 (ref 3.4–10.8)

## 2022-01-07 PROCEDURE — 80306 DRUG TEST PRSMV INSTRMNT: CPT | Performed by: OBSTETRICS & GYNECOLOGY

## 2022-01-07 PROCEDURE — 25010000002 ONDANSETRON PER 1 MG: Performed by: NURSE ANESTHETIST, CERTIFIED REGISTERED

## 2022-01-07 PROCEDURE — 0 MAGNESIUM SULFATE 20 GM/500ML SOLUTION: Performed by: OBSTETRICS & GYNECOLOGY

## 2022-01-07 PROCEDURE — 0 CEFAZOLIN PER 500 MG: Performed by: OBSTETRICS & GYNECOLOGY

## 2022-01-07 PROCEDURE — 25010000002 AZITHROMYCIN PER 500 MG: Performed by: OBSTETRICS & GYNECOLOGY

## 2022-01-07 PROCEDURE — 86900 BLOOD TYPING SEROLOGIC ABO: CPT

## 2022-01-07 PROCEDURE — 25010000002 PHENYLEPHRINE 10 MG/ML SOLUTION: Performed by: NURSE ANESTHETIST, CERTIFIED REGISTERED

## 2022-01-07 PROCEDURE — 59515 CESAREAN DELIVERY: CPT | Performed by: OBSTETRICS & GYNECOLOGY

## 2022-01-07 PROCEDURE — 81001 URINALYSIS AUTO W/SCOPE: CPT | Performed by: OBSTETRICS & GYNECOLOGY

## 2022-01-07 PROCEDURE — 94799 UNLISTED PULMONARY SVC/PX: CPT

## 2022-01-07 PROCEDURE — 59514 CESAREAN DELIVERY ONLY: CPT | Performed by: SPECIALIST/TECHNOLOGIST, OTHER

## 2022-01-07 PROCEDURE — 86901 BLOOD TYPING SEROLOGIC RH(D): CPT | Performed by: OBSTETRICS & GYNECOLOGY

## 2022-01-07 PROCEDURE — 25010000002 DIPHENHYDRAMINE PER 50 MG: Performed by: NURSE ANESTHETIST, CERTIFIED REGISTERED

## 2022-01-07 PROCEDURE — 86901 BLOOD TYPING SEROLOGIC RH(D): CPT

## 2022-01-07 PROCEDURE — 25010000002 KETOROLAC TROMETHAMINE PER 15 MG: Performed by: OBSTETRICS & GYNECOLOGY

## 2022-01-07 PROCEDURE — 25010000002 MORPHINE PER 10 MG: Performed by: NURSE ANESTHETIST, CERTIFIED REGISTERED

## 2022-01-07 PROCEDURE — 94761 N-INVAS EAR/PLS OXIMETRY MLT: CPT

## 2022-01-07 PROCEDURE — 85027 COMPLETE CBC AUTOMATED: CPT | Performed by: OBSTETRICS & GYNECOLOGY

## 2022-01-07 PROCEDURE — 84550 ASSAY OF BLOOD/URIC ACID: CPT | Performed by: OBSTETRICS & GYNECOLOGY

## 2022-01-07 PROCEDURE — 88307 TISSUE EXAM BY PATHOLOGIST: CPT

## 2022-01-07 PROCEDURE — 80053 COMPREHEN METABOLIC PANEL: CPT | Performed by: OBSTETRICS & GYNECOLOGY

## 2022-01-07 PROCEDURE — 87635 SARS-COV-2 COVID-19 AMP PRB: CPT | Performed by: OBSTETRICS & GYNECOLOGY

## 2022-01-07 PROCEDURE — 25010000002 KETOROLAC TROMETHAMINE PER 15 MG: Performed by: NURSE ANESTHETIST, CERTIFIED REGISTERED

## 2022-01-07 PROCEDURE — 86850 RBC ANTIBODY SCREEN: CPT | Performed by: OBSTETRICS & GYNECOLOGY

## 2022-01-07 PROCEDURE — 86900 BLOOD TYPING SEROLOGIC ABO: CPT | Performed by: OBSTETRICS & GYNECOLOGY

## 2022-01-07 RX ORDER — FAMOTIDINE 10 MG/ML
INJECTION, SOLUTION INTRAVENOUS
Status: DISPENSED
Start: 2022-01-07 | End: 2022-01-07

## 2022-01-07 RX ORDER — MAGNESIUM SULFATE HEPTAHYDRATE 40 MG/ML
2 INJECTION, SOLUTION INTRAVENOUS CONTINUOUS
Status: DISCONTINUED | OUTPATIENT
Start: 2022-01-07 | End: 2022-01-09 | Stop reason: HOSPADM

## 2022-01-07 RX ORDER — SODIUM CHLORIDE, SODIUM LACTATE, POTASSIUM CHLORIDE, CALCIUM CHLORIDE 600; 310; 30; 20 MG/100ML; MG/100ML; MG/100ML; MG/100ML
75 INJECTION, SOLUTION INTRAVENOUS CONTINUOUS
Status: DISCONTINUED | OUTPATIENT
Start: 2022-01-07 | End: 2022-01-09 | Stop reason: HOSPADM

## 2022-01-07 RX ORDER — ONDANSETRON 2 MG/ML
4 INJECTION INTRAMUSCULAR; INTRAVENOUS ONCE AS NEEDED
Status: DISCONTINUED | OUTPATIENT
Start: 2022-01-07 | End: 2022-01-09 | Stop reason: HOSPADM

## 2022-01-07 RX ORDER — OXYCODONE HYDROCHLORIDE AND ACETAMINOPHEN 5; 325 MG/1; MG/1
1 TABLET ORAL EVERY 4 HOURS PRN
Status: DISCONTINUED | OUTPATIENT
Start: 2022-01-07 | End: 2022-01-09 | Stop reason: HOSPADM

## 2022-01-07 RX ORDER — SODIUM CHLORIDE, SODIUM LACTATE, POTASSIUM CHLORIDE, CALCIUM CHLORIDE 600; 310; 30; 20 MG/100ML; MG/100ML; MG/100ML; MG/100ML
INJECTION, SOLUTION INTRAVENOUS CONTINUOUS PRN
Status: DISCONTINUED | OUTPATIENT
Start: 2022-01-07 | End: 2022-01-07 | Stop reason: SURG

## 2022-01-07 RX ORDER — OXYTOCIN/0.9 % SODIUM CHLORIDE 30/500 ML
650 PLASTIC BAG, INJECTION (ML) INTRAVENOUS ONCE
Status: DISCONTINUED | OUTPATIENT
Start: 2022-01-07 | End: 2022-01-09 | Stop reason: HOSPADM

## 2022-01-07 RX ORDER — DIPHENHYDRAMINE HCL 25 MG
25 CAPSULE ORAL EVERY 4 HOURS PRN
Status: DISCONTINUED | OUTPATIENT
Start: 2022-01-07 | End: 2022-01-09 | Stop reason: HOSPADM

## 2022-01-07 RX ORDER — MISOPROSTOL 200 UG/1
800 TABLET ORAL AS NEEDED
Status: DISCONTINUED | OUTPATIENT
Start: 2022-01-07 | End: 2022-01-07

## 2022-01-07 RX ORDER — DIPHENHYDRAMINE HYDROCHLORIDE 50 MG/ML
INJECTION INTRAMUSCULAR; INTRAVENOUS AS NEEDED
Status: DISCONTINUED | OUTPATIENT
Start: 2022-01-07 | End: 2022-01-07 | Stop reason: SURG

## 2022-01-07 RX ORDER — ONDANSETRON 4 MG/1
4 TABLET, FILM COATED ORAL EVERY 8 HOURS PRN
Status: DISCONTINUED | OUTPATIENT
Start: 2022-01-07 | End: 2022-01-09 | Stop reason: HOSPADM

## 2022-01-07 RX ORDER — MORPHINE SULFATE 1 MG/ML
INJECTION, SOLUTION EPIDURAL; INTRATHECAL; INTRAVENOUS AS NEEDED
Status: DISCONTINUED | OUTPATIENT
Start: 2022-01-07 | End: 2022-01-07 | Stop reason: SURG

## 2022-01-07 RX ORDER — KETOROLAC TROMETHAMINE 30 MG/ML
INJECTION, SOLUTION INTRAMUSCULAR; INTRAVENOUS AS NEEDED
Status: DISCONTINUED | OUTPATIENT
Start: 2022-01-07 | End: 2022-01-07 | Stop reason: SURG

## 2022-01-07 RX ORDER — FERROUS GLUCONATE 324(37.5)
324 TABLET ORAL
Status: DISCONTINUED | OUTPATIENT
Start: 2022-01-07 | End: 2022-01-09 | Stop reason: HOSPADM

## 2022-01-07 RX ORDER — CARBOPROST TROMETHAMINE 250 UG/ML
250 INJECTION, SOLUTION INTRAMUSCULAR AS NEEDED
Status: DISCONTINUED | OUTPATIENT
Start: 2022-01-07 | End: 2022-01-07

## 2022-01-07 RX ORDER — MISOPROSTOL 200 UG/1
600 TABLET ORAL ONCE AS NEEDED
Status: DISCONTINUED | OUTPATIENT
Start: 2022-01-07 | End: 2022-01-09 | Stop reason: HOSPADM

## 2022-01-07 RX ORDER — SIMETHICONE 80 MG
80 TABLET,CHEWABLE ORAL 4 TIMES DAILY PRN
Status: DISCONTINUED | OUTPATIENT
Start: 2022-01-07 | End: 2022-01-09 | Stop reason: HOSPADM

## 2022-01-07 RX ORDER — HYDROMORPHONE HCL 110MG/55ML
0.25 PATIENT CONTROLLED ANALGESIA SYRINGE INTRAVENOUS
Status: ACTIVE | OUTPATIENT
Start: 2022-01-07 | End: 2022-01-08

## 2022-01-07 RX ORDER — HYDROCODONE BITARTRATE AND ACETAMINOPHEN 5; 325 MG/1; MG/1
1 TABLET ORAL EVERY 4 HOURS PRN
Status: ACTIVE | OUTPATIENT
Start: 2022-01-07 | End: 2022-01-08

## 2022-01-07 RX ORDER — PHENYLEPHRINE HYDROCHLORIDE 10 MG/ML
INJECTION INTRAVENOUS AS NEEDED
Status: DISCONTINUED | OUTPATIENT
Start: 2022-01-07 | End: 2022-01-07 | Stop reason: SURG

## 2022-01-07 RX ORDER — OXYTOCIN/0.9 % SODIUM CHLORIDE 30/500 ML
PLASTIC BAG, INJECTION (ML) INTRAVENOUS
Status: COMPLETED
Start: 2022-01-07 | End: 2022-01-07

## 2022-01-07 RX ORDER — PRENATAL VIT/IRON FUM/FOLIC AC 27MG-0.8MG
1 TABLET ORAL DAILY
Status: DISCONTINUED | OUTPATIENT
Start: 2022-01-07 | End: 2022-01-09 | Stop reason: HOSPADM

## 2022-01-07 RX ORDER — OXYCODONE HYDROCHLORIDE AND ACETAMINOPHEN 5; 325 MG/1; MG/1
2 TABLET ORAL EVERY 4 HOURS PRN
Status: DISCONTINUED | OUTPATIENT
Start: 2022-01-07 | End: 2022-01-09 | Stop reason: HOSPADM

## 2022-01-07 RX ORDER — METHYLERGONOVINE MALEATE 0.2 MG/ML
200 INJECTION INTRAVENOUS ONCE AS NEEDED
Status: DISCONTINUED | OUTPATIENT
Start: 2022-01-07 | End: 2022-01-07

## 2022-01-07 RX ORDER — BUPIVACAINE HYDROCHLORIDE 7.5 MG/ML
INJECTION, SOLUTION EPIDURAL; RETROBULBAR AS NEEDED
Status: DISCONTINUED | OUTPATIENT
Start: 2022-01-07 | End: 2022-01-07 | Stop reason: SURG

## 2022-01-07 RX ORDER — IBUPROFEN 800 MG/1
800 TABLET ORAL EVERY 8 HOURS PRN
Status: DISCONTINUED | OUTPATIENT
Start: 2022-01-07 | End: 2022-01-09 | Stop reason: HOSPADM

## 2022-01-07 RX ORDER — SCOLOPAMINE TRANSDERMAL SYSTEM 1 MG/1
PATCH, EXTENDED RELEASE TRANSDERMAL AS NEEDED
Status: DISCONTINUED | OUTPATIENT
Start: 2022-01-07 | End: 2022-01-07 | Stop reason: SURG

## 2022-01-07 RX ORDER — FAMOTIDINE 10 MG/ML
INJECTION, SOLUTION INTRAVENOUS AS NEEDED
Status: DISCONTINUED | OUTPATIENT
Start: 2022-01-07 | End: 2022-01-07 | Stop reason: SURG

## 2022-01-07 RX ORDER — LIDOCAINE HYDROCHLORIDE 10 MG/ML
5 INJECTION, SOLUTION EPIDURAL; INFILTRATION; INTRACAUDAL; PERINEURAL AS NEEDED
Status: DISCONTINUED | OUTPATIENT
Start: 2022-01-07 | End: 2022-01-07

## 2022-01-07 RX ORDER — KETOROLAC TROMETHAMINE 30 MG/ML
30 INJECTION, SOLUTION INTRAMUSCULAR; INTRAVENOUS EVERY 6 HOURS
Status: COMPLETED | OUTPATIENT
Start: 2022-01-07 | End: 2022-01-08

## 2022-01-07 RX ORDER — SODIUM CHLORIDE 0.9 % (FLUSH) 0.9 %
1-10 SYRINGE (ML) INJECTION AS NEEDED
Status: DISCONTINUED | OUTPATIENT
Start: 2022-01-07 | End: 2022-01-07

## 2022-01-07 RX ORDER — DIPHENHYDRAMINE HYDROCHLORIDE 50 MG/ML
25 INJECTION INTRAMUSCULAR; INTRAVENOUS EVERY 4 HOURS PRN
Status: DISCONTINUED | OUTPATIENT
Start: 2022-01-07 | End: 2022-01-09 | Stop reason: HOSPADM

## 2022-01-07 RX ORDER — CEFAZOLIN SODIUM IN 0.9 % NACL 3 G/100 ML
3 INTRAVENOUS SOLUTION, PIGGYBACK (ML) INTRAVENOUS ONCE
Status: COMPLETED | OUTPATIENT
Start: 2022-01-07 | End: 2022-01-07

## 2022-01-07 RX ORDER — DOCUSATE SODIUM 100 MG/1
100 CAPSULE, LIQUID FILLED ORAL 2 TIMES DAILY PRN
Status: DISCONTINUED | OUTPATIENT
Start: 2022-01-07 | End: 2022-01-09 | Stop reason: HOSPADM

## 2022-01-07 RX ORDER — OXYTOCIN/0.9 % SODIUM CHLORIDE 30/500 ML
PLASTIC BAG, INJECTION (ML) INTRAVENOUS CONTINUOUS PRN
Status: DISCONTINUED | OUTPATIENT
Start: 2022-01-07 | End: 2022-01-07 | Stop reason: SURG

## 2022-01-07 RX ORDER — OXYTOCIN/0.9 % SODIUM CHLORIDE 30/500 ML
85 PLASTIC BAG, INJECTION (ML) INTRAVENOUS ONCE
Status: COMPLETED | OUTPATIENT
Start: 2022-01-07 | End: 2022-01-07

## 2022-01-07 RX ORDER — ONDANSETRON 2 MG/ML
INJECTION INTRAMUSCULAR; INTRAVENOUS AS NEEDED
Status: DISCONTINUED | OUTPATIENT
Start: 2022-01-07 | End: 2022-01-07 | Stop reason: SURG

## 2022-01-07 RX ORDER — SODIUM CHLORIDE 0.9 % (FLUSH) 0.9 %
10 SYRINGE (ML) INJECTION EVERY 12 HOURS SCHEDULED
Status: DISCONTINUED | OUTPATIENT
Start: 2022-01-07 | End: 2022-01-07

## 2022-01-07 RX ADMIN — SCOPALAMINE 1 PATCH: 1 PATCH, EXTENDED RELEASE TRANSDERMAL at 07:29

## 2022-01-07 RX ADMIN — Medication 324 MG: at 15:05

## 2022-01-07 RX ADMIN — KETOROLAC TROMETHAMINE 30 MG: 30 INJECTION, SOLUTION INTRAMUSCULAR; INTRAVENOUS at 15:07

## 2022-01-07 RX ADMIN — KETOROLAC TROMETHAMINE 30 MG: 30 INJECTION, SOLUTION INTRAMUSCULAR; INTRAVENOUS at 21:13

## 2022-01-07 RX ADMIN — PHENYLEPHRINE HYDROCHLORIDE 100 MCG: 10 INJECTION INTRAVENOUS at 08:16

## 2022-01-07 RX ADMIN — OXYTOCIN-SODIUM CHLORIDE 0.9% IV SOLN 30 UNIT/500ML 85 ML/HR: 30-0.9/5 SOLUTION at 09:55

## 2022-01-07 RX ADMIN — OXYTOCIN-SODIUM CHLORIDE 0.9% IV SOLN 30 UNIT/500ML 650 ML/HR: 30-0.9/5 SOLUTION at 08:29

## 2022-01-07 RX ADMIN — MORPHINE SULFATE 0.2 MG: 1 INJECTION, SOLUTION EPIDURAL; INTRATHECAL; INTRAVENOUS at 08:08

## 2022-01-07 RX ADMIN — MAGNESIUM SULFATE IN WATER 2 G/HR: 40 INJECTION, SOLUTION INTRAVENOUS at 23:12

## 2022-01-07 RX ADMIN — BUPIVACAINE HYDROCHLORIDE 1.5 ML: 7.5 INJECTION, SOLUTION EPIDURAL; RETROBULBAR at 08:08

## 2022-01-07 RX ADMIN — SODIUM CHLORIDE, POTASSIUM CHLORIDE, SODIUM LACTATE AND CALCIUM CHLORIDE: 600; 310; 30; 20 INJECTION, SOLUTION INTRAVENOUS at 07:58

## 2022-01-07 RX ADMIN — PHENYLEPHRINE HYDROCHLORIDE 100 MCG: 10 INJECTION INTRAVENOUS at 08:32

## 2022-01-07 RX ADMIN — PHENYLEPHRINE HYDROCHLORIDE 200 MCG: 10 INJECTION INTRAVENOUS at 08:19

## 2022-01-07 RX ADMIN — SODIUM CHLORIDE 500 MG: 900 INJECTION, SOLUTION INTRAVENOUS at 08:18

## 2022-01-07 RX ADMIN — PHENYLEPHRINE HYDROCHLORIDE 200 MCG: 10 INJECTION INTRAVENOUS at 08:42

## 2022-01-07 RX ADMIN — FAMOTIDINE 20 MG: 10 INJECTION, SOLUTION INTRAVENOUS at 07:29

## 2022-01-07 RX ADMIN — PHENYLEPHRINE HYDROCHLORIDE 100 MCG: 10 INJECTION INTRAVENOUS at 08:24

## 2022-01-07 RX ADMIN — DOCUSATE SODIUM 100 MG: 100 CAPSULE, LIQUID FILLED ORAL at 15:06

## 2022-01-07 RX ADMIN — DIPHENHYDRAMINE HYDROCHLORIDE 12.5 MG: 50 INJECTION, SOLUTION INTRAMUSCULAR; INTRAVENOUS at 07:29

## 2022-01-07 RX ADMIN — ONDANSETRON 4 MG: 2 INJECTION INTRAMUSCULAR; INTRAVENOUS at 07:29

## 2022-01-07 RX ADMIN — KETOROLAC TROMETHAMINE 30 MG: 30 INJECTION, SOLUTION INTRAMUSCULAR; INTRAVENOUS at 08:42

## 2022-01-07 RX ADMIN — PHENYLEPHRINE HYDROCHLORIDE 100 MCG: 10 INJECTION INTRAVENOUS at 08:09

## 2022-01-07 RX ADMIN — SODIUM CHLORIDE, POTASSIUM CHLORIDE, SODIUM LACTATE AND CALCIUM CHLORIDE 1000 ML: 600; 310; 30; 20 INJECTION, SOLUTION INTRAVENOUS at 06:49

## 2022-01-07 RX ADMIN — Medication 3 G: at 08:12

## 2022-01-07 RX ADMIN — PRENATAL VIT W/ FE FUMARATE-FA TAB 27-0.8 MG 1 TABLET: 27-0.8 TAB at 15:06

## 2022-01-07 RX ADMIN — PHENYLEPHRINE HYDROCHLORIDE 100 MCG: 10 INJECTION INTRAVENOUS at 08:27

## 2022-01-07 NOTE — NURSING NOTE
Spoke with Dr. Fitzgerald regarding magnesium sulfate orders. She stated once pitocin is finished start mag @ 4g loading dose, 2g continuous with total fluid volume at 125mL/hr. Clear fluids now, advance diet as tolerated. Call if b/p >160/>100.

## 2022-01-07 NOTE — H&P
Patient Care Team:  Paris Maldonado APRN as PCP - General (Family Medicine)  Roxana Shen APRN as Nurse Navigator    Chief complaint previous C/S, mild preeclampsia       HPI: Ms. Nagy is a femi 36-year-old -0-1-2 with intrauterine pregnancy at 37.0 weeks who is here for repeat  due to mild preeclampsia at term.  Her prenatal care has been complicated by mild preeclampsia.  She is on labetalol 100 mg p.o. twice daily.  Her platelets and LFTs are normal today.  She has had 1  and 2 vaginal deliveries and declines .  She is mildly anemic with admission hemoglobin of 11.9.  She is AMA and her NIPT and AFP were both normal.  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  This pregnancy is the result of a tubal reversal and she plans on using an IUD postpartum.      PMH:   Past Medical History:   Diagnosis Date   • Anxiety    • Depression    • Gestational hypertension          PSH:   Past Surgical History:   Procedure Laterality Date   •  SECTION     • CHOLECYSTECTOMY     • TUBAL ABDOMINAL LIGATION      and reversal       SoHx:   Social History     Socioeconomic History   • Marital status:    Tobacco Use   • Smoking status: Never Smoker   • Smokeless tobacco: Never Used   Vaping Use   • Vaping Use: Never used   Substance and Sexual Activity   • Alcohol use: Never   • Drug use: Never   • Sexual activity: Yes     Partners: Male       FHx:   Family History   Problem Relation Age of Onset   • Depression Mother    • Anxiety disorder Mother    • Diabetes Father    • Hypertension Father    • Heart disease Father        PGyn Hx:   No abnormal paps  No STDs    POBHx:   2   1 C/S elevated BP    Allergies: Patient has no known allergies.    Medications:   No current facility-administered medications on file prior to encounter.     Current Outpatient Medications on File Prior to Encounter   Medication Sig Dispense Refill   • ferrous gluconate  (FERGON) 324 MG tablet Take 1 tablet by mouth Daily With Breakfast. 30 tablet 6   • labetalol (NORMODYNE) 100 MG tablet Take 1 tablet by mouth 2 (Two) Times a Day. 60 tablet 6   • Prenatal Vit-Fe Fumarate-FA (Prenatal Vitamin) 27-0.8 MG tablet Take  by mouth.         /90   Pulse 68   Wt 111 kg (245 lb)   LMP 04/23/2021 (Exact Date)   Breastfeeding Yes   BMI 46.29 kg/m²     Review of Systems   Constitutional: Positive for activity change. Negative for appetite change, fatigue, fever and unexpected weight change.   Eyes: Negative for photophobia and visual disturbance.   Respiratory: Negative for cough and shortness of breath.    Cardiovascular: Negative for chest pain and palpitations.   Gastrointestinal: Negative for abdominal distention, abdominal pain, constipation, diarrhea and nausea.   Endocrine: Negative for cold intolerance and heat intolerance.   Genitourinary: Negative for dyspareunia, dysuria, menstrual problem, pelvic pain and vaginal discharge.   Musculoskeletal: Negative for back pain.   Skin: Negative for color change and rash.   Neurological: Negative for headaches.   Hematological: Negative for adenopathy. Does not bruise/bleed easily.   Psychiatric/Behavioral: Negative for dysphoric mood. The patient is nervous/anxious.        Physical Exam  Vitals and nursing note reviewed.   Constitutional:       Appearance: Normal appearance. She is well-developed. She is obese.   HENT:      Head: Normocephalic and atraumatic.   Eyes:      General: No scleral icterus.     Conjunctiva/sclera: Conjunctivae normal.   Neck:      Thyroid: No thyromegaly.   Cardiovascular:      Rate and Rhythm: Normal rate and regular rhythm.   Pulmonary:      Effort: Pulmonary effort is normal.      Breath sounds: Normal breath sounds.   Abdominal:      General: There is no distension.      Palpations: Abdomen is soft. There is no mass.      Tenderness: There is no abdominal tenderness. There is no guarding or rebound.       Hernia: No hernia is present.   Skin:     General: Skin is warm and dry.   Neurological:      Mental Status: She is alert and oriented to person, place, and time.   Psychiatric:         Mood and Affect: Mood normal.         Behavior: Behavior normal.         Thought Content: Thought content normal.         Judgment: Judgment normal.         Debilities/Disabilities Identified: None    Labs:     Lab Results (last 7 days)     Procedure Component Value Units Date/Time    COVID PRE-OP / PRE-PROCEDURE SCREENING ORDER (NO ISOLATION) - Swab, Nasal Cavity [[id number]]  (Normal) Collected: 01/07/22 0636    Specimen: Swab from Nasal Cavity Updated: 01/07/22 0719    Narrative:      The following orders were created for panel order COVID PRE-OP / PRE-PROCEDURE SCREENING ORDER (NO ISOLATION) - Swab, Nasal Cavity.  Procedure                               Abnormality         Status                     ---------                               -----------         ------                     COVID-19,[facility]...[[id number]]  Normal              Final result                 Please view results for these tests on the individual orders.    COVID-19,[facility] IN-HOUSE,Nasal Swab No Transport Media 3-4 HR TAT - Swab, Nasal Cavity [[id number]]  (Normal) Collected: 01/07/22 0636    Specimen: Swab from Nasal Cavity Updated: 01/07/22 0719     COVID19 Not Detected    Narrative:      Fact sheet for providers: https://www.fda.gov/media/573413/download     Fact sheet for patients: https://www.fda.gov/media/444286/download    Test performed by PCR.    Consider negative results in combination with clinical observations, patient history, and epidemiological information.    Urinalysis, Microscopic Only - Urine, Clean Catch [[id number]]  (Abnormal) Collected: 01/07/22 0636    Specimen: Urine, Clean Catch Updated: 01/07/22 0717     RBC, UA 21-30 /HPF      WBC, UA 6-12 /HPF      Bacteria, UA None Seen /HPF      Squamous Epithelial Cells, UA 7-12 /HPF       Hyaline Casts, UA None Seen /LPF      Methodology Manual Light Microscopy    Comprehensive Metabolic Panel [[id number]]  (Abnormal) Collected: 01/07/22 0636    Specimen: Blood Updated: 01/07/22 0704     Glucose 91 mg/dL      BUN 7 mg/dL      Creatinine 0.54 mg/dL      Sodium 135 mmol/L      Potassium 4.1 mmol/L      Chloride 105 mmol/L      CO2 17.8 mmol/L      Calcium 9.0 mg/dL      Total Protein 6.5 g/dL      Albumin 3.40 g/dL      ALT (SGPT) 19 U/L      AST (SGOT) 17 U/L      Alkaline Phosphatase 296 U/L      Total Bilirubin 0.2 mg/dL      eGFR Non African Amer 128 mL/min/1.73      Globulin 3.1 gm/dL      A/G Ratio 1.1 g/dL      BUN/Creatinine Ratio 13.0     Anion Gap 12.2 mmol/L     Narrative:      GFR Normal >60  Chronic Kidney Disease <60  Kidney Failure <15      Uric Acid [[id number]]  (Normal) Collected: 01/07/22 0636    Specimen: Blood Updated: 01/07/22 0704     Uric Acid 4.4 mg/dL     Urine Drug Screen - Urine, Clean Catch [[id number]]  (Normal) Collected: 01/07/22 0636    Specimen: Urine, Clean Catch Updated: 01/07/22 0703     THC, Screen, Urine Negative     Phencyclidine (PCP), Urine Negative     Cocaine Screen, Urine Negative     Methamphetamine, Ur Negative     Opiate Screen Negative     Amphetamine Screen, Urine Negative     Benzodiazepine Screen, Urine Negative     Tricyclic Antidepressants Screen Negative     Methadone Screen, Urine Negative     Barbiturates Screen, Urine Negative     Oxycodone Screen, Urine Negative     Propoxyphene Screen Negative     Buprenorphine, Screen, Urine Negative    Narrative:      Urine drug screen results are to be used for medical purposes only.  They are not to be used for legal purposes such as employment testing.  Negative results do not necessarily mean the complete absence of a subtance, but rather that the result is less than the cutoff for that substance.  Positive results are unconfirmed and considered Preliminary Positive.  [facility] does not  automatically confirm Postitive Unconfirmed results.  The physician may request (order) an Unconfirmed Positive result to be sent out for confirmation.      Negative Thresholds for Drugs Screened:    THC screen, urine                          50 ng/ml  Phenycyclidine (PCP), urine                25 ng/ml  Cocaine screen, urine                     150 ng/ml  Methamphetamine, urine                    500 ng/ml  Opiate screen, urine                      100 ng/ml  Amphetamine screen, urine                 500 ng/ml  Benzodiazepine screen, urine              150 ng/ml  Tricyclic Antidepressants screen, urine   300 ng/ml  Methadone screen, urine                   200 ng/ml  Barbiturates screen, urine                200 ng/ml  Oxycodone screen, urine                   100 ng/ml  Propoxyphene screen, urine                300 ng/ml  Buprenorphine screen, urine                10 ng/ml    Urinalysis With Microscopic If Indicated (No Culture) - Urine, Clean Catch [[id number]]  (Abnormal) Collected: 22    Specimen: Urine, Clean Catch Updated: 2255     Color, UA Yellow     Appearance, UA Slightly Cloudy     pH, UA 6.5     Specific [place], UA 1.033     Comment: Result obtained by Refractometer        Glucose, UA Negative     Ketones, UA Negative     Bilirubin, UA Negative     Blood, UA Large (3+)     Protein,  mg/dL (2+)     Leuk Esterase, UA Small (1+)     Nitrite, UA Negative     Urobilinogen, UA 0.2 E.U./dL    CBC (No Diff) [[id number]]  (Abnormal) Collected: 22    Specimen: Blood Updated: 2251     WBC 8.34 10*3/mm3      RBC 4.23 10*6/mm3      Hemoglobin 11.9 g/dL      Hematocrit 36.8 %      MCV 87.0 fL      MCH 28.1 pg      MCHC 32.3 g/dL      RDW 14.0 %      RDW-SD 44.7 fl      MPV 11.2 fL      Platelets 209 10*3/mm3           Assessment/Plan:   1. 37 yo  with IUP @ 37.0 weeks with mild preeclampsia undergoing a RLTCS. Risks, benefits and alternatives of the procedure were  discussed, including , but not limited to: infection, bleeding, transfusion, injury to adjacent structures, laparotomy, possible nondiagnostic findings, possible findings of unexpected malignancy, reoperation, recurrent symptoms, thromboembolic events, anaeasthetic complications and death. Pre/intra/postop course was reviewed and all questions answered.           I discussed the patients findings and my recommendations with patient, family and nursing staff.     Paris Fitzgerald MD  01/07/22  07:26 EST

## 2022-01-07 NOTE — PLAN OF CARE
Problem: Adult Inpatient Plan of Care  Goal: Plan of Care Review  Outcome: Ongoing, Progressing  Flowsheets (Taken 1/7/2022 1742)  Progress: improving  Plan of Care Reviewed With:   patient   spouse  Outcome Summary: vss, lochia wdl, pain controlled, urine output wdl, bonding well with infant, magnesium sulfate started  Goal: Patient-Specific Goal (Individualized)  Outcome: Ongoing, Progressing  Goal: Absence of Hospital-Acquired Illness or Injury  Outcome: Ongoing, Progressing  Intervention: Identify and Manage Fall Risk  Recent Flowsheet Documentation  Taken 1/7/2022 1700 by Sheridan Roman RN  Safety Promotion/Fall Prevention: safety round/check completed  Taken 1/7/2022 1545 by Sheridan Roman RN  Safety Promotion/Fall Prevention: safety round/check completed  Taken 1/7/2022 1345 by Sheridan Roman RN  Safety Promotion/Fall Prevention: safety round/check completed  Taken 1/7/2022 1145 by Sheridan Roman RN  Safety Promotion/Fall Prevention: safety round/check completed  Taken 1/7/2022 1031 by Sheridan Roman RN  Safety Promotion/Fall Prevention: safety round/check completed  Taken 1/7/2022 0934 by Sheridan Roman RN  Safety Promotion/Fall Prevention: safety round/check completed  Taken 1/7/2022 0730 by Sheridan Roman RN  Safety Promotion/Fall Prevention:   safety round/check completed   nonskid shoes/slippers when out of bed  Intervention: Prevent and Manage VTE (venous thromboembolism) Risk  Recent Flowsheet Documentation  Taken 1/7/2022 0934 by Sheridan Roman RN  VTE Prevention/Management:   bilateral   sequential compression devices on  Taken 1/7/2022 0915 by Sheridan Roman RN  VTE Prevention/Management:   bilateral   sequential compression devices on  Goal: Optimal Comfort and Wellbeing  Outcome: Ongoing, Progressing  Intervention: Provide Person-Centered Care  Recent Flowsheet Documentation  Taken 1/7/2022 0934 by Sheridan Roman RN  Trust Relationship/Rapport:   care explained   choices provided    reassurance provided   thoughts/feelings acknowledged   emotional support provided   questions answered   questions encouraged   empathic listening provided  Taken 2022 0730 by Sheridan Roman, RN  Trust Relationship/Rapport:   care explained   emotional support provided   choices provided   reassurance provided   thoughts/feelings acknowledged  Goal: Readiness for Transition of Care  Outcome: Ongoing, Progressing     Problem: Adjustment to Role Transition (Postpartum  Delivery)  Goal: Successful Maternal Role Transition  Outcome: Ongoing, Progressing     Problem: Bleeding (Postpartum  Delivery)  Goal: Hemostasis  Outcome: Ongoing, Progressing     Problem: Infection (Postpartum  Delivery)  Goal: Absence of Infection Signs and Symptoms  Outcome: Ongoing, Progressing     Problem: Pain (Postpartum  Delivery)  Goal: Acceptable Pain Control  Outcome: Ongoing, Progressing     Problem: Postoperative Nausea and Vomiting (Postpartum  Delivery)  Goal: Nausea and Vomiting Relief  Outcome: Ongoing, Progressing     Problem: Postoperative Urinary Retention (Postpartum  Delivery)  Goal: Effective Urinary Elimination  Outcome: Ongoing, Progressing   Goal Outcome Evaluation:  Plan of Care Reviewed With: patient, spouse        Progress: improving  Outcome Summary: vss, lochia wdl, pain controlled, urine output wdl, bonding well with infant, magnesium sulfate started

## 2022-01-07 NOTE — ANESTHESIA PREPROCEDURE EVALUATION
Anesthesia Evaluation     Patient summary reviewed and Nursing notes reviewed   no history of anesthetic complications:  NPO Solid Status: > 8 hours  NPO Liquid Status: > 8 hours           Airway   Mallampati: II  TM distance: >3 FB  Neck ROM: full  Possible difficult intubation  Dental      Pulmonary - negative pulmonary ROS    breath sounds clear to auscultation  Cardiovascular   Exercise tolerance: good (4-7 METS)    Rhythm: regular  Rate: normal    (+) hypertension,       Neuro/Psych  (+) headaches, dizziness/light headedness, psychiatric history Anxiety and Depression,     GI/Hepatic/Renal/Endo    (+)  GERD,  diabetes mellitus (with previous pregnancy ) gestational,     Musculoskeletal (-) negative ROS    Abdominal   (+) obese,    Substance History - negative use     OB/GYN    (+) Pregnant, Preeclampsia, pregnancy induced hypertension        Other - negative ROS                       Anesthesia Plan    ASA 2     spinal and ITN       Anesthetic plan, all risks, benefits, and alternatives have been provided, discussed and informed consent has been obtained with: patient.  Use of blood products discussed with patient  Consented to blood products.

## 2022-01-07 NOTE — OP NOTE
[facility]   Section Operative Note    Pre-Operative Dx:   1) 36 y.o.   2) IUP at 37.0  3) Indications for  section: mild preeclampsia  4) previous C/S, declines   5) AMA  6) Iron deficiency anemia         Postoperative dx:     Same, plus:  7) S/P RLTCS     Procedure: , Low Transverse    Surgeon: Paris Fitzgerald     Assistant: Jenelle Ling CFA   Anesthesia: Spinal    EBL:   mls.  600 mls.         IV Fluids: 1200 mls.   UOP: 75 mls clear urine at the end of the procedure    I/O this shift:  In: 1250 [I.V.:1000; IV Piggyback:250]  Out: 675 [Urine:75; Blood:600]   Antibiotics: cefazolin (Ancef) and Zithromax     Infant:      Time of Delivery : 8:28 am    Gender: female  infant    Weight: 6.1 #    Apgars:   8@ 1 minute /       9@ 5 minutes      Meconium:   Nuchal Cord:    no  no            Indication for C/Section:   mild preeclampsia, previous C/S                                    Procedure Details:   Once all questions were answered, the patient was given IV antibiotics for ID prophylaxis. Pt was taken to the OR where spinal anesthesia was administered. A florian catheter was placed and hung to gravity for the duration of the procedure. SCD's were placed on the lower extremities bilaterally for DVT prophylaxis. Once the pt was prepped and draped and anesthesia was found to be adequate, a Pfannensteil skin incision was made on the abdomen and carried down to the fascia. The fascia was incised and extended with Darby scissors. Kocher clamps were placed on the superior and inferior aspect of the fascia and the rectus abdominal muscles were sharply and bluntly taken down. The rectus abdominal muscles were  in the midline and the periteoneum was entered and bluntly extended. A bladder blade was then inserted and a bladder flap was created. A low transverse incision was made on the uterus and bluntly extended. Artificial rupture of membranes was performed with clear  fluid noted. The fetal head was delivered followed by the rest of the body. Cord was clamped and cut once it had stopped pulsating and the baby was taken to the warmer. Cord blood was collected and the placenta was delivered. The placenta was sent to pathology for review.  The uterus was exteriorized and cleared of all clots and debris. The uterine incision was repaired in 2 layers. The first layer was a running and locked fashion with 0-Vicryl and the second layer was an imbricating repair with 0-Vicryl suture. The uterus was firm and hemostatic. The abdomen was irrigated and aspirated with warm normal saline. The uterus was placed back into the abdomen. The fascia was reapproximated with 0-vicryl suture. The subcutaneous layer was irrigated and cauterized as needed for hemostasis. The skin was reapproximated with 4-0 vicryl. All counts were correct for the procedure.  Pt tolerated the procedure well. Mom and baby are stable and progressing well. Assistant: Jenelle Ling CSA was responsible for performing the following activities: Retraction, Suction, Irrigation, Suturing, Closing and Delivery of Fetus and their skilled assistance was necessary for the success of this case.       Complications:   None      Disposition:   Mother to Mother Baby/Postpartum  in stable condition currently.   Baby to remains with mom  in stable condition currently.       Paris Fitzgerald MD  1/7/2022  09:03 EST

## 2022-01-07 NOTE — ANESTHESIA POSTPROCEDURE EVALUATION
Patient: Theresa Nagy    Procedure Summary     Date: 22 Room / Location: [facility] LABOR DELIVERY  [facility] LABOR DELIVERY    Anesthesia Start: 758 Anesthesia Stop: 917    Procedure:  SECTION REPEAT (N/A Abdomen) Diagnosis:       Previous  section      Antepartum mild preeclampsia      (Previous  section [Z98.891])      (Antepartum mild preeclampsia [O14.00])    Surgeons: Paris Fitzgerald MD Provider: Lisa Wetzel CRNA    Anesthesia Type: spinal, ITN ASA Status: 2          Anesthesia Type: spinal, ITN    Vitals  No vitals data found for the desired time range.          Post Anesthesia Care and Evaluation    Patient location during evaluation: bedside  Patient participation: complete - patient participated  Level of consciousness: awake  Pain management: adequate  Airway patency: patent  Anesthetic complications: No anesthetic complications  PONV Status: none  Cardiovascular status: acceptable  Respiratory status: acceptable  Hydration status: acceptable

## 2022-01-07 NOTE — ANESTHESIA PROCEDURE NOTES
Spinal Block      Patient reassessed immediately prior to procedure    Patient location during procedure: OB  Start Time: 1/7/2022 8:04 AM  Stop Time: 1/7/2022 8:08 AM  Indication:at surgeon's request and procedure for pain  Performed By  CRNA: Lisa Wetzel CRNA  Preanesthetic Checklist  Completed: patient identified, IV checked, site marked, risks and benefits discussed, surgical consent, monitors and equipment checked, pre-op evaluation and timeout performed  Spinal Block Prep:  Patient Position:sitting  Sterile Tech:cap, gloves, mask and sterile barriers  Prep:Betadine  Patient Monitoring:blood pressure monitoring, continuous pulse oximetry and EKG  Spinal Block Procedure  Approach:midline  Guidance:landmark technique and palpation technique  Location:L4-L5  Needle Type:Pencan  Needle Gauge:27 G  Placement of Spinal needle event:cerebrospinal fluid aspirated  Paresthesia: no  Fluid Appearance:clear     Post Assessment  Patient Tolerance:patient tolerated the procedure well with no apparent complications  Complications no  Additional Notes  Lidocaine 2% 1ml skin infiltration

## 2022-01-08 LAB
BASOPHILS # BLD AUTO: 0.03 10*3/MM3 (ref 0–0.2)
BASOPHILS NFR BLD AUTO: 0.4 % (ref 0–1.5)
DEPRECATED RDW RBC AUTO: 46.2 FL (ref 37–54)
EOSINOPHIL # BLD AUTO: 0.23 10*3/MM3 (ref 0–0.4)
EOSINOPHIL NFR BLD AUTO: 2.8 % (ref 0.3–6.2)
ERYTHROCYTE [DISTWIDTH] IN BLOOD BY AUTOMATED COUNT: 14.1 % (ref 12.3–15.4)
HCT VFR BLD AUTO: 31.8 % (ref 34–46.6)
HGB BLD-MCNC: 10.4 G/DL (ref 12–15.9)
IMM GRANULOCYTES # BLD AUTO: 0.03 10*3/MM3 (ref 0–0.05)
IMM GRANULOCYTES NFR BLD AUTO: 0.4 % (ref 0–0.5)
LYMPHOCYTES # BLD AUTO: 1.9 10*3/MM3 (ref 0.7–3.1)
LYMPHOCYTES NFR BLD AUTO: 23.1 % (ref 19.6–45.3)
MCH RBC QN AUTO: 29.1 PG (ref 26.6–33)
MCHC RBC AUTO-ENTMCNC: 32.7 G/DL (ref 31.5–35.7)
MCV RBC AUTO: 89.1 FL (ref 79–97)
MONOCYTES # BLD AUTO: 0.51 10*3/MM3 (ref 0.1–0.9)
MONOCYTES NFR BLD AUTO: 6.2 % (ref 5–12)
NEUTROPHILS NFR BLD AUTO: 5.52 10*3/MM3 (ref 1.7–7)
NEUTROPHILS NFR BLD AUTO: 67.1 % (ref 42.7–76)
NRBC BLD AUTO-RTO: 0 /100 WBC (ref 0–0.2)
PLATELET # BLD AUTO: 173 10*3/MM3 (ref 140–450)
PMV BLD AUTO: 10.6 FL (ref 6–12)
RBC # BLD AUTO: 3.57 10*6/MM3 (ref 3.77–5.28)
WBC NRBC COR # BLD: 8.22 10*3/MM3 (ref 3.4–10.8)

## 2022-01-08 PROCEDURE — 85025 COMPLETE CBC W/AUTO DIFF WBC: CPT | Performed by: OBSTETRICS & GYNECOLOGY

## 2022-01-08 PROCEDURE — 0 MAGNESIUM SULFATE 20 GM/500ML SOLUTION: Performed by: OBSTETRICS & GYNECOLOGY

## 2022-01-08 PROCEDURE — 25010000002 KETOROLAC TROMETHAMINE PER 15 MG: Performed by: OBSTETRICS & GYNECOLOGY

## 2022-01-08 PROCEDURE — 0503F POSTPARTUM CARE VISIT: CPT | Performed by: OBSTETRICS & GYNECOLOGY

## 2022-01-08 RX ADMIN — SODIUM CHLORIDE, POTASSIUM CHLORIDE, SODIUM LACTATE AND CALCIUM CHLORIDE 75 ML/HR: 600; 310; 30; 20 INJECTION, SOLUTION INTRAVENOUS at 01:15

## 2022-01-08 RX ADMIN — IBUPROFEN 800 MG: 800 TABLET ORAL at 19:48

## 2022-01-08 RX ADMIN — PRENATAL VIT W/ FE FUMARATE-FA TAB 27-0.8 MG 1 TABLET: 27-0.8 TAB at 08:15

## 2022-01-08 RX ADMIN — OXYCODONE HYDROCHLORIDE AND ACETAMINOPHEN 2 TABLET: 5; 325 TABLET ORAL at 19:48

## 2022-01-08 RX ADMIN — MAGNESIUM SULFATE IN WATER 2 G/HR: 40 INJECTION, SOLUTION INTRAVENOUS at 10:24

## 2022-01-08 RX ADMIN — KETOROLAC TROMETHAMINE 30 MG: 30 INJECTION, SOLUTION INTRAMUSCULAR; INTRAVENOUS at 03:17

## 2022-01-08 RX ADMIN — KETOROLAC TROMETHAMINE 30 MG: 30 INJECTION, SOLUTION INTRAMUSCULAR; INTRAVENOUS at 10:23

## 2022-01-08 RX ADMIN — Medication 324 MG: at 08:15

## 2022-01-08 RX ADMIN — OXYCODONE HYDROCHLORIDE AND ACETAMINOPHEN 2 TABLET: 5; 325 TABLET ORAL at 15:04

## 2022-01-08 NOTE — PROGRESS NOTES
Patient: Theresa Nagy  Procedure(s):   SECTION REPEAT  Anesthesia type: spinal, ITN    Patient location: Labor and Delivery  Vitals:    22 0715 22 0815 22 0915 22 1015   BP: 137/77 136/64 133/64 135/70   BP Location: Left arm Left arm Left arm Left arm   Patient Position: Sitting Sitting  Sitting   Pulse: 77 76  74   Resp: 18 18 18 18   Temp: 98.2 °F (36.8 °C)      TempSrc: Oral      SpO2: 99% 98% 98% 99%   Weight:         Level of consciousness: awake, alert and oriented    Post-anesthesia pain: adequate analgesia  Airway patency: patent  Respiratory: unassisted  Cardiovascular: stable and blood pressure at baseline  Hydration: euvolemic    Anesthetic complications: no

## 2022-01-08 NOTE — PLAN OF CARE
Problem: Adult Inpatient Plan of Care  Goal: Plan of Care Review  Outcome: Ongoing, Progressing  Flowsheets (Taken 1/8/2022 0635)  Progress: improving  Plan of Care Reviewed With: patient  Outcome Summary:   VSS   BPs labile. Denies HA, vision changes. Meds given as scheduled. Fundus firm, at u, scant bleeding. Magnesium sulfate infusing at 50 ml/hr. Urine output WDL. Breast and bottlefeeding. bonding well w/ infant.   Goal Outcome Evaluation:  Plan of Care Reviewed With: patient        Progress: improving  Outcome Summary: VSS; BPs labile. Denies HA, vision changes. Meds given as scheduled. Fundus firm, at u, scant bleeding. Magnesium sulfate infusing at 50 ml/hr. Urine output WDL. Breast and bottlefeeding. bonding well w/ infant.

## 2022-01-08 NOTE — PROGRESS NOTES
OZZIE Downing    Progress Note       Patient Name: Theresa Nagy  :  1985  MRN:  [id number]          Subjective  Postpartum Day 1:     The patient feels tired.  Her pain is well controlled with prescribed pain medications.   She is not ambulating well as she is on Mag sulfate.  Patient describes her bleeding as thin lochia.    Breastfeeding: has not attempted yet.           /64 (BP Location: Left arm)   Pulse 76   Temp 98.2 °F (36.8 °C) (Oral)   Resp 18   Wt 111 kg (245 lb)   LMP 2021 (Exact Date)   SpO2 98%   Breastfeeding Yes   BMI 46.29 kg/m²     Review of Systems   Constitutional: Positive for activity change and fatigue.   Eyes: Positive for visual disturbance (occ blurry vision).   Genitourinary: Positive for vaginal bleeding.   Psychiatric/Behavioral: Positive for sleep disturbance.   All other systems reviewed and are negative.    Physical Exam:    Physical Exam  Vitals and nursing note reviewed.   Constitutional:       Appearance: Normal appearance. She is well-developed. She is obese.   HENT:      Head: Normocephalic and atraumatic.   Eyes:      General: No scleral icterus.     Conjunctiva/sclera: Conjunctivae normal.   Neck:      Thyroid: No thyromegaly.   Abdominal:      General: There is no distension.      Palpations: Abdomen is soft. There is no mass.      Tenderness: There is no abdominal tenderness. There is no guarding or rebound.      Hernia: No hernia is present.      Comments: FF , no FT  Bandage C/D/I   Musculoskeletal:      Right lower leg: Edema (2+ edema to midcalf) present.      Left lower leg: Edema present.   Skin:     General: Skin is warm and dry.   Neurological:      Mental Status: She is alert and oriented to person, place, and time.   Psychiatric:         Mood and Affect: Mood normal.         Behavior: Behavior normal.         Thought Content: Thought content normal.         Judgment: Judgment normal.           Lab results reviewed:  Yes.   Results from last 7  days   Lab Units 01/08/22  0619   HEMOGLOBIN g/dL 10.4*     Infant: female  Feeding:both  Rh status: positive, Rhogam was not indicated  Rubella: Immune  Contraception: undecided              1) POD#1: Progressing well. Hgb:10.4, HD stable, start iron    2)Mild preeclampsia- BP stable on Mag sulfate, BPs 120s-140s/60-90s, labetalol held. Has diuresed > 4 liters since Mag started and we will stop her Mag at noon.     3)Postpartum Care: Will advance once magnesium therapy is completed    4) Home once stable        Paris Fitzgerald MD    1/8/2022  09:56 EST

## 2022-01-08 NOTE — PLAN OF CARE
Goal Outcome Evaluation:            Working towards all goals prior to discharge tomorrow. Patient will maintain stable vitals, ambulate and continue to maintain pain and a controllable level

## 2022-01-09 VITALS
WEIGHT: 245 LBS | HEART RATE: 60 BPM | BODY MASS INDEX: 46.29 KG/M2 | OXYGEN SATURATION: 98 % | SYSTOLIC BLOOD PRESSURE: 137 MMHG | RESPIRATION RATE: 16 BRPM | TEMPERATURE: 97.7 F | DIASTOLIC BLOOD PRESSURE: 81 MMHG

## 2022-01-09 PROCEDURE — G0008 ADMIN INFLUENZA VIRUS VAC: HCPCS | Performed by: OBSTETRICS & GYNECOLOGY

## 2022-01-09 PROCEDURE — 90686 IIV4 VACC NO PRSV 0.5 ML IM: CPT | Performed by: OBSTETRICS & GYNECOLOGY

## 2022-01-09 PROCEDURE — 25010000002 TETANUS-DIPHTH-ACELL PERTUSSIS 5-2.5-18.5 LF-MCG/0.5 SUSPENSION PREFILLED SYRINGE: Performed by: OBSTETRICS & GYNECOLOGY

## 2022-01-09 PROCEDURE — 90715 TDAP VACCINE 7 YRS/> IM: CPT | Performed by: OBSTETRICS & GYNECOLOGY

## 2022-01-09 PROCEDURE — 25010000002 INFLUENZA VAC SPLIT QUAD 0.5 ML SUSPENSION PREFILLED SYRINGE: Performed by: OBSTETRICS & GYNECOLOGY

## 2022-01-09 PROCEDURE — 0503F POSTPARTUM CARE VISIT: CPT | Performed by: OBSTETRICS & GYNECOLOGY

## 2022-01-09 PROCEDURE — 90471 IMMUNIZATION ADMIN: CPT | Performed by: OBSTETRICS & GYNECOLOGY

## 2022-01-09 RX ORDER — OXYCODONE HYDROCHLORIDE AND ACETAMINOPHEN 5; 325 MG/1; MG/1
1 TABLET ORAL EVERY 4 HOURS PRN
Qty: 30 TABLET | Refills: 0 | Status: SHIPPED | OUTPATIENT
Start: 2022-01-09 | End: 2022-01-14

## 2022-01-09 RX ORDER — ACETAMINOPHEN AND CODEINE PHOSPHATE 120; 12 MG/5ML; MG/5ML
1 SOLUTION ORAL DAILY
Qty: 28 TABLET | Refills: 6 | Status: SHIPPED | OUTPATIENT
Start: 2022-01-09 | End: 2022-07-01

## 2022-01-09 RX ORDER — IBUPROFEN 800 MG/1
800 TABLET ORAL EVERY 8 HOURS PRN
Qty: 30 TABLET | Refills: 0 | Status: SHIPPED | OUTPATIENT
Start: 2022-01-09 | End: 2022-07-01

## 2022-01-09 RX ORDER — PSEUDOEPHEDRINE HCL 30 MG
100 TABLET ORAL 2 TIMES DAILY PRN
Qty: 60 CAPSULE | Refills: 0 | Status: SHIPPED | OUTPATIENT
Start: 2022-01-09 | End: 2022-07-01

## 2022-01-09 RX ADMIN — OXYCODONE HYDROCHLORIDE AND ACETAMINOPHEN 2 TABLET: 5; 325 TABLET ORAL at 04:21

## 2022-01-09 RX ADMIN — Medication 324 MG: at 07:52

## 2022-01-09 RX ADMIN — PRENATAL VIT W/ FE FUMARATE-FA TAB 27-0.8 MG 1 TABLET: 27-0.8 TAB at 07:52

## 2022-01-09 RX ADMIN — IBUPROFEN 800 MG: 800 TABLET ORAL at 04:21

## 2022-01-09 RX ADMIN — TETANUS TOXOID, REDUCED DIPHTHERIA TOXOID AND ACELLULAR PERTUSSIS VACCINE, ADSORBED 0.5 ML: 5; 2.5; 8; 8; 2.5 SUSPENSION INTRAMUSCULAR at 12:39

## 2022-01-09 RX ADMIN — INFLUENZA VIRUS VACCINE 0.5 ML: 15; 15; 15; 15 SUSPENSION INTRAMUSCULAR at 12:37

## 2022-01-09 NOTE — PLAN OF CARE
Problem: Adult Inpatient Plan of Care  Goal: Plan of Care Review  Outcome: Ongoing, Progressing  Flowsheets (Taken 1/9/2022 0633)  Progress: improving  Plan of Care Reviewed With: patient  Outcome Summary:   VSS. Denies HA, vision changes. Meds given for pain. Breastfeeding   bottlefeeding. Bonding well w/ infant.   Goal Outcome Evaluation:  Plan of Care Reviewed With: patient        Progress: improving  Outcome Summary: VSS. Denies HA, vision changes. Meds given for pain. Breastfeeding; bottlefeeding. Bonding well w/ infant.

## 2022-01-09 NOTE — PLAN OF CARE
Problem: Adult Inpatient Plan of Care  Goal: Plan of Care Review  Outcome: Met  Goal: Patient-Specific Goal (Individualized)  Outcome: Met  Goal: Absence of Hospital-Acquired Illness or Injury  Outcome: Met  Intervention: Identify and Manage Fall Risk  Recent Flowsheet Documentation  Taken 2022 1144 by Jaimee Valle RN  Safety Promotion/Fall Prevention: safety round/check completed  Taken 2022 0920 by Jaimee Valle RN  Safety Promotion/Fall Prevention: safety round/check completed  Taken 2022 0801 by Jaimee Valle RN  Safety Promotion/Fall Prevention: safety round/check completed  Intervention: Prevent Skin Injury  Recent Flowsheet Documentation  Taken 2022 08 by Jaimee Valle RN  Body Position: sitting up in bed  Goal: Optimal Comfort and Wellbeing  Outcome: Met  Intervention: Provide Person-Centered Care  Recent Flowsheet Documentation  Taken 2022 08 by Jaimee Valle RN  Trust Relationship/Rapport:   care explained   choices provided  Goal: Readiness for Transition of Care  Outcome: Met     Problem: Adjustment to Role Transition (Postpartum  Delivery)  Goal: Successful Maternal Role Transition  Outcome: Met     Problem: Bleeding (Postpartum  Delivery)  Goal: Hemostasis  Outcome: Met     Problem: Infection (Postpartum  Delivery)  Goal: Absence of Infection Signs and Symptoms  Outcome: Met     Problem: Pain (Postpartum  Delivery)  Goal: Acceptable Pain Control  Outcome: Met  Intervention: Prevent or Manage Pain  Recent Flowsheet Documentation  Taken 2022 08 by Jaimee Valle RN  Pain Management Interventions: no interventions per patient request     Problem: Postoperative Nausea and Vomiting (Postpartum  Delivery)  Goal: Nausea and Vomiting Relief  Outcome: Met     Problem: Postoperative Urinary Retention (Postpartum  Delivery)  Goal: Effective Urinary Elimination  Outcome: Met   Goal Outcome Evaluation:

## 2022-01-10 ENCOUNTER — HOSPITAL ENCOUNTER (EMERGENCY)
Facility: HOSPITAL | Age: 37
Discharge: HOME | End: 2022-01-10
Attending: EMERGENCY MEDICINE | Admitting: EMERGENCY MEDICINE

## 2022-01-10 ENCOUNTER — APPOINTMENT (OUTPATIENT)
Dept: GENERAL RADIOLOGY | Facility: HOSPITAL | Age: 37
End: 2022-01-10

## 2022-01-10 ENCOUNTER — APPOINTMENT (OUTPATIENT)
Dept: CT IMAGING | Facility: HOSPITAL | Age: 37
End: 2022-01-10

## 2022-01-10 ENCOUNTER — TELEPHONE (OUTPATIENT)
Dept: OBSTETRICS AND GYNECOLOGY | Facility: CLINIC | Age: 37
End: 2022-01-10

## 2022-01-10 VITALS
TEMPERATURE: 99 F | RESPIRATION RATE: 16 BRPM | BODY MASS INDEX: 45.41 KG/M2 | HEART RATE: 71 BPM | OXYGEN SATURATION: 98 % | SYSTOLIC BLOOD PRESSURE: 146 MMHG | WEIGHT: 240.5 LBS | HEIGHT: 61 IN | DIASTOLIC BLOOD PRESSURE: 81 MMHG

## 2022-01-10 DIAGNOSIS — O13.9 GESTATIONAL HYPERTENSION, ANTEPARTUM: Primary | ICD-10-CM

## 2022-01-10 LAB
ALBUMIN SERPL-MCNC: 3.2 G/DL (ref 3.5–5.2)
ALBUMIN/GLOB SERPL: 1.1 G/DL
ALP SERPL-CCNC: 187 U/L (ref 39–117)
ALT SERPL W P-5'-P-CCNC: 31 U/L (ref 1–33)
ANION GAP SERPL CALCULATED.3IONS-SCNC: 11.3 MMOL/L (ref 5–15)
AST SERPL-CCNC: 32 U/L (ref 1–32)
BASOPHILS # BLD AUTO: 0.04 10*3/MM3 (ref 0–0.2)
BASOPHILS NFR BLD AUTO: 0.5 % (ref 0–1.5)
BILIRUB SERPL-MCNC: 0.2 MG/DL (ref 0–1.2)
BUN SERPL-MCNC: 7 MG/DL (ref 6–20)
BUN/CREAT SERPL: 12.1 (ref 7–25)
CALCIUM SPEC-SCNC: 9.9 MG/DL (ref 8.6–10.5)
CHLORIDE SERPL-SCNC: 102 MMOL/L (ref 98–107)
CO2 SERPL-SCNC: 21.7 MMOL/L (ref 22–29)
CREAT SERPL-MCNC: 0.58 MG/DL (ref 0.57–1)
D DIMER PPP FEU-MCNC: 1.45 MCGFEU/ML (ref 0–0.46)
DEPRECATED RDW RBC AUTO: 45.9 FL (ref 37–54)
EOSINOPHIL # BLD AUTO: 0.31 10*3/MM3 (ref 0–0.4)
EOSINOPHIL NFR BLD AUTO: 3.8 % (ref 0.3–6.2)
ERYTHROCYTE [DISTWIDTH] IN BLOOD BY AUTOMATED COUNT: 14.1 % (ref 12.3–15.4)
GFR SERPL CREATININE-BSD FRML MDRD: 118 ML/MIN/1.73
GLOBULIN UR ELPH-MCNC: 3 GM/DL
GLUCOSE SERPL-MCNC: 106 MG/DL (ref 65–99)
HCT VFR BLD AUTO: 32.2 % (ref 34–46.6)
HGB BLD-MCNC: 10.5 G/DL (ref 12–15.9)
IMM GRANULOCYTES # BLD AUTO: 0.05 10*3/MM3 (ref 0–0.05)
IMM GRANULOCYTES NFR BLD AUTO: 0.6 % (ref 0–0.5)
LYMPHOCYTES # BLD AUTO: 1.51 10*3/MM3 (ref 0.7–3.1)
LYMPHOCYTES NFR BLD AUTO: 18.4 % (ref 19.6–45.3)
MCH RBC QN AUTO: 28.9 PG (ref 26.6–33)
MCHC RBC AUTO-ENTMCNC: 32.6 G/DL (ref 31.5–35.7)
MCV RBC AUTO: 88.7 FL (ref 79–97)
MONOCYTES # BLD AUTO: 0.48 10*3/MM3 (ref 0.1–0.9)
MONOCYTES NFR BLD AUTO: 5.8 % (ref 5–12)
NEUTROPHILS NFR BLD AUTO: 5.82 10*3/MM3 (ref 1.7–7)
NEUTROPHILS NFR BLD AUTO: 70.9 % (ref 42.7–76)
NRBC BLD AUTO-RTO: 0 /100 WBC (ref 0–0.2)
PLATELET # BLD AUTO: 206 10*3/MM3 (ref 140–450)
PMV BLD AUTO: 10.4 FL (ref 6–12)
POTASSIUM SERPL-SCNC: 4.3 MMOL/L (ref 3.5–5.2)
PROT SERPL-MCNC: 6.2 G/DL (ref 6–8.5)
QT INTERVAL: 416 MS
RBC # BLD AUTO: 3.63 10*6/MM3 (ref 3.77–5.28)
SODIUM SERPL-SCNC: 135 MMOL/L (ref 136–145)
TROPONIN T SERPL-MCNC: <0.01 NG/ML (ref 0–0.03)
WBC NRBC COR # BLD: 8.21 10*3/MM3 (ref 3.4–10.8)

## 2022-01-10 PROCEDURE — 99284 EMERGENCY DEPT VISIT MOD MDM: CPT | Performed by: EMERGENCY MEDICINE

## 2022-01-10 PROCEDURE — 99284 EMERGENCY DEPT VISIT MOD MDM: CPT

## 2022-01-10 PROCEDURE — 93010 ELECTROCARDIOGRAM REPORT: CPT | Performed by: INTERNAL MEDICINE

## 2022-01-10 PROCEDURE — 71045 X-RAY EXAM CHEST 1 VIEW: CPT

## 2022-01-10 PROCEDURE — 0 IOPAMIDOL PER 1 ML: Performed by: EMERGENCY MEDICINE

## 2022-01-10 PROCEDURE — 84484 ASSAY OF TROPONIN QUANT: CPT | Performed by: EMERGENCY MEDICINE

## 2022-01-10 PROCEDURE — 93005 ELECTROCARDIOGRAM TRACING: CPT | Performed by: EMERGENCY MEDICINE

## 2022-01-10 PROCEDURE — 85025 COMPLETE CBC W/AUTO DIFF WBC: CPT | Performed by: EMERGENCY MEDICINE

## 2022-01-10 PROCEDURE — 71275 CT ANGIOGRAPHY CHEST: CPT

## 2022-01-10 PROCEDURE — 85379 FIBRIN DEGRADATION QUANT: CPT | Performed by: EMERGENCY MEDICINE

## 2022-01-10 PROCEDURE — 80053 COMPREHEN METABOLIC PANEL: CPT | Performed by: EMERGENCY MEDICINE

## 2022-01-10 RX ORDER — HYDRALAZINE HYDROCHLORIDE 10 MG/1
10 TABLET, FILM COATED ORAL 4 TIMES DAILY
Qty: 56 TABLET | Refills: 0 | Status: SHIPPED | OUTPATIENT
Start: 2022-01-10 | End: 2022-07-01

## 2022-01-10 RX ORDER — SODIUM CHLORIDE 0.9 % (FLUSH) 0.9 %
10 SYRINGE (ML) INJECTION AS NEEDED
Status: DISCONTINUED | OUTPATIENT
Start: 2022-01-10 | End: 2022-01-10 | Stop reason: HOSPADM

## 2022-01-10 RX ORDER — LABETALOL 100 MG/1
100 TABLET, FILM COATED ORAL 2 TIMES DAILY
COMMUNITY
End: 2022-01-10

## 2022-01-10 RX ADMIN — IOPAMIDOL 100 ML: 755 INJECTION, SOLUTION INTRAVENOUS at 11:13

## 2022-01-10 NOTE — TELEPHONE ENCOUNTER
Patient called stated she had a c/s on Friday and was discharged home yesterday. She is swollen and b/p is 155/114. Advised patient to go to ER.

## 2022-01-10 NOTE — ED PROVIDER NOTES
"Subjective   Theresa Nagy is a 36-year-old white female who present secondary to elevated blood pressure, dizziness and shortness of breath.  Patient delivered a child 3 days ago via .  Patient had been preeclamptic with gestational hypertension during latter part of this pregnancy.  When patient was discharged home she did not continue her blood pressure medication.  Approximately 5:00 this morning patient \"felt funny\".  She checked her blood pressure which was in the 150s over 100 teens.  Associated dizziness, shortness of breath and left shoulder pain.  Patient took a leftover labetalol.  She then elected to come to the ED for evaluation.    Patient is not vaccinated for COVID-19.      History provided by:  Patient      Review of Systems   Constitutional: Negative.  Negative for fever.   HENT: Negative.  Negative for rhinorrhea.    Eyes: Negative.  Negative for redness.   Respiratory: Positive for shortness of breath. Negative for cough.    Cardiovascular: Negative for chest pain.   Gastrointestinal: Negative for abdominal pain.   Endocrine: Negative.    Genitourinary: Negative.  Negative for difficulty urinating.   Musculoskeletal: Negative.  Negative for back pain.   Skin: Negative.  Negative for color change.   Neurological: Positive for dizziness. Negative for syncope.   Hematological: Negative.    Psychiatric/Behavioral: Negative.    All other systems reviewed and are negative.      Past Medical History:   Diagnosis Date   • Anxiety    • Depression    • Gestational hypertension        No Known Allergies    Past Surgical History:   Procedure Laterality Date   •  SECTION     •  SECTION N/A 2022    Procedure:  SECTION REPEAT;  Surgeon: Paris Fitzgerald MD;  Location: [facility] LABOR DELIVERY;  Service: Obstetrics/Gynecology;  Laterality: N/A;   • CHOLECYSTECTOMY     • TUBAL ABDOMINAL LIGATION      and reversal       Family History   Problem Relation Age of Onset   • " Depression Mother    • Anxiety disorder Mother    • Diabetes Father    • Hypertension Father    • Heart disease Father        Social History     Socioeconomic History   • Marital status:    Tobacco Use   • Smoking status: Never Smoker   • Smokeless tobacco: Never Used   Vaping Use   • Vaping Use: Never used   Substance and Sexual Activity   • Alcohol use: Never   • Drug use: Never   • Sexual activity: Yes     Partners: Male           Objective   Physical Exam  Vitals and nursing note reviewed.   Constitutional:       General: She is not in acute distress.     Appearance: Normal appearance. She is well-developed. She is obese. She is not ill-appearing, toxic-appearing or diaphoretic.      Comments: 36-year-old  female laying in bed.  Patient is obese.  She otherwise appears in good health.  Vital signs notable for blood pressure 163/90.  Patient friendly and cooperative.  She does not appear in any distress.   HENT:      Head: Normocephalic and atraumatic.      Right Ear: Tympanic membrane, ear canal and external ear normal.      Left Ear: Tympanic membrane, ear canal and external ear normal.      Nose: Nose normal.      Mouth/Throat:      Mouth: Mucous membranes are moist.      Pharynx: Oropharynx is clear.   Eyes:      Extraocular Movements: Extraocular movements intact.      Conjunctiva/sclera: Conjunctivae normal.      Pupils: Pupils are equal, round, and reactive to light.   Cardiovascular:      Rate and Rhythm: Normal rate and regular rhythm.      Pulses: Normal pulses.      Heart sounds: Normal heart sounds. No murmur heard.  No friction rub. No gallop.    Pulmonary:      Effort: Pulmonary effort is normal.      Breath sounds: Normal breath sounds.   Abdominal:      General: Bowel sounds are normal. There is no distension.      Palpations: Abdomen is soft. There is no mass.      Tenderness: There is no abdominal tenderness. There is no guarding or rebound.      Hernia: No hernia is present.    Musculoskeletal:         General: Normal range of motion.      Cervical back: Normal range of motion and neck supple.   Skin:     General: Skin is warm and dry.      Capillary Refill: Capillary refill takes less than 2 seconds.   Neurological:      General: No focal deficit present.      Mental Status: She is alert and oriented to person, place, and time.      Deep Tendon Reflexes: Reflexes are normal and symmetric.   Psychiatric:         Mood and Affect: Mood normal.         Behavior: Behavior normal.         Procedures       EKG 12-lead  Date 1/10/2022  Time 09: 27  Normal sinus rhythm.  Normal rate.  Normal axis.  Normal intervals.  Normal ST segments.  Normal T waves.  Normal EKG.      ED Course  ED Course as of 01/10/22 1649   Mon Tato 10, 2022   0918 Patient stopped taking labetalol at time of discharge from the hospital.  This obviously can be contributing factor to her elevated blood pressure this morning.  With dizziness, shortness of breath and left shoulder pain will obtain full set of labs including troponin and D-dimer, chest x-ray and EKG.  Patient's current blood pressure 163/90.  No intervention warranted at this time. [SS]   0919 Chest x-ray is unremarkable. [SS]   1013 Hemoglobin(!): 10.5 [SS]   1013 Hematocrit(!): 32.2 [SS]   1014 D-Dimer, Quant(!): 1.45 [SS]   1014 CBC shows hemoglobin 10.5 hematocrit 32.2.  This is similar to lab work after .  CMP shows minimal evidence of dehydration with CO2 21.7.  Otherwise unremarkable.  Patient's D-dimer elevated at 1.45.  While this could be secondary to pregnancy patient is also at risk for development of blood clots.  Thus will obtain CT angiogram chest. [SS]   1148 CT is unremarkable.  No evidence of PE.  I feel D-dimer elevated secondary to recent pregnancy.  Discussed at length with patient all results, diagnoses, treatment and follow-up.  Will place patient on hydralazine for gestational hypertension.  This is considered safer for  breast-feeding than labetalol.  Also advised patient to pump breastmilk for the next 24 hours and disposed of.  She may then resume breast-feeding.  Will DC home.Prescription1 hydralazine [SS]      ED Course User Index  [SS] Iban Briceno MD      Labs Reviewed   COMPREHENSIVE METABOLIC PANEL - Abnormal; Notable for the following components:       Result Value    Glucose 106 (*)     Sodium 135 (*)     CO2 21.7 (*)     Albumin 3.20 (*)     Alkaline Phosphatase 187 (*)     All other components within normal limits    Narrative:     GFR Normal >60  Chronic Kidney Disease <60  Kidney Failure <15     D-DIMER, QUANTITATIVE - Abnormal; Notable for the following components:    D-Dimer, Quantitative 1.45 (*)     All other components within normal limits    Narrative:     Can be elevated in, but is not diagnostic for deep vein thrombosis (DVT) or pulmonary embolis (PE).  It is also elevated in other medical conditions.  Clinical correlation is required.  The negative cut-off value for the D-Dimer is 0.50 mcg FEU/mL for DVT and PE.     CBC WITH AUTO DIFFERENTIAL - Abnormal; Notable for the following components:    RBC 3.63 (*)     Hemoglobin 10.5 (*)     Hematocrit 32.2 (*)     Lymphocyte % 18.4 (*)     Immature Grans % 0.6 (*)     All other components within normal limits   TROPONIN (IN-HOUSE) - Normal    Narrative:     Troponin T Reference Range:  <= 0.03 ng/mL-   Negative for AMI  >0.03 ng/mL-     Abnormal for myocardial necrosis.  Clinicians would have to utilize clinical acumen, EKG, Troponin and serial changes to determine if it is an Acute Myocardial Infarction or myocardial injury due to an underlying chronic condition.       Results may be falsely decreased if patient taking Biotin.     CBC AND DIFFERENTIAL    Narrative:     The following orders were created for panel order CBC & Differential.  Procedure                               Abnormality         Status                     ---------                                -----------         ------                     CBC Auto Differential[[id number]]        Abnormal            Final result                 Please view results for these tests on the individual orders.     US Ob Follow Up Transabdominal Approach    Result Date: 2022  Narrative: BPP 8/8 MARIMAR 11cm EFW 31% Vertex +FCA    US Fetal Biophysical Profile No Stress    Result Date: 1/3/2022  Narrative: BPP = 8/8, MARIMAR = 10, NST = R = 10/10    US Fetal Biophysical Profile;With Non-Stress Testing    Result Date: 2021  Narrative: BPP = 8/8, MARIMAR = 12cm    XR Chest 1 View    Result Date: 1/10/2022  Narrative: AP PORTABLE CHEST, 01/10/2021  HISTORY: Left shoulder pain and shortness of breath since this morning  COMPARISON: NONE  TECHNIQUE: AP portable chest x-ray.  FINDINGS: Heart size and pulmonary vascularity are normal. The lungs are expanded and clear. No visible pulmonary infiltrate or pleural effusion.       Impression: Negative single view chest  This report was finalized on 1/10/2022 10:12 AM by Dr. Dominik Correa MD.      CT Angiogram Chest    Result Date: 1/10/2022  Narrative: CT Chest with Pulmonary Embolus protocol: 1/10/2022 10:54 AM  INDICATION: Patient is 3 days status post  and has new onset shortness of air this morning with dizziness.  TECHNIQUE: CTA of the thorax with contrast. Coronal and sagittal and 3D reconstructions were obtained.  Radiation dose reduction techniques included automated exposure control or exposure modulation based on body size. Radiation audit for number of CT and nuclear cardiology exams performed in the last year: 0.   COMPARISON: None available.  FINDINGS: There are numerous calcified granulomas bilaterally. Otherwise the lungs are clear. Aorta is normal in size. There is adequate opacification of the pulmonary arteries and there is no CT evidence of pulmonary embolus. There is no adenopathy. Upper abdominal images are unremarkable. Bones are unremarkable.      Impression: No  CT evidence of pulmonary embolus  Lungs are clear  Numerous bilateral calcified granulomas.  This report was finalized on 1/10/2022 11:27 AM by Dr. Dominik Correa MD.      My differential diagnosis for dizziness included but is not limited to:  Labyrinthitis, vertigo, concussion, intracranial hemorrhage, stroke, migraine headache, cardiac arrhythmias, acute MI, hypotension, hypertension, hypoxia, inner ear and middle ear infections, anemia, electrolyte abnormalities, dehydration, hyperventilation and anxiety attacks..    My differential diagnosis for dyspnea includes but is not limited to:  Asthma, COPD, COVID-19, pneumonia, pulmonary embolus, acute respiratory distress syndrome, RSV, pneumothorax, pleural effusion, pulmonary fibrosis, congestive heart failure, myocardial infarction, DKA, uremia, acidosis, sepsis, anemia, drug related, hyperventilation, CNS disease                                             MDM    Final diagnoses:   Gestational hypertension, antepartum       ED Disposition  ED Disposition     ED Disposition Condition Comment    Discharge Stable           Paris Maldonado, [address]   [address]  [facility] [id number] [phone]    Schedule an appointment as soon as possible for a visit in 1 week  For blood pressure recheck and optimization of blood pressure medication.    Wilfredo Delcid MD  [address] [facility] [address]  [facility] [id number] [phone]    Schedule an appointment as soon as possible for a visit in 1 week  For blood pressure recheck, blood pressure medicine optimization.         Medication List      New Prescriptions    hydrALAZINE 10 MG tablet  Commonly known as: APRESOLINE  Take 1 tablet by mouth 4 (Four) Times a Day for 14 days.        Stop    labetalol 100 MG tablet  Commonly known as: NORMODYNE           Where to Get Your Medications      These medications were sent to [facility] [address] [place] [address] RD - [phone]  -  [phone] FX  [address] , [facility] [address]    Phone: [phone]   · hydrALAZINE 10 MG tablet          Iban Briceno MD  01/10/22 7516

## 2022-01-10 NOTE — CASE MANAGEMENT/SOCIAL WORK
Case Management Discharge Note      Final Note: dc home         Selected Continued Care - Discharged on 1/9/2022 Admission date: 1/7/2022 - Discharge disposition: Home or Self Care    Destination    No services have been selected for the patient.              Durable Medical Equipment    No services have been selected for the patient.              Dialysis/Infusion    No services have been selected for the patient.              Home Medical Care    No services have been selected for the patient.              Therapy    No services have been selected for the patient.              Community Resources    No services have been selected for the patient.              Community & DME    No services have been selected for the patient.                       Final Discharge Disposition Code: 01 - home or self-care

## 2022-01-10 NOTE — DISCHARGE INSTRUCTIONS
Medication as directed.  Take blood pressure twice daily and record.  Take this record to follow-up with your OB/GYN or PCP.  Pump breastmilk and dispose for the next 24 hours.  You may then begin breast-feeding.  Follow-up with your OB/GYN or PCP as above.  Return to ED for uncontrolled hypertension, medical emergencies.

## 2022-01-12 ENCOUNTER — POSTPARTUM VISIT (OUTPATIENT)
Dept: OBSTETRICS AND GYNECOLOGY | Facility: CLINIC | Age: 37
End: 2022-01-12

## 2022-01-12 VITALS
SYSTOLIC BLOOD PRESSURE: 118 MMHG | DIASTOLIC BLOOD PRESSURE: 78 MMHG | WEIGHT: 238.6 LBS | BODY MASS INDEX: 45.05 KG/M2 | HEIGHT: 61 IN

## 2022-01-12 DIAGNOSIS — Z13.9 SCREENING FOR CONDITION: Primary | ICD-10-CM

## 2022-01-12 DIAGNOSIS — Z48.89 ENCOUNTER FOR POSTOPERATIVE WOUND CHECK: ICD-10-CM

## 2022-01-12 LAB
BILIRUB BLD-MCNC: NEGATIVE MG/DL
CLARITY, POC: CLEAR
COLOR UR: YELLOW
GLUCOSE UR STRIP-MCNC: NEGATIVE MG/DL
KETONES UR QL: NEGATIVE
LEUKOCYTE EST, POC: NEGATIVE
NITRITE UR-MCNC: NEGATIVE MG/ML
PH UR: 5 [PH] (ref 5–8)
PROT UR STRIP-MCNC: NEGATIVE MG/DL
RBC # UR STRIP: ABNORMAL /UL
SP GR UR: 1 (ref 1–1.03)
UROBILINOGEN UR QL: NORMAL

## 2022-01-12 PROCEDURE — 99214 OFFICE O/P EST MOD 30 MIN: CPT | Performed by: OBSTETRICS & GYNECOLOGY

## 2022-01-12 RX ORDER — SERTRALINE HYDROCHLORIDE 25 MG/1
25 TABLET, FILM COATED ORAL DAILY
Qty: 30 TABLET | Refills: 6 | Status: SHIPPED | OUTPATIENT
Start: 2022-01-12 | End: 2022-07-01

## 2022-01-12 RX ORDER — LABETALOL 100 MG/1
100 TABLET, FILM COATED ORAL 2 TIMES DAILY
Qty: 60 TABLET | Refills: 6 | Status: SHIPPED | OUTPATIENT
Start: 2022-01-12 | End: 2022-07-01

## 2022-01-12 NOTE — PROGRESS NOTES
"      Theresa Nagy is a 36 y.o. patient who presents for follow up of   Chief Complaint   Patient presents with   • Postpartum Care     37 yo est pt here for 1 week BP check. She had a RLTCS on 1/7/2022 for mild preeclampsia and received Mag sulfate and had excellent diuresis. Her labetalol 100 mg BID was not continued postop because her pressure were low. She was discharged home and on 1/10/2022 went to the ER due to dizziness and BPS 150s/100s. She was there started on hydralazine QID. She has been checking her BP at home and feels fine. We discussed changing her back to labetalol 100 mg BID as the dosing is easier and her HTN is mild and not moderate to severe. She is agreeable and will continue to check her BP at home BID and call for any outliers. She is also complaining of depression, which she had before pregnancy as well but feels is worsening. She denies SI/HI. She is interested in medication. She is breastfeeding and her daughter is doing well.         The following portions of the patient's history were reviewed and updated as appropriate: allergies, current medications and problem list.    Review of Systems   Constitutional: Positive for activity change and fatigue.   Genitourinary: Positive for vaginal bleeding.   Psychiatric/Behavioral: Positive for dysphoric mood and sleep disturbance. The patient is nervous/anxious.    All other systems reviewed and are negative.      /78   Ht 154.9 cm (61\")   Wt 108 kg (238 lb 9.6 oz)   Breastfeeding Yes Comment: both bottle and breastfeeding  BMI 45.08 kg/m²     Physical Exam  Vitals and nursing note reviewed.   Constitutional:       Appearance: Normal appearance. She is well-developed. She is obese.   HENT:      Head: Normocephalic and atraumatic.   Eyes:      General: No scleral icterus.     Conjunctiva/sclera: Conjunctivae normal.   Neck:      Thyroid: No thyromegaly.   Abdominal:      General: There is no distension.      Palpations: Abdomen is soft. " There is no mass.      Tenderness: There is no abdominal tenderness. There is no guarding or rebound.      Hernia: No hernia is present.      Comments: Ecchymosis noted around incision   Musculoskeletal:      Right lower leg: No edema.      Left lower leg: No edema.   Skin:     General: Skin is warm and dry.   Neurological:      Mental Status: She is alert and oriented to person, place, and time.   Psychiatric:         Mood and Affect: Mood normal.         Behavior: Behavior normal.         Thought Content: Thought content normal.         Judgment: Judgment normal.         A/P:  1. S/P RLTCS- progressing well postop.   2. H/O preeclampsia- will change back to labetalol 100 mg BID. Check BP BID and call for outliers  3. PP depression- Rx Zoloft 25 mg QD, declines counseling for now  4. RTO 1 week for BP check.     Assessment/Plan   Diagnoses and all orders for this visit:    1. Screening for condition (Primary)  -     POC Urinalysis Dipstick    2. Encounter for postoperative wound check    3. Hypertension in pregnancy, preeclampsia, delivered    4. Postpartum depression    Other orders  -     labetalol (NORMODYNE) 100 MG tablet; Take 1 tablet by mouth 2 (Two) Times a Day.  Dispense: 60 tablet; Refill: 6  -     sertraline (Zoloft) 25 MG tablet; Take 1 tablet by mouth Daily.  Dispense: 30 tablet; Refill: 6                 No follow-ups on file.      Paris Fitzgerald MD    1/12/2022  12:20 EST

## 2022-01-13 LAB
LAB AP CASE REPORT: NORMAL
PATH REPORT.FINAL DX SPEC: NORMAL

## 2022-01-19 ENCOUNTER — POSTPARTUM VISIT (OUTPATIENT)
Dept: OBSTETRICS AND GYNECOLOGY | Facility: CLINIC | Age: 37
End: 2022-01-19

## 2022-01-19 VITALS
DIASTOLIC BLOOD PRESSURE: 88 MMHG | WEIGHT: 219.6 LBS | BODY MASS INDEX: 41.46 KG/M2 | SYSTOLIC BLOOD PRESSURE: 128 MMHG | HEIGHT: 61 IN

## 2022-01-19 DIAGNOSIS — Z13.9 SCREENING FOR CONDITION: Primary | ICD-10-CM

## 2022-01-19 DIAGNOSIS — Z48.89 ENCOUNTER FOR POSTOPERATIVE WOUND CHECK: ICD-10-CM

## 2022-01-19 DIAGNOSIS — Z98.891 PREVIOUS CESAREAN SECTION: ICD-10-CM

## 2022-01-19 PROCEDURE — 0503F POSTPARTUM CARE VISIT: CPT | Performed by: OBSTETRICS & GYNECOLOGY

## 2022-01-19 NOTE — PROGRESS NOTES
"      Theresa Nagy is a 36 y.o. patient who presents for follow up of   Chief Complaint   Patient presents with   • Postpartum Care     37 yo est pt here for21 week BP check. She had a RLTCS on 1/7/2022 for mild preeclampsia and received Mag sulfate and had excellent diuresis. Her labetalol 100 mg BID was not continued postop because her pressure were low. She was discharged home and on 1/10/2022 went to the ER due to dizziness and BPS 150s/100s. She was there started on hydralazine QID and when I saw her last week I switch her back to labetalol 100 mg p.o. twice daily.  She has been checking her blood pressures at home and feels good.  She is also lost over 20 pounds of weight in 10 days.  She is doing well on Zoloft 25 mg and feels she has good control of her symptoms.  She is not having any pain and her baby is doing well      The following portions of the patient's history were reviewed and updated as appropriate: allergies, current medications and problem list.    Review of Systems   Constitutional: Positive for activity change. Negative for fatigue.   Genitourinary: Positive for vaginal bleeding.   Psychiatric/Behavioral: Negative for dysphoric mood and sleep disturbance. The patient is not nervous/anxious.    All other systems reviewed and are negative.      /88   Ht 154.9 cm (61\")   Wt 99.6 kg (219 lb 9.6 oz)   Breastfeeding No   BMI 41.49 kg/m²     Physical Exam  Vitals and nursing note reviewed.   Constitutional:       Appearance: Normal appearance. She is well-developed. She is obese.   HENT:      Head: Normocephalic and atraumatic.   Eyes:      General: No scleral icterus.     Conjunctiva/sclera: Conjunctivae normal.   Neck:      Thyroid: No thyromegaly.   Abdominal:      General: There is no distension.      Palpations: Abdomen is soft. There is no mass.      Tenderness: There is no abdominal tenderness. There is no guarding or rebound.      Hernia: No hernia is present.      Comments: Inc well " healed   Musculoskeletal:      Right lower leg: No edema.      Left lower leg: No edema.   Skin:     General: Skin is warm and dry.   Neurological:      Mental Status: She is alert and oriented to person, place, and time.   Psychiatric:         Mood and Affect: Mood normal.         Behavior: Behavior normal.         Thought Content: Thought content normal.         Judgment: Judgment normal.         A/P:  1. S/P RLTCS- progressing well postop.   2. H/O preeclampsia- normotensive on labetalol 100 mg BID.  3. PP depression- doing well on  Zoloft 25 mg QD, declines counseling for now  4. CS- On Micronor QD  5. RTO for 6 week pp check     Assessment/Plan   Diagnoses and all orders for this visit:    1. Screening for condition (Primary)  -     POC Urinalysis Dipstick    2. Postpartum depression    3. Encounter for postoperative wound check    4. Previous  section                 No follow-ups on file.      Paris Fitzgerald MD    2022  15:49 EST

## 2022-07-01 ENCOUNTER — OFFICE VISIT (OUTPATIENT)
Dept: OBSTETRICS AND GYNECOLOGY | Facility: CLINIC | Age: 37
End: 2022-07-01

## 2022-07-01 VITALS
DIASTOLIC BLOOD PRESSURE: 58 MMHG | BODY MASS INDEX: 38.97 KG/M2 | WEIGHT: 206.4 LBS | HEIGHT: 61 IN | SYSTOLIC BLOOD PRESSURE: 112 MMHG

## 2022-07-01 DIAGNOSIS — N92.6 MISSED MENSES: Primary | ICD-10-CM

## 2022-07-01 DIAGNOSIS — O09.299 HX OF PREECLAMPSIA, PRIOR PREGNANCY, CURRENTLY PREGNANT: ICD-10-CM

## 2022-07-01 DIAGNOSIS — Z98.891 PREVIOUS CESAREAN SECTION: ICD-10-CM

## 2022-07-01 DIAGNOSIS — O09.891 SHORT INTERVAL BETWEEN PREGNANCIES AFFECTING PREGNANCY IN FIRST TRIMESTER, ANTEPARTUM: ICD-10-CM

## 2022-07-01 DIAGNOSIS — Z86.59 HISTORY OF DEPRESSION: ICD-10-CM

## 2022-07-01 DIAGNOSIS — O20.9 BLEEDING IN EARLY PREGNANCY: ICD-10-CM

## 2022-07-01 LAB
B-HCG UR QL: POSITIVE
EXPIRATION DATE: ABNORMAL
INTERNAL NEGATIVE CONTROL: ABNORMAL
INTERNAL POSITIVE CONTROL: ABNORMAL
Lab: ABNORMAL

## 2022-07-01 PROCEDURE — 99214 OFFICE O/P EST MOD 30 MIN: CPT | Performed by: NURSE PRACTITIONER

## 2022-07-01 PROCEDURE — 81025 URINE PREGNANCY TEST: CPT | Performed by: NURSE PRACTITIONER

## 2022-07-01 RX ORDER — PNV NO.95/FERROUS FUM/FOLIC AC 28MG-0.8MG
1 TABLET ORAL DAILY
Qty: 30 TABLET | Refills: 11 | Status: SHIPPED | OUTPATIENT
Start: 2022-07-01 | End: 2023-03-28

## 2022-07-01 NOTE — PROGRESS NOTES
Confirmation of pregnancy     Chief Complaint   Patient presents with   • Menstrual Problem     Confirmation of pregnancy LMP: 22         Theresa ROA is being seen today for evaluation of absence of menses. Due to her period being late, she tested for pregnancy and had + home UPT. She is a 37 y.o. . This problem is new to me, the examiner.       LNMP: 22  Confident with date: Yes  Taking prenatal vitamins: No. Needs RX: Yes  Planned pregnancy: No  Prior obstetric issues, potential pregnancy concerns: h/o , h/o GDM and preeclampsia   Family history of genetic issues (includes FOB): denies  Varicella Hx: disease as child  Flu vaccine: has not had  COVID 19 vaccine: has not had. Booster vaccine: has not had  History of STDs: denies HSV  Current medications: denies   Last pap smear:   Smoker: No  Drug or alcohol abuse: No  H/O physical, emotional or sexual abuse: denies  H/O mental health disorder: H/O depression and anxiety   Prior testing for Cystic Fibrosis Carrier or Sickle Cell Trait- CF neg, SMA +   Prepregnancy BMI - Body mass index is 39 kg/m².    Past Medical History:   Diagnosis Date   • Anxiety    • Depression    • Gestational hypertension        Past Surgical History:   Procedure Laterality Date   •  SECTION     •  SECTION N/A 2022    Procedure:  SECTION REPEAT;  Surgeon: Paris Fitzgerald MD;  Location: [facility] LABOR DELIVERY;  Service: Obstetrics/Gynecology;  Laterality: N/A;   • CHOLECYSTECTOMY     • TUBAL ABDOMINAL LIGATION      and reversal       No current outpatient medications on file.    No Known Allergies    Social History     Socioeconomic History   • Marital status:    Tobacco Use   • Smoking status: Never Smoker   • Smokeless tobacco: Never Used   Vaping Use   • Vaping Use: Never used   Substance and Sexual Activity   • Alcohol use: Never   • Drug use: Never   • Sexual activity: Yes     Partners: Male       Family History  "  Problem Relation Age of Onset   • Depression Mother    • Anxiety disorder Mother    • Diabetes Father    • Hypertension Father    • Heart disease Father        Review of systems     Review of Systems   Constitutional: Positive for fatigue.   Respiratory: Negative.    Cardiovascular: Negative.    Gastrointestinal: Negative.    Genitourinary: Positive for menstrual problem and vaginal bleeding.   Musculoskeletal: Negative.    Skin: Negative.    Neurological: Negative.    Psychiatric/Behavioral: Negative.        Objective    /58   Ht 154.9 cm (61\")   Wt 93.6 kg (206 lb 6.4 oz)   LMP 05/05/2022 (Exact Date)   Breastfeeding No   BMI 39.00 kg/m²     Physical Exam  Vitals and nursing note reviewed.   Constitutional:       Appearance: Normal appearance.   Musculoskeletal:         General: Normal range of motion.   Skin:     General: Skin is warm and dry.   Neurological:      General: No focal deficit present.      Mental Status: She is alert and oriented to person, place, and time.   Psychiatric:         Mood and Affect: Mood normal.         Behavior: Behavior normal.         Assessment/Plan      1. Missed menses: + UPT in office. LNMP 5/5/22 = 8.1 week EGA with an EDC=2/9/23. Oriented to practice.     2. Pregnancy: Disc importance of regular prenatal care. Enc PNV daily. Counseled on providers and on call phone. Disc Tylenol products are ok and encouraged no ibuprofen or ASA in pregnancy.  Disc exercise in pregnancy, diet, expected weight gain, etc. Enc no use of tobacco, vaping, drugs, or alcohol during pregnancy. Rev warn s/s of SAB.     3. Labs: Pt counseled on genetic screening, Quad screen, AFP, and NIPS.     4.  Body mass index is 39 kg/m². Obese women with a pre-pregnancy BMI of 30+ should strive to gain approx 11-20 pounds for the entire pregnancy.     5.   Smoker- non smoker     6.   COVID19 precautions were reviewed with the patient. Continue to encourage social distancing, wearing a mask, and good " hand hygiene.  I wore a mask, protective eye wear, and gloves during this patient encounter.  Patient also wearing a surgical mask and social distancing was observed. Hand hygeine performed before and after seeing the patient. Also encouraged COVID booster vaccine at 6 month interval from last COVID vaccine. Info provided on Covid vaccine:  Has not had     7.  Flu vaccine. Encouraged the flu vaccine during pregnancy. Discuss normal physiological changes during pregnancy increase the susceptibility of the flu virus and increase the risk of severe illness for the pregnant woman. Disc flu can be harmful to the unborn baby as well. Enc the flu vaccine. Disc with patient that getting the flu vaccine is the first and most important step in protecting against the flu. Flu vaccines given during pregnancy help protect both the mother and the baby. Getting the flu vaccine during pregnancy also helps protect the  from flu illness for several months after their birth, when then are too young to get vaccinated. Also disc the importance of good hand hygiene and avoiding people who are sick. The patient declines the flu vaccine.     8.  Short interval between pregnancies- S/P RTLCS .     9.  H/O mild preeclampsia- Delivered @ 37 weeks with last pregnancy. Start Baby ASA @ 12 weeks. Check CBC, CMP and baseline 24 hr urine with new OB labs.     10. Bleeding in early pregnancy- Rh +. Instructed on pelvic rest and light duty at work. May need quant depending on US findings.     11. H/O depression and anxiety- No current meds. No counselor.     12.  H/O GDM?- Pt thinks with first child but uncertain. Check HgbA1C with new OB labs, may need early 2hr GTT.     13. SMA carrier- FOB neg, tested last pregnancy     All questions answered.         Encounter Diagnoses   Name Primary?   • Missed menses Yes       Diagnoses and all orders for this visit:    Missed menses  -     POC Pregnancy, Urine    I spent 30 minutes caring for  Theresa on this date of service. This time includes time spent by me in the following activities: preparing for the visit, reviewing tests, obtaining and/or reviewing a separately obtained history, performing a medically appropriate examination and/or evaluation, counseling and educating the patient/family/caregiver, ordering medications, tests, or procedures, referring and communicating with other health care professionals, documenting information in the medical record and care coordination    Pt going to [place] office @ 1030 for US d/t bleeding.     RTO in 2 weeks for new OB exam, labs and dating ultrasound.     Sarita Givens, THAIS  7/1/2022  09:14 EDT

## 2022-07-06 ENCOUNTER — OFFICE VISIT (OUTPATIENT)
Dept: OBSTETRICS AND GYNECOLOGY | Facility: CLINIC | Age: 37
End: 2022-07-06

## 2022-07-06 VITALS
BODY MASS INDEX: 40.02 KG/M2 | DIASTOLIC BLOOD PRESSURE: 80 MMHG | HEIGHT: 61 IN | SYSTOLIC BLOOD PRESSURE: 122 MMHG | WEIGHT: 212 LBS

## 2022-07-06 DIAGNOSIS — O20.0 THREATENED ABORTION: Primary | ICD-10-CM

## 2022-07-06 PROCEDURE — 99213 OFFICE O/P EST LOW 20 MIN: CPT | Performed by: OBSTETRICS & GYNECOLOGY

## 2022-07-07 LAB
BASOPHILS # BLD AUTO: 0 X10E3/UL (ref 0–0.2)
BASOPHILS NFR BLD AUTO: 0 %
EOSINOPHIL # BLD AUTO: 0.2 X10E3/UL (ref 0–0.4)
EOSINOPHIL NFR BLD AUTO: 3 %
ERYTHROCYTE [DISTWIDTH] IN BLOOD BY AUTOMATED COUNT: 13.1 % (ref 11.7–15.4)
HCG INTACT+B SERPL-ACNC: NORMAL MIU/ML
HCT VFR BLD AUTO: 36.1 % (ref 34–46.6)
HGB BLD-MCNC: 11.8 G/DL (ref 11.1–15.9)
IMM GRANULOCYTES # BLD AUTO: 0 X10E3/UL (ref 0–0.1)
IMM GRANULOCYTES NFR BLD AUTO: 0 %
LYMPHOCYTES # BLD AUTO: 2.2 X10E3/UL (ref 0.7–3.1)
LYMPHOCYTES NFR BLD AUTO: 25 %
MCH RBC QN AUTO: 27.4 PG (ref 26.6–33)
MCHC RBC AUTO-ENTMCNC: 32.7 G/DL (ref 31.5–35.7)
MCV RBC AUTO: 84 FL (ref 79–97)
MONOCYTES # BLD AUTO: 0.6 X10E3/UL (ref 0.1–0.9)
MONOCYTES NFR BLD AUTO: 6 %
NEUTROPHILS # BLD AUTO: 5.8 X10E3/UL (ref 1.4–7)
NEUTROPHILS NFR BLD AUTO: 66 %
PLATELET # BLD AUTO: 283 X10E3/UL (ref 150–450)
RBC # BLD AUTO: 4.3 X10E6/UL (ref 3.77–5.28)
WBC # BLD AUTO: 8.9 X10E3/UL (ref 3.4–10.8)

## 2022-07-07 NOTE — PROGRESS NOTES
"      Theresa ROA is a 37 y.o. patient who presents for follow up of   Chief Complaint   Patient presents with   • Follow-up     U/S       HPI 37-year-old -0-1-3 at 8 weeks and 6 days here for follow-up for vaginal bleeding first trimester.  She has been bleeding for the last week.  Some days are light and some days are heavier.  She describes the bleeding as varying from bright red to dark maroon.  She has mild pelvic cramping.  She is here for an ultrasound today.  She denies any orthostatic symptoms.    The following portions of the patient's history were reviewed and updated as appropriate: allergies, current medications and problem list.    Review of Systems   Constitutional: Negative for appetite change, fever and unexpected weight change.   HENT: Negative for congestion and sore throat.    Respiratory: Negative for cough and shortness of breath.    Cardiovascular: Negative for chest pain and palpitations.   Gastrointestinal: Negative for abdominal distention, abdominal pain, constipation, diarrhea, nausea and vomiting.   Endocrine: Negative.    Genitourinary: Positive for menstrual problem, pelvic pain (mild cramping) and vaginal bleeding. Negative for dyspareunia and vaginal discharge.   Skin: Negative.    Neurological: Negative for dizziness and syncope.   Hematological: Negative.    Psychiatric/Behavioral: Negative for dysphoric mood and sleep disturbance. The patient is not nervous/anxious.        /80   Ht 154.9 cm (61\")   Wt 96.2 kg (212 lb)   LMP 2022 (Exact Date)   BMI 40.06 kg/m²     Physical Exam  Vitals and nursing note reviewed.   Constitutional:       Appearance: She is well-developed.   HENT:      Head: Normocephalic and atraumatic.   Pulmonary:      Effort: Pulmonary effort is normal. No respiratory distress.   Abdominal:      General: There is no distension.      Palpations: Abdomen is soft. There is no mass.      Tenderness: There is no abdominal tenderness. There is no " guarding or rebound.   Musculoskeletal:         General: Normal range of motion.   Skin:     General: Skin is warm and dry.   Neurological:      Mental Status: She is alert and oriented to person, place, and time.   Psychiatric:         Behavior: Behavior normal.         Thought Content: Thought content normal.         Judgment: Judgment normal.     Ultrasound shows live, viable IUP with cardiac activity.  There is a subchorionic hemorrhage measuring 3 x 2 x 3 cm seen.  Both ovaries are normal.  No free fluid.      Assessment/Plan    Diagnoses and all orders for this visit:    1. Threatened  (Primary)  -     HCG, B-subunit, Quantitative  -     CBC & Differential    Ultrasound suggests subchorionic hemorrhage.  Patient is Rh+.  Viable IUP seen.  Check hemoglobin and quantitative hCG.  SAB warnings given.  Rescan in 1 week.    Return in about 1 week (around 2022) for US, TV (f/u bleeding), NOB + labs.      Murray Sheikh MD  2022  07:19 EDT

## 2022-07-13 ENCOUNTER — INITIAL PRENATAL (OUTPATIENT)
Dept: OBSTETRICS AND GYNECOLOGY | Facility: CLINIC | Age: 37
End: 2022-07-13

## 2022-07-13 VITALS — BODY MASS INDEX: 39.19 KG/M2 | WEIGHT: 207.4 LBS | DIASTOLIC BLOOD PRESSURE: 74 MMHG | SYSTOLIC BLOOD PRESSURE: 126 MMHG

## 2022-07-13 DIAGNOSIS — O20.9 BLEEDING IN EARLY PREGNANCY: ICD-10-CM

## 2022-07-13 DIAGNOSIS — N92.6 MISSED MENSES: ICD-10-CM

## 2022-07-13 DIAGNOSIS — Z01.419 PAP SMEAR, LOW-RISK: Primary | ICD-10-CM

## 2022-07-13 DIAGNOSIS — O09.299 HX OF PREECLAMPSIA, PRIOR PREGNANCY, CURRENTLY PREGNANT: ICD-10-CM

## 2022-07-13 DIAGNOSIS — Z36.9 ENCOUNTER FOR ANTENATAL SCREENING, UNSPECIFIED: ICD-10-CM

## 2022-07-13 DIAGNOSIS — Z34.90 PREGNANCY, UNSPECIFIED GESTATIONAL AGE: ICD-10-CM

## 2022-07-13 DIAGNOSIS — Z14.8 GENETIC CARRIER: ICD-10-CM

## 2022-07-13 DIAGNOSIS — Z98.890 HISTORY OF REVERSAL OF TUBAL LIGATION: ICD-10-CM

## 2022-07-13 DIAGNOSIS — Z11.51 ENCOUNTER FOR SCREENING FOR HUMAN PAPILLOMAVIRUS (HPV): ICD-10-CM

## 2022-07-13 DIAGNOSIS — Z86.59 HISTORY OF DEPRESSION: ICD-10-CM

## 2022-07-13 DIAGNOSIS — Z98.891 PREVIOUS CESAREAN SECTION: ICD-10-CM

## 2022-07-13 DIAGNOSIS — O09.891 SHORT INTERVAL BETWEEN PREGNANCIES AFFECTING PREGNANCY IN FIRST TRIMESTER, ANTEPARTUM: ICD-10-CM

## 2022-07-13 LAB
GLUCOSE UR STRIP-MCNC: NEGATIVE MG/DL
PROT UR STRIP-MCNC: NEGATIVE MG/DL

## 2022-07-13 PROCEDURE — 99214 OFFICE O/P EST MOD 30 MIN: CPT | Performed by: OBSTETRICS & GYNECOLOGY

## 2022-07-14 LAB
ABO GROUP BLD: NORMAL
AMPHETAMINES UR QL SCN: NEGATIVE NG/ML
BACTERIA UR CULT: NO GROWTH
BACTERIA UR CULT: NORMAL
BARBITURATES UR QL SCN: NEGATIVE NG/ML
BASOPHILS # BLD AUTO: 0 X10E3/UL (ref 0–0.2)
BASOPHILS NFR BLD AUTO: 0 %
BENZODIAZ UR QL SCN: NEGATIVE NG/ML
BLD GP AB SCN SERPL QL: NEGATIVE
BZE UR QL SCN: NEGATIVE NG/ML
CANNABINOIDS UR QL SCN: NEGATIVE NG/ML
CREAT UR-MCNC: 28.6 MG/DL (ref 20–300)
EOSINOPHIL # BLD AUTO: 0.2 X10E3/UL (ref 0–0.4)
EOSINOPHIL NFR BLD AUTO: 2 %
ERYTHROCYTE [DISTWIDTH] IN BLOOD BY AUTOMATED COUNT: 13.2 % (ref 11.7–15.4)
HBA1C MFR BLD: 5.5 % (ref 4.8–5.6)
HBV SURFACE AG SERPL QL IA: NEGATIVE
HCT VFR BLD AUTO: 40.4 % (ref 34–46.6)
HCV AB S/CO SERPL IA: 0.1 S/CO RATIO (ref 0–0.9)
HGB A MFR BLD ELPH: 97.3 % (ref 96.4–98.8)
HGB A2 MFR BLD ELPH: 2.7 % (ref 1.8–3.2)
HGB BLD-MCNC: 13.1 G/DL (ref 11.1–15.9)
HGB F MFR BLD ELPH: 0 % (ref 0–2)
HGB FRACT BLD-IMP: NORMAL
HGB S MFR BLD ELPH: 0 %
HIV 1+2 AB+HIV1 P24 AG SERPL QL IA: NON REACTIVE
IMM GRANULOCYTES # BLD AUTO: 0 X10E3/UL (ref 0–0.1)
IMM GRANULOCYTES NFR BLD AUTO: 0 %
LABORATORY COMMENT REPORT: NORMAL
LYMPHOCYTES # BLD AUTO: 2.1 X10E3/UL (ref 0.7–3.1)
LYMPHOCYTES NFR BLD AUTO: 22 %
MCH RBC QN AUTO: 27.6 PG (ref 26.6–33)
MCHC RBC AUTO-ENTMCNC: 32.4 G/DL (ref 31.5–35.7)
MCV RBC AUTO: 85 FL (ref 79–97)
METHADONE UR QL SCN: NEGATIVE NG/ML
MONOCYTES # BLD AUTO: 0.5 X10E3/UL (ref 0.1–0.9)
MONOCYTES NFR BLD AUTO: 5 %
NEUTROPHILS # BLD AUTO: 6.4 X10E3/UL (ref 1.4–7)
NEUTROPHILS NFR BLD AUTO: 71 %
OPIATES UR QL SCN: NEGATIVE NG/ML
OXYCODONE+OXYMORPHONE UR QL SCN: NEGATIVE NG/ML
PCP UR QL: NEGATIVE NG/ML
PH UR: 5.8 [PH] (ref 4.5–8.9)
PLATELET # BLD AUTO: 308 X10E3/UL (ref 150–450)
PROPOXYPH UR QL SCN: NEGATIVE NG/ML
RBC # BLD AUTO: 4.74 X10E6/UL (ref 3.77–5.28)
RH BLD: POSITIVE
RPR SER QL: NON REACTIVE
RUBV IGG SERPL IA-ACNC: 12.3 INDEX
VZV IGG SER IA-ACNC: 2340 INDEX
WBC # BLD AUTO: 9.2 X10E3/UL (ref 3.4–10.8)

## 2022-07-15 NOTE — PROGRESS NOTES
PIP= hemoglobin electrophoresis is normal.  She is still immune to rubella and varicella..  Hepatitis B, hepatitis C, HIV and syphilis testing all normal.  Her blood type is a positive.  Her hemoglobin is normal.  Urine culture is normal.  Her hemoglobin A1c is normal at 5.5.

## 2022-07-18 LAB
C TRACH RRNA CVX QL NAA+PROBE: NEGATIVE
CYTOLOGIST CVX/VAG CYTO: ABNORMAL
CYTOLOGY CVX/VAG DOC CYTO: ABNORMAL
CYTOLOGY CVX/VAG DOC THIN PREP: ABNORMAL
DX ICD CODE: ABNORMAL
HIV 1 & 2 AB SER-IMP: ABNORMAL
HPV I/H RISK 4 DNA CVX QL PROBE+SIG AMP: POSITIVE
HPV16 DNA CVX QL PROBE+SIG AMP: NEGATIVE
HPV18+45 E6+E7 MRNA CVX QL NAA+PROBE: NEGATIVE
N GONORRHOEA RRNA CVX QL NAA+PROBE: NEGATIVE
OTHER STN SPEC: ABNORMAL
STAT OF ADQ CVX/VAG CYTO-IMP: ABNORMAL
T VAGINALIS RRNA SPEC QL NAA+PROBE: NEGATIVE

## 2022-07-23 PROBLEM — Z87.59 H/O ONE MISCARRIAGE: Status: RESOLVED | Noted: 2021-05-05 | Resolved: 2022-07-23

## 2022-07-23 PROBLEM — Z87.59 HISTORY OF GESTATIONAL HYPERTENSION: Status: RESOLVED | Noted: 2021-07-12 | Resolved: 2022-07-23

## 2022-07-23 PROBLEM — O20.0 THREATENED ABORTION: Status: RESOLVED | Noted: 2021-06-22 | Resolved: 2022-07-23

## 2022-07-23 PROBLEM — O23.42 URINARY TRACT INFECTION IN MOTHER DURING SECOND TRIMESTER OF PREGNANCY: Status: RESOLVED | Noted: 2021-08-29 | Resolved: 2022-07-23

## 2022-07-23 PROBLEM — N88.8 CERVICAL FUNNELING: Status: RESOLVED | Noted: 2021-08-29 | Resolved: 2022-07-23

## 2022-07-23 PROBLEM — O26.849 FETAL SIZE INCONSISTENT WITH DATES: Status: RESOLVED | Noted: 2021-12-15 | Resolved: 2022-07-23

## 2022-07-23 PROBLEM — O14.00 ANTEPARTUM MILD PREECLAMPSIA: Status: RESOLVED | Noted: 2021-12-22 | Resolved: 2022-07-23

## 2022-07-23 PROBLEM — O09.529 ANTEPARTUM MULTIGRAVIDA OF ADVANCED MATERNAL AGE: Status: RESOLVED | Noted: 2021-06-22 | Resolved: 2022-07-23

## 2022-07-23 PROBLEM — O14.03 MILD PREECLAMPSIA, THIRD TRIMESTER: Status: RESOLVED | Noted: 2022-01-07 | Resolved: 2022-07-23

## 2022-07-23 PROBLEM — N89.8 VAGINAL DISCHARGE: Status: RESOLVED | Noted: 2021-08-29 | Resolved: 2022-07-23

## 2022-07-23 PROBLEM — Z98.891 S/P CESAREAN SECTION: Status: RESOLVED | Noted: 2022-01-07 | Resolved: 2022-07-23

## 2022-07-23 NOTE — PROGRESS NOTES
Initial ob visit     Chief Complaint   Patient presents with   • Initial Prenatal Visit       Theresa ROA is being seen today for her first obstetrical visit.  She is a 37 y.o.    9w6d gestation.  Her US does confirm her MARCUS of 2023. She has continued to have spotting off and on.  She has had 2 previous C/S and a tubal reversal. She just delivered on 2022 at 37 weeks due to mild preeclampsia. She had a child die at age 6.     OB History        6    Para   4    Term   4       0    AB   1    Living   3       SAB   1    IAB        Ectopic        Molar        Multiple   0    Live Births   4          Obstetric Comments   - , term, 6.8#  2002- , term, 6.12#  2013- 1 LTCS, term 7.0 # - due to BP per pt  2022- RLTCS , 37 weeks, mild preeclampsia             Menarche: 13  Current contraception: none  History of abnormal Pap smear: no  History of abnormal mammogram: no  Family history of uterine, colon or ovarian cancer: no  Family history of breast cancer: no  H/o STDs: none  Last pap: ?   Gardasil:missed  NAYAN: none  Birth defects: none  Mental retardation: none    Current obstetric complaints : vaginal bleeding   Prior obstetric issues, potential pregnancy concerns: AMA, 2 previous C/S, tubal reversal, short interval b/t pregnancies, h/o mild preeclampsia, ? GDMA  Family history of genetic issues (includes FOB): none  Prior infections concerning in pregnancy (Rash, fever in last 2 weeks): none  Varicella Hx -uncertain  Flu vaccine- declines  Prior testing for Cystic Fibrosis Carrier or Sickle Cell Trait- none  Prepregnancy BMI - Body mass index is 39.19 kg/m².    Past Medical History:   Diagnosis Date   • Anxiety    • Depression    • Gestational hypertension    • Preeclampsia        Past Surgical History:   Procedure Laterality Date   •  SECTION     •  SECTION N/A 2022    Procedure:  SECTION REPEAT;  Surgeon: Paris Fitzgerald MD;   Location:  [facility] LABOR DELIVERY;  Service: Obstetrics/Gynecology;  Laterality: N/A;   • CHOLECYSTECTOMY     • TUBAL ABDOMINAL LIGATION      and reversal   • TUBAL REANASTOMOSIS           Current Outpatient Medications:   •  Prenatal Vit-Fe Fumarate-FA (Prenatal Vitamin) 27-0.8 MG tablet, Take 1 tablet by mouth Daily., Disp: 30 tablet, Rfl: 11    No Known Allergies    Social History     Socioeconomic History   • Marital status:    Tobacco Use   • Smoking status: Never Smoker   • Smokeless tobacco: Never Used   Vaping Use   • Vaping Use: Never used   Substance and Sexual Activity   • Alcohol use: Never   • Drug use: Never   • Sexual activity: Yes     Partners: Male     Birth control/protection: None       Family History   Problem Relation Age of Onset   • Diabetes Father    • Hypertension Father    • Heart disease Father    • Depression Mother    • Anxiety disorder Mother    • Breast cancer Neg Hx    • Ovarian cancer Neg Hx    • Uterine cancer Neg Hx    • Colon cancer Neg Hx    • Deep vein thrombosis Neg Hx    • Pulmonary embolism Neg Hx        Review of systems     Constitutional : Nausea, fatigue minimal   : Vaginal bleeding, cramping daily  Breast Tenderness : none  Pertinent items are noted in HPI.     Objective    /74   Wt 94.1 kg (207 lb 6.4 oz)   LMP 05/05/2022 (Exact Date)   BMI 39.19 kg/m²       General Appearance:    Alert, cooperative, in no acute distress, habitus normal   Head:    Normocephalic, without obvious abnormality, atraumatic   Eyes:            Lids and lashes normal, conjunctivae and sclerae normal, no icterus, no pallor, corneas clear   Ears:    Ears appear intact with no abnormalities noted       Neck:   No adenopathy, supple, trachea midline, no thyromegaly   Back:     No kyphosis present, no scoliosis present,                       Lungs:     Clear to auscultation,respirations regular, even and     unlabored    Heart:    Regular rhythm and normal rate, normal S1 and S2, no        murmur, no gallop, no rub, no click   Breast Exam:    No masses, No nipple discharge   Abdomen:     Normal bowel sounds, no masses, no organomegaly, soft,    non-tender, non-distended, no guarding, no rebound                tenderness   Genitalia:    Vulva - BUS-WNL, NEFG    Vagina - No discharge, No bleeding    Cervix - No Lesions, closed     Uterus - Consistent with 10 weeks    Adnexa - No mass, NT    Pelvimetry - clinically adequate, gynecoid pelvis     Extremities:   Moves all extremities well, no edema, no cyanosis, no        redness   Pulses:   Pulses palpable and equal bilaterally   Skin:   No bleeding, bruising or rash   Lymph nodes:   No palpable adenopathy   Neurologic:   Sensation intact, A&O times 3          Assessment    1) 36 yo G6 P 4013 with pregnancy at 9w6d- US today shows a 9.6 week embryo with . MARCUS= 2/9/2023. There is a complex area near the cervix measuring 3.5 x 2.9 x 2.4 cm. US compared to her scan on 7/6/2022.     2) Threatened AB- RH +. Subchorionic hemorrhage seen near cervix.     3)We also discussed the structure of the practice and recommend that patients see all providers as we do not know who will do her delivery. The timing of appointments and the after hours call line use were also reviewed.     4)Short interval between pregnancies- S/P RTLCS 1/22.      5)  H/O mild preeclampsia- Delivered @ 37 weeks with last pregnancy. Start Baby ASA @ 12 weeks. Check CBC, CMP and baseline 24 hr urine.     6). H/O depression and anxiety- No current meds. No counselor.      7)  H/O GDM?- Pt thinks with first child but uncertain. Check HgbA1C with new OB labs, may need early 2hr GTT.      8). SMA carrier- FOB neg, tested last pregnancy     9) AMA- check NIPT in 2 weeks.     10) RTO 2 weeks US viability, NIPT and OBT.   Paris Fitzgerald MD    17:54 EDT   7/13/2022

## 2022-07-26 ENCOUNTER — ROUTINE PRENATAL (OUTPATIENT)
Dept: OBSTETRICS AND GYNECOLOGY | Facility: CLINIC | Age: 37
End: 2022-07-26

## 2022-07-26 VITALS — WEIGHT: 213 LBS | DIASTOLIC BLOOD PRESSURE: 70 MMHG | BODY MASS INDEX: 40.25 KG/M2 | SYSTOLIC BLOOD PRESSURE: 130 MMHG

## 2022-07-26 DIAGNOSIS — Z98.891 PREVIOUS CESAREAN SECTION: ICD-10-CM

## 2022-07-26 DIAGNOSIS — O09.521 MULTIGRAVIDA OF ADVANCED MATERNAL AGE IN FIRST TRIMESTER: ICD-10-CM

## 2022-07-26 DIAGNOSIS — O09.299 HX OF PREECLAMPSIA, PRIOR PREGNANCY, CURRENTLY PREGNANT: ICD-10-CM

## 2022-07-26 DIAGNOSIS — Z3A.11 11 WEEKS GESTATION OF PREGNANCY: Primary | ICD-10-CM

## 2022-07-26 LAB
GLUCOSE UR STRIP-MCNC: NEGATIVE MG/DL
PROT UR STRIP-MCNC: NEGATIVE MG/DL

## 2022-07-26 PROCEDURE — 99213 OFFICE O/P EST LOW 20 MIN: CPT | Performed by: OBSTETRICS & GYNECOLOGY

## 2022-07-26 NOTE — PROGRESS NOTES
OB follow up     Theresa ROA is a 37 y.o.  11w5d being seen today for her obstetrical visit.  Patient reports no bleeding, no contractions and no leaking. Fetal movement: absent.  Prenatal care complicated by advanced maternal age, history of previous  and a history of preeclampsia in a previous pregnancy.  She presents today for prenatal visit and ultrasound for viability.    Review of Systems  No bleeding, No cramping/contractions     /70   Wt 96.6 kg (213 lb)   LMP 2022 (Exact Date)   BMI 40.25 kg/m²     FHT: present BPM   Uterine Size:     Live viable inman intrauterine pregnancy with cardiac activity.  Uterus is anteverted.  There is a small 3.2 x 2.7 cm complex area seen posterior to the cervix in the cul-de-sac.  May be cul-de-sac fluid.  Both adnexa are clear.    Assessment/Plan:    1) 37 y.o.  -pregnancy at 11w5d    2)   Encounter Diagnoses   Name Primary?   • 11 weeks gestation of pregnancy Yes   • Multigravida of advanced maternal age in first trimester    • Previous  section    • Hx of preeclampsia, prior pregnancy, currently pregnant    Viable pregnancy confirmed.  Plan repeat low-transverse  section at 39 weeks.  Desires tubal sterilization at this delivery.    3) Reviewed this stage of pregnancy  4) Problem list updated     Return in about 4 weeks (around 2022) for OB Tummy.      Murray Sheikh MD    2022  17:08 EDT

## 2022-08-01 LAB
CFDNA.FET/CFDNA.TOTAL SFR FETUS: ABNORMAL %
CITATION REF LAB TEST: ABNORMAL
FET 13+18+21+X+Y ANEUP PLAS.CFDNA: ABNORMAL
FET CHR 21 TS PLAS.CFDNA QL: ABNORMAL
FET SEX PLAS.CFDNA DOSAGE CFDNA: ABNORMAL
FET TS 13 RISK PLAS.CFDNA QL: ABNORMAL
FET TS 18 RISK WBC.DNA+CFDNA QL: ABNORMAL
GA EST FROM CONCEPTION DATE: ABNORMAL D
GESTATIONAL AGE > 9:: ABNORMAL
LAB DIRECTOR NAME PROVIDER: ABNORMAL
LAB DIRECTOR NAME PROVIDER: ABNORMAL
LABORATORY COMMENT REPORT: ABNORMAL
LIMITATIONS OF THE TEST: ABNORMAL
NEGATIVE PREDICTIVE VALUE: ABNORMAL
NOTE: ABNORMAL
PERFORMANCE CHARACTERISTICS: ABNORMAL
POSITIVE PREDICTIVE VALUE: ABNORMAL
REF LAB TEST METHOD: ABNORMAL
TEST PERFORMANCE INFO SPEC: ABNORMAL

## 2022-08-02 ENCOUNTER — TELEPHONE (OUTPATIENT)
Dept: OBSTETRICS AND GYNECOLOGY | Facility: CLINIC | Age: 37
End: 2022-08-02

## 2022-08-02 NOTE — TELEPHONE ENCOUNTER
Provider: DR JUSTIN     Caller: ABHINAV ROA     Phone Number: [phone] OK TO [facility]     Reason for Call:PT REQUESTING A PHONE CALL BACK REGARDING LAB WORK DONE 7/26/22

## 2022-08-08 ENCOUNTER — TELEPHONE (OUTPATIENT)
Dept: OBSTETRICS AND GYNECOLOGY | Facility: CLINIC | Age: 37
End: 2022-08-08

## 2022-08-08 NOTE — TELEPHONE ENCOUNTER
Caller: ABHINAV ROA    Relationship: SELF    Best call back number: [phone]  OK TO CALL ANYTIME/M    Caller requesting test results: YES    What test was performed: [id number] PLUS Core    When was the test performed: 8/3/22        Additional notes: PLEASE CONTACT PT WITH RESULTS ONCE THEY ARE REVIEWED

## 2022-08-10 ENCOUNTER — TELEPHONE (OUTPATIENT)
Dept: OBSTETRICS AND GYNECOLOGY | Facility: CLINIC | Age: 37
End: 2022-08-10

## 2022-08-10 NOTE — TELEPHONE ENCOUNTER
Caller: Theresa ROA    Relationship: Self    Best call back number: [phone]    PT CALLED IN TO REQUEST LAB RESULTS.     What test was performed: [id number] PLUS Core - Blood,    When was the test performed: 08/03

## 2022-08-16 ENCOUNTER — ROUTINE PRENATAL (OUTPATIENT)
Dept: OBSTETRICS AND GYNECOLOGY | Facility: CLINIC | Age: 37
End: 2022-08-16

## 2022-08-16 VITALS — SYSTOLIC BLOOD PRESSURE: 122 MMHG | WEIGHT: 213 LBS | BODY MASS INDEX: 40.25 KG/M2 | DIASTOLIC BLOOD PRESSURE: 70 MMHG

## 2022-08-16 DIAGNOSIS — O09.299 HX OF PREECLAMPSIA, PRIOR PREGNANCY, CURRENTLY PREGNANT: ICD-10-CM

## 2022-08-16 DIAGNOSIS — O20.9 BLEEDING IN EARLY PREGNANCY: Primary | ICD-10-CM

## 2022-08-16 DIAGNOSIS — D21.9 FIBROIDS: ICD-10-CM

## 2022-08-16 PROCEDURE — 99212 OFFICE O/P EST SF 10 MIN: CPT | Performed by: OBSTETRICS & GYNECOLOGY

## 2022-08-16 NOTE — PROGRESS NOTES
OB follow up     Theresa ROA is a 37 y.o.  14w5d being seen today for her obstetrical visit.  Patient reports no contractions and no leaking. Fetal movement: absent.  37-year-old multigravid patient at 14-5/7 weeks with bleeding yesterday down to spotting today.  Patient reports she noticed bleeding at her father's  yesterday and was concerned.  She is Rh+.  She denies orthostatic symptoms.  She has had bleeding earlier in this pregnancy as well.    Review of Systems  No bleeding, No cramping/contractions     /70   Wt 96.6 kg (213 lb)   LMP 2022 (Exact Date)   BMI 40.25 kg/m²     FHT:   BPM   Uterine Size:     Pelvic ultrasound was ordered.  Assessment/Plan:    1) 37 y.o.  -pregnancy at 14w5d    2)   Encounter Diagnoses   Name Primary?   • Bleeding in early pregnancy Yes   • Fibroids    • Hx of preeclampsia, prior pregnancy, currently pregnant    Bleeding in early pregnancy.  Rh+ patient.  Ultrasound ordered.  She has that scheduled later today.    3) Reviewed this stage of pregnancy  4) Problem list updated     Return for Next scheduled follow up.      Murray Sheikh MD    2022  14:02 EDT

## 2022-08-22 ENCOUNTER — ROUTINE PRENATAL (OUTPATIENT)
Dept: OBSTETRICS AND GYNECOLOGY | Facility: CLINIC | Age: 37
End: 2022-08-22

## 2022-08-22 VITALS — WEIGHT: 216 LBS | DIASTOLIC BLOOD PRESSURE: 72 MMHG | SYSTOLIC BLOOD PRESSURE: 118 MMHG | BODY MASS INDEX: 40.81 KG/M2

## 2022-08-22 DIAGNOSIS — Z34.92 PRENATAL CARE IN SECOND TRIMESTER: ICD-10-CM

## 2022-08-22 DIAGNOSIS — Z14.8 GENETIC CARRIER: ICD-10-CM

## 2022-08-22 DIAGNOSIS — O09.299 HX OF PREECLAMPSIA, PRIOR PREGNANCY, CURRENTLY PREGNANT: Primary | ICD-10-CM

## 2022-08-22 LAB
GLUCOSE UR STRIP-MCNC: NEGATIVE MG/DL
PROT UR STRIP-MCNC: NEGATIVE MG/DL

## 2022-08-22 PROCEDURE — 99213 OFFICE O/P EST LOW 20 MIN: CPT | Performed by: NURSE PRACTITIONER

## 2022-08-22 NOTE — PROGRESS NOTES
OB follow up     CC:  Here for prenatal follow up    Theresa ROA is a 37 y.o.  15w4d being seen today for her obstetrical visit.  Patient reports continued light spotting  and mild cramping. She is working daily and her job requires her to stand. She had a normal US at last weeks appt. She has questions regarding the NIPT testing. Taking +PNV.     Review of Systems  Genitourinary: neg SROM, or dysuria. + spotting and cramping       /72   Wt 98 kg (216 lb)   LMP 2022 (Exact Date)   BMI 40.81 kg/m²     FHT: 150s  BPM   Uterine Size: size equals dates   Assessment    1) Pregnancy at 15w4d- Desires NIPS and AFP today.     2) s/p  x 2- Plan repeat  @ 39 weeks     3) S/P tubal reversal     4) H/o preeclampsia- Needs baseline 24 hr urine. Is not taking a baby ASA. Plan to start after spotting resolved.      5) NIPS- Cancelled x 2 d/t low DNA. Pt requesting repeat test today.     6) Spotting- Has improved. Post placent on US last week.  Rh +. On pelvic rest. She stands on her feet for work. Reports her spotting and cramping is worse at work. Note given for chair.     7) SMA carrier- FOB neg last pregnancy     8) AMA- Check NIPS today.     9) Increased BMI- Obese women with a pre-pregnancy BMI of 30+ should strive to gain approx 11-20 pounds for the entire pregnancy.    Plan    Continue prenatal vitamins   Reviewed this stage of pregnancy  Problem list updated   Follow up in 4 weeks    THAIS [place]  2022  10:30 EDT

## 2022-08-23 LAB
ALBUMIN SERPL-MCNC: 3.8 G/DL (ref 3.8–4.8)
ALBUMIN/GLOB SERPL: 1.5 {RATIO} (ref 1.2–2.2)
ALP SERPL-CCNC: 52 IU/L (ref 44–121)
ALT SERPL-CCNC: 27 IU/L (ref 0–32)
AST SERPL-CCNC: 12 IU/L (ref 0–40)
BILIRUB SERPL-MCNC: <0.2 MG/DL (ref 0–1.2)
BUN SERPL-MCNC: 14 MG/DL (ref 6–20)
BUN/CREAT SERPL: 26 (ref 9–23)
CALCIUM SERPL-MCNC: 8.8 MG/DL (ref 8.7–10.2)
CHLORIDE SERPL-SCNC: 106 MMOL/L (ref 96–106)
CO2 SERPL-SCNC: 19 MMOL/L (ref 20–29)
CREAT SERPL-MCNC: 0.54 MG/DL (ref 0.57–1)
EGFRCR-CYS SERPLBLD CKD-EPI 2021: 122 ML/MIN/1.73
GLOBULIN SER CALC-MCNC: 2.6 G/DL (ref 1.5–4.5)
GLUCOSE SERPL-MCNC: 87 MG/DL (ref 65–99)
POTASSIUM SERPL-SCNC: 4.5 MMOL/L (ref 3.5–5.2)
PROT SERPL-MCNC: 6.4 G/DL (ref 6–8.5)
SODIUM SERPL-SCNC: 138 MMOL/L (ref 134–144)

## 2022-08-24 LAB
AFP INTERP SERPL-IMP: NORMAL
AFP INTERP SERPL-IMP: NORMAL
AFP MOM SERPL: 1.02
AFP SERPL-MCNC: 27 NG/ML
AGE AT DELIVERY: 37.9 YR
GA METHOD: NORMAL
GA: 15.6 WEEKS
IDDM PATIENT QL: NO
LABORATORY COMMENT REPORT: NORMAL
MULTIPLE PREGNANCY: NO
NEURAL TUBE DEFECT RISK FETUS: NORMAL %
RESULT: NORMAL

## 2022-09-19 ENCOUNTER — ROUTINE PRENATAL (OUTPATIENT)
Dept: OBSTETRICS AND GYNECOLOGY | Facility: CLINIC | Age: 37
End: 2022-09-19

## 2022-09-19 VITALS — SYSTOLIC BLOOD PRESSURE: 132 MMHG | WEIGHT: 215 LBS | BODY MASS INDEX: 40.62 KG/M2 | DIASTOLIC BLOOD PRESSURE: 84 MMHG

## 2022-09-19 DIAGNOSIS — Z34.93 PRENATAL CARE IN THIRD TRIMESTER: Primary | ICD-10-CM

## 2022-09-19 LAB
GLUCOSE UR STRIP-MCNC: NEGATIVE MG/DL
PROT UR STRIP-MCNC: NEGATIVE MG/DL

## 2022-09-19 PROCEDURE — 99214 OFFICE O/P EST MOD 30 MIN: CPT | Performed by: NURSE PRACTITIONER

## 2022-10-12 ENCOUNTER — REFERRAL TRIAGE (OUTPATIENT)
Dept: LABOR AND DELIVERY | Facility: HOSPITAL | Age: 37
End: 2022-10-12

## 2022-10-12 ENCOUNTER — PATIENT OUTREACH (OUTPATIENT)
Dept: LABOR AND DELIVERY | Facility: HOSPITAL | Age: 37
End: 2022-10-12

## 2022-10-12 NOTE — OUTREACH NOTE
[facility]  Unable to Reach       Questions/Answers    Flowsheet Row Responses   Pending Outreach Confirm Patient Interest   Call Attempt First   Outcome Not available   Next Call Attempt Date 10/14/22   Unable to reach comments: VM full, unable to leave message        Mone Valdez, RN  Maternity Nurse Navigator    10/12/2022, 13:23 EDT

## 2022-10-13 ENCOUNTER — PATIENT OUTREACH (OUTPATIENT)
Dept: LABOR AND DELIVERY | Facility: HOSPITAL | Age: 37
End: 2022-10-13

## 2022-10-13 NOTE — OUTREACH NOTE
[facility]  Unable to Reach       Questions/Answers    Flowsheet Row Responses   Pending Outreach Confirm Patient Interest   Call Attempt Second   Outcome No answer/busy   Unable to reach comments: VM full            Monelupe Valdez, RN  Maternity Nurse Navigator    10/13/2022, 15:49 EDT

## 2022-10-27 ENCOUNTER — ROUTINE PRENATAL (OUTPATIENT)
Dept: OBSTETRICS AND GYNECOLOGY | Facility: CLINIC | Age: 37
End: 2022-10-27

## 2022-10-27 VITALS — WEIGHT: 225 LBS | BODY MASS INDEX: 42.51 KG/M2 | SYSTOLIC BLOOD PRESSURE: 118 MMHG | DIASTOLIC BLOOD PRESSURE: 82 MMHG

## 2022-10-27 DIAGNOSIS — Z14.8 GENETIC CARRIER: ICD-10-CM

## 2022-10-27 DIAGNOSIS — O09.529 ANTEPARTUM MULTIGRAVIDA OF ADVANCED MATERNAL AGE: ICD-10-CM

## 2022-10-27 DIAGNOSIS — O34.211 MATERNAL CARE DUE TO LOW TRANSVERSE UTERINE SCAR FROM PREVIOUS CESAREAN DELIVERY: ICD-10-CM

## 2022-10-27 DIAGNOSIS — Z98.890 HISTORY OF REVERSAL OF TUBAL LIGATION: ICD-10-CM

## 2022-10-27 DIAGNOSIS — O09.521 MULTIGRAVIDA OF ADVANCED MATERNAL AGE IN FIRST TRIMESTER: ICD-10-CM

## 2022-10-27 DIAGNOSIS — O09.299 HX OF PREECLAMPSIA, PRIOR PREGNANCY, CURRENTLY PREGNANT: Primary | ICD-10-CM

## 2022-10-27 DIAGNOSIS — Z98.891 PREVIOUS CESAREAN SECTION: ICD-10-CM

## 2022-10-27 DIAGNOSIS — Z3A.25 25 WEEKS GESTATION OF PREGNANCY: ICD-10-CM

## 2022-10-27 DIAGNOSIS — O09.891 SHORT INTERVAL BETWEEN PREGNANCIES AFFECTING PREGNANCY IN FIRST TRIMESTER, ANTEPARTUM: ICD-10-CM

## 2022-10-27 PROBLEM — O20.9 BLEEDING IN EARLY PREGNANCY: Status: RESOLVED | Noted: 2022-07-01 | Resolved: 2022-10-27

## 2022-10-27 LAB
GLUCOSE UR STRIP-MCNC: NEGATIVE MG/DL
PROT UR STRIP-MCNC: NEGATIVE MG/DL

## 2022-10-27 PROCEDURE — 99214 OFFICE O/P EST MOD 30 MIN: CPT | Performed by: STUDENT IN AN ORGANIZED HEALTH CARE EDUCATION/TRAINING PROGRAM

## 2022-10-27 RX ORDER — ASPIRIN 81 MG/1
81 TABLET ORAL DAILY
Qty: 30 TABLET | Refills: 2 | Status: SHIPPED | OUTPATIENT
Start: 2022-10-27 | End: 2023-01-21 | Stop reason: HOSPADM

## 2022-10-28 LAB
ALBUMIN SERPL-MCNC: 3.8 G/DL (ref 3.5–5.2)
ALBUMIN/GLOB SERPL: 1.6 G/DL
ALP SERPL-CCNC: 79 U/L (ref 39–117)
ALT SERPL-CCNC: 16 U/L (ref 1–33)
AST SERPL-CCNC: 10 U/L (ref 1–32)
BILIRUB SERPL-MCNC: 0.2 MG/DL (ref 0–1.2)
BUN SERPL-MCNC: 7 MG/DL (ref 6–20)
BUN/CREAT SERPL: 13.7 (ref 7–25)
CALCIUM SERPL-MCNC: 9.1 MG/DL (ref 8.6–10.5)
CHLORIDE SERPL-SCNC: 104 MMOL/L (ref 98–107)
CO2 SERPL-SCNC: 24 MMOL/L (ref 22–29)
CREAT SERPL-MCNC: 0.51 MG/DL (ref 0.57–1)
EGFRCR SERPLBLD CKD-EPI 2021: 123.5 ML/MIN/1.73
GLOBULIN SER CALC-MCNC: 2.4 GM/DL
GLUCOSE SERPL-MCNC: 98 MG/DL (ref 65–99)
POTASSIUM SERPL-SCNC: 4.4 MMOL/L (ref 3.5–5.2)
PROT SERPL-MCNC: 6.2 G/DL (ref 6–8.5)
SODIUM SERPL-SCNC: 136 MMOL/L (ref 136–145)

## 2022-11-17 ENCOUNTER — ROUTINE PRENATAL (OUTPATIENT)
Dept: OBSTETRICS AND GYNECOLOGY | Facility: CLINIC | Age: 37
End: 2022-11-17

## 2022-11-17 VITALS — SYSTOLIC BLOOD PRESSURE: 128 MMHG | BODY MASS INDEX: 43.08 KG/M2 | DIASTOLIC BLOOD PRESSURE: 88 MMHG | WEIGHT: 228 LBS

## 2022-11-17 DIAGNOSIS — Z3A.28 28 WEEKS GESTATION OF PREGNANCY: ICD-10-CM

## 2022-11-17 DIAGNOSIS — D21.9 FIBROIDS: ICD-10-CM

## 2022-11-17 DIAGNOSIS — O09.299 HX OF PREECLAMPSIA, PRIOR PREGNANCY, CURRENTLY PREGNANT: ICD-10-CM

## 2022-11-17 DIAGNOSIS — O09.529 ANTEPARTUM MULTIGRAVIDA OF ADVANCED MATERNAL AGE: ICD-10-CM

## 2022-11-17 DIAGNOSIS — Z98.890 HISTORY OF REVERSAL OF TUBAL LIGATION: ICD-10-CM

## 2022-11-17 DIAGNOSIS — Z36.9 ENCOUNTER FOR ANTENATAL SCREENING, UNSPECIFIED: Primary | ICD-10-CM

## 2022-11-17 DIAGNOSIS — Z98.891 PREVIOUS CESAREAN SECTION: ICD-10-CM

## 2022-11-17 DIAGNOSIS — Z14.8 GENETIC CARRIER: ICD-10-CM

## 2022-11-17 DIAGNOSIS — O09.891 SHORT INTERVAL BETWEEN PREGNANCIES AFFECTING PREGNANCY IN FIRST TRIMESTER, ANTEPARTUM: ICD-10-CM

## 2022-11-17 DIAGNOSIS — O34.211 MATERNAL CARE DUE TO LOW TRANSVERSE UTERINE SCAR FROM PREVIOUS CESAREAN DELIVERY: ICD-10-CM

## 2022-11-17 LAB
GLUCOSE UR STRIP-MCNC: NEGATIVE MG/DL
PROT UR STRIP-MCNC: NEGATIVE MG/DL

## 2022-11-17 PROCEDURE — 99214 OFFICE O/P EST MOD 30 MIN: CPT | Performed by: STUDENT IN AN ORGANIZED HEALTH CARE EDUCATION/TRAINING PROGRAM

## 2022-11-17 NOTE — PROGRESS NOTES
OB follow up     CC:  Here for prenatal follow up    Theresa ROA is a 37 y.o.  28+ being seen today for her obstetrical visit.  Denies OB complaints. Has not completed her 24hr UA; Is taking ASA. + FM. Completing 2hr this am . Denies s/s of pre-e SF       Review of Systems  Genitourinary: neg SROM, or dysuria      /88   Wt 103 kg (228 lb)   LMP 2022 (Exact Date)   BMI 43.08 kg/m²   Vitals: VSS; AF    General Appearance:  Awake. Alert. Well developed. Well nourished. In no acute distress.    Visual Inspection: ° Abdomen was normal on visual inspection.  Palpation: ° Abdomen was soft. ° Abdominal non-tender.    Uterus: ° Fundal height was normal for gestational age. ° Not tender.  Uterine Adnexae: ° Normal without masses or tenderness.  Neurological:  ° Oriented to time, place, and person.  Skin:  ° General appearance was normal. No bruising or ecchymosis.  Obstetrical: + FM and FCA     Assessment    1) Pregnancy at 28+0- NIPS has been attempted x 2 but cancelled d/t low fetal DNA. Offer quad screen today, pt declines. AFP neg.  US IMP:  VTX. Normal anatomy, male. CL 4.1cm.    2) s/p  x 2- Plan repeat  @ 39 weeks     3) S/P tubal reversal     4) H/o preeclampsia- Has not turned in 24 hr urine and is not taking a baby ASA.   Baby ASA   If concern for GHTN consider RLTCS 37-38wga ( If closer to 37wga would give steroids 36+ wga)        5) NIPS- Cancelled x 2 d/t low DNA.     6) Flu vaccine   ; recommend      7) Tdap ; offer next appt     8) SMA carrier- FOB neg last pregnancy     9) AMA- NIPS was cancelled d/t low fetal DNA x 2. Pt declines Quad today.   NST/BPP @ 36wga     10) Increased BMI- Obese women with a pre-pregnancy BMI of 30+ should strive to gain approx 11-20 pounds for the entire pregnancy.Check growth US 28, 32 and 36 weeks.   Growth 32wga        Plan    Continue prenatal vitamins   Reviewed this stage of pregnancy  Problem list updated   Follow up in 2  weeks; has yet to drop off 24 hr ua       Malick Lombardi, DO  11/17/2022  09:29 EST    I spent >30 minutes caring for Theresa on this date of service. This time includes time spent by me in the following activities: preparing for the visit, reviewing tests, obtaining and/or reviewing a separately obtained history, performing a medically appropriate examination and/or evaluation, counseling and educating the patient/family/caregiver and ordering medications, tests, or procedures

## 2022-11-18 LAB
GLUCOSE 1H P 75 G GLC PO SERPL-MCNC: 154 MG/DL (ref 65–179)
GLUCOSE 2H P 75 G GLC PO SERPL-MCNC: 128 MG/DL (ref 65–154)
GLUCOSE P FAST SERPL-MCNC: 85 MG/DL (ref 65–94)
HCT VFR BLD AUTO: 35.5 % (ref 34–46.6)
HGB BLD-MCNC: 11.8 G/DL (ref 12–15.9)

## 2022-12-01 ENCOUNTER — ROUTINE PRENATAL (OUTPATIENT)
Dept: OBSTETRICS AND GYNECOLOGY | Facility: CLINIC | Age: 37
End: 2022-12-01

## 2022-12-01 VITALS — BODY MASS INDEX: 43.91 KG/M2 | WEIGHT: 232.4 LBS | SYSTOLIC BLOOD PRESSURE: 126 MMHG | DIASTOLIC BLOOD PRESSURE: 74 MMHG

## 2022-12-01 DIAGNOSIS — Z98.891 PREVIOUS CESAREAN SECTION: ICD-10-CM

## 2022-12-01 DIAGNOSIS — Z86.59 HISTORY OF DEPRESSION: ICD-10-CM

## 2022-12-01 DIAGNOSIS — O09.299 HX OF PREECLAMPSIA, PRIOR PREGNANCY, CURRENTLY PREGNANT: ICD-10-CM

## 2022-12-01 DIAGNOSIS — Z14.8 GENETIC CARRIER: Primary | ICD-10-CM

## 2022-12-01 DIAGNOSIS — O34.211 MATERNAL CARE DUE TO LOW TRANSVERSE UTERINE SCAR FROM PREVIOUS CESAREAN DELIVERY: ICD-10-CM

## 2022-12-01 DIAGNOSIS — Z98.890 HISTORY OF REVERSAL OF TUBAL LIGATION: ICD-10-CM

## 2022-12-01 DIAGNOSIS — Z3A.30 30 WEEKS GESTATION OF PREGNANCY: ICD-10-CM

## 2022-12-01 DIAGNOSIS — O09.529 ANTEPARTUM MULTIGRAVIDA OF ADVANCED MATERNAL AGE: ICD-10-CM

## 2022-12-01 DIAGNOSIS — O09.891 SHORT INTERVAL BETWEEN PREGNANCIES AFFECTING PREGNANCY IN FIRST TRIMESTER, ANTEPARTUM: ICD-10-CM

## 2022-12-01 LAB
GLUCOSE UR STRIP-MCNC: NEGATIVE MG/DL
PROT UR STRIP-MCNC: NEGATIVE MG/DL

## 2022-12-01 PROCEDURE — 99214 OFFICE O/P EST MOD 30 MIN: CPT | Performed by: STUDENT IN AN ORGANIZED HEALTH CARE EDUCATION/TRAINING PROGRAM

## 2022-12-01 RX ORDER — LORATADINE 10 MG/1
10 TABLET ORAL DAILY
Qty: 30 TABLET | Refills: 2 | Status: SHIPPED | OUTPATIENT
Start: 2022-12-01 | End: 2022-12-29

## 2022-12-01 NOTE — PROGRESS NOTES
OB follow up     CC:  Here for prenatal follow up    Theresa ROA is a 37 y.o.  30+0 being seen today for her obstetrical visit.  Denies OB complaints; rash on her chest; no blisters or bullae. Not on palms/soles . Has not completed her 24hr UA; Is taking ASA. + FM. Denies s/s of pre-e SF       Review of Systems  Genitourinary: neg SROM, or dysuria      /74   Wt 105 kg (232 lb 6.4 oz)   LMP 2022 (Exact Date)   BMI 43.91 kg/m²   Vitals: VSS; AF    General Appearance:  Awake. Alert. Well developed. Well nourished. In no acute distress.    Visual Inspection: ° Abdomen was normal on visual inspection.  Palpation: ° Abdomen was soft. ° Abdominal non-tender.    Uterus: ° Fundal height was normal for gestational age. ° Not tender.  Uterine Adnexae: ° Normal without masses or tenderness.  Neurological:  ° Oriented to time, place, and person.  Skin:  ° General appearance was normal. No bruising or ecchymosis.  Obstetrical: + FM and FCA     Presentation: VTX  Placenta: posterior  MARIMAR: 15.49cm  FCA: 150's  Cervical length: 4.09cm  EFW 1709g, 3.12# 67%      Assessment    1) Pregnancy at 30+0- NIPS has been attempted x 2 but cancelled d/t low fetal DNA. Offer quad screen today, pt declines. AFP neg.  US IMP:  VTX. Normal anatomy, male. CL 4.1cm.    Presentation: VTX  Placenta: posterior  MARIMAR: 15.49cm  FCA: 150's  Cervical length: 4.09cm  EFW 1709g, 3.12# 67%      2) s/p  x 2- Plan repeat  @ 39 weeks     3) S/P tubal reversal     4) H/o preeclampsia- Has not turned in 24 hr urine   Baby ASA   Baseline CMP reassuring  If concern for GHTN consider RLTCS 37-38wga ( If closer to 37wga would give steroids 36+ wga)      5) NIPS- Cancelled x 2 d/t low DNA.     6) Flu vaccine received    7) Tdap received      8) SMA carrier- FOB neg last pregnancy     9) AMA- NIPS was cancelled d/t low fetal DNA x 2. AFP negative   NST/BPP @ 36wga     10) Increased BMI- Obese women with a pre-pregnancy BMI of 30+  should strive to gain approx 11-20 pounds for the entire pregnancy.Check growth US 28, 32 and 36 weeks.   Growth appropriate today 30wga; consider 36wga if desire       11) Sterilization   Sterilization Consult  We discussed all other forms of contraception including pills, patch, vaginal ring, injection, implant, IUD, and vasectomy. We discussed the forms of permanent sterilization; laparoscopic tubal occlusion/partial salpingectomy, and laparoscopic salpingectomy.  We discussed a minimal increased risk of bleeding with the salpingectomy as well as the benefits including ovarian cancer risk reduction and reduced risk of ectopic pregnancy.   We discussed a risk of regret of up to 40% in women <30 years of age.  I asked her to consider if her desires would change if something happened to her current children, if she were to divorce, or if her spouse were to die unexpectedly.  We discussed that this is a permanent procedure and that she would need in vitro fertilization at the cost of up to $20,000 per cycle if she desired a pregnancy after this procedure.    We discussed that risks of surgery also include bleeding, possible need for a transfusion (with associated risks of HIV, hepatitis, and other infectious diseases), infection requiring prolonged hospitalization and treatment with antibiotics, damage to other structures (bowel, bladder, ureters, blood vessels) requiring further surgery necessitating a larger incision to remove or repair affected organs with subsequent poor wound healing, and persistent pain.    She was also counseled that anesthesia carries its own risks and that any major surgery carries the rare risk of blood clots, pulmonary embolism, stroke, heart attack, or death. All of the patient's questions were answered to her satisfaction and she desires to proceed with surgery.    Plan    Continue prenatal vitamins   Reviewed this stage of pregnancy  Problem list updated   Follow up in 2  weeks  Sterilization paperwork today         Malick Lombardi DO  12/1/2022  09:03 EST    I spent >30 minutes caring for Theresa on this date of service. This time includes time spent by me in the following activities: preparing for the visit, reviewing tests, obtaining and/or reviewing a separately obtained history, performing a medically appropriate examination and/or evaluation, counseling and educating the patient/family/caregiver and ordering medications, tests, or procedures

## 2022-12-15 ENCOUNTER — ROUTINE PRENATAL (OUTPATIENT)
Dept: OBSTETRICS AND GYNECOLOGY | Facility: CLINIC | Age: 37
End: 2022-12-15

## 2022-12-15 VITALS — SYSTOLIC BLOOD PRESSURE: 122 MMHG | BODY MASS INDEX: 43.65 KG/M2 | WEIGHT: 231 LBS | DIASTOLIC BLOOD PRESSURE: 76 MMHG

## 2022-12-15 DIAGNOSIS — Z34.93 PRENATAL CARE IN THIRD TRIMESTER: Primary | ICD-10-CM

## 2022-12-15 PROCEDURE — 99213 OFFICE O/P EST LOW 20 MIN: CPT | Performed by: NURSE PRACTITIONER

## 2022-12-15 NOTE — PROGRESS NOTES
OB follow up     CC:  Here for prenatal follow up    Theresa ROA is a 37 y.o.  32w0d being seen today for her obstetrical visit. She reports feeling well. Reports good fetal movement. Taking PNV.     Review of Systems  Genitourinary: neg SROM, or dysuria      /76   Wt 105 kg (231 lb)   LMP 2022 (Exact Date)   BMI 43.65 kg/m²     FHT: 140s  BPM   Uterine Size: Growth 60%    Assessment    1) Pregnancy at 32w0d-  US IMP: Growth 60%. MARIMAR 12cm. EFW 4-9#. VTX.  bpm.     2) s/p  x 2- Plan repeat  @ 39 weeks     3) S/P tubal reversal     4) H/o preeclampsia- She is taking baby ASA.  Pt reports she dropped off a 24 hr urine weeks ago but no results to view. MA's notified and looking for lab results.     5) NIPS- Cancelled x 2 d/t low DNA.    6) Spotting- resolved. Rh +.     7) SMA carrier- FOB neg last pregnancy     8) AMA- NIPS was cancelled d/t low fetal DNA x 2. Pt declines Quad today. She understands that at her next appt the lab will not be an optional test.     9) Increased BMI- Obese women with a pre-pregnancy BMI of 30+ should strive to gain approx 11-20 pounds for the entire pregnancy.Check growth US 28, 32 and 36 weeks. Growth 60%.     10) Flu vaccine- . Pt declines new vaccine. Disc use of Tamiflu.     11) tDap vaccine- . Pt declines new vaccine.     Plan    Continue prenatal vitamins   Reviewed this stage of pregnancy  Problem list updated   Follow up in 2 weeks    THAIS Richmond  12/15/2022  10:48 EST    I spent 20 minutes caring for Theresa on this date of service. This time includes time spent by me in the following activities: preparing for the visit, reviewing tests, obtaining and/or reviewing a separately obtained history, performing a medically appropriate examination and/or evaluation, counseling and educating the patient/family/caregiver, ordering medications, tests, or procedures, documenting information in the medical record and care  coordination

## 2022-12-29 ENCOUNTER — HOSPITAL ENCOUNTER (OUTPATIENT)
Dept: ULTRASOUND IMAGING | Facility: HOSPITAL | Age: 37
Discharge: HOME | End: 2022-12-29
Admitting: STUDENT IN AN ORGANIZED HEALTH CARE EDUCATION/TRAINING PROGRAM

## 2022-12-29 ENCOUNTER — HOSPITAL ENCOUNTER (OUTPATIENT)
Facility: HOSPITAL | Age: 37
Discharge: HOME | End: 2022-12-29
Attending: OBSTETRICS & GYNECOLOGY | Admitting: OBSTETRICS & GYNECOLOGY
Payer: COMMERCIAL

## 2022-12-29 ENCOUNTER — ROUTINE PRENATAL (OUTPATIENT)
Dept: OBSTETRICS AND GYNECOLOGY | Facility: CLINIC | Age: 37
End: 2022-12-29

## 2022-12-29 ENCOUNTER — OFFICE VISIT (OUTPATIENT)
Dept: OBSTETRICS AND GYNECOLOGY | Facility: CLINIC | Age: 37
End: 2022-12-29

## 2022-12-29 VITALS — WEIGHT: 243 LBS | SYSTOLIC BLOOD PRESSURE: 136 MMHG | DIASTOLIC BLOOD PRESSURE: 88 MMHG | BODY MASS INDEX: 45.91 KG/M2

## 2022-12-29 VITALS
TEMPERATURE: 98.7 F | SYSTOLIC BLOOD PRESSURE: 133 MMHG | HEART RATE: 82 BPM | BODY MASS INDEX: 45.73 KG/M2 | DIASTOLIC BLOOD PRESSURE: 77 MMHG | WEIGHT: 242 LBS

## 2022-12-29 DIAGNOSIS — Z98.890 HISTORY OF REVERSAL OF TUBAL LIGATION: ICD-10-CM

## 2022-12-29 DIAGNOSIS — D25.9 UTERINE FIBROID IN PREGNANCY: Primary | ICD-10-CM

## 2022-12-29 DIAGNOSIS — O09.529 ANTEPARTUM MULTIGRAVIDA OF ADVANCED MATERNAL AGE: ICD-10-CM

## 2022-12-29 DIAGNOSIS — O34.10: Primary | ICD-10-CM

## 2022-12-29 DIAGNOSIS — O34.10 UTERINE FIBROID IN PREGNANCY: ICD-10-CM

## 2022-12-29 DIAGNOSIS — Z98.891 PREVIOUS CESAREAN SECTION: ICD-10-CM

## 2022-12-29 DIAGNOSIS — Z14.8 GENETIC CARRIER: ICD-10-CM

## 2022-12-29 DIAGNOSIS — O34.10 UTERINE FIBROID IN PREGNANCY: Primary | ICD-10-CM

## 2022-12-29 DIAGNOSIS — D21.9 FIBROIDS: ICD-10-CM

## 2022-12-29 DIAGNOSIS — Z3A.34 34 WEEKS GESTATION OF PREGNANCY: ICD-10-CM

## 2022-12-29 DIAGNOSIS — O09.891 SHORT INTERVAL BETWEEN PREGNANCIES AFFECTING PREGNANCY IN FIRST TRIMESTER, ANTEPARTUM: ICD-10-CM

## 2022-12-29 DIAGNOSIS — O09.299 HX OF PREECLAMPSIA, PRIOR PREGNANCY, CURRENTLY PREGNANT: ICD-10-CM

## 2022-12-29 DIAGNOSIS — D25.9 UTERINE FIBROID IN PREGNANCY: ICD-10-CM

## 2022-12-29 DIAGNOSIS — D25.9: Primary | ICD-10-CM

## 2022-12-29 DIAGNOSIS — O34.211 MATERNAL CARE DUE TO LOW TRANSVERSE UTERINE SCAR FROM PREVIOUS CESAREAN DELIVERY: ICD-10-CM

## 2022-12-29 LAB
GLUCOSE UR STRIP-MCNC: NEGATIVE MG/DL
PROT UR STRIP-MCNC: NEGATIVE MG/DL

## 2022-12-29 PROCEDURE — 96372 THER/PROPH/DIAG INJ SC/IM: CPT

## 2022-12-29 PROCEDURE — 25010000002 BETAMETHASONE ACET & SOD PHOS PER 4 MG: Performed by: OBSTETRICS & GYNECOLOGY

## 2022-12-29 PROCEDURE — 76819 FETAL BIOPHYS PROFIL W/O NST: CPT | Performed by: OBSTETRICS & GYNECOLOGY

## 2022-12-29 PROCEDURE — 99214 OFFICE O/P EST MOD 30 MIN: CPT | Performed by: STUDENT IN AN ORGANIZED HEALTH CARE EDUCATION/TRAINING PROGRAM

## 2022-12-29 PROCEDURE — 76805 OB US >/= 14 WKS SNGL FETUS: CPT

## 2022-12-29 PROCEDURE — 76817 TRANSVAGINAL US OBSTETRIC: CPT

## 2022-12-29 PROCEDURE — 99215 OFFICE O/P EST HI 40 MIN: CPT | Performed by: OBSTETRICS & GYNECOLOGY

## 2022-12-29 PROCEDURE — 76805 OB US >/= 14 WKS SNGL FETUS: CPT | Performed by: OBSTETRICS & GYNECOLOGY

## 2022-12-29 PROCEDURE — 76819 FETAL BIOPHYS PROFIL W/O NST: CPT

## 2022-12-29 RX ORDER — BETAMETHASONE SODIUM PHOSPHATE AND BETAMETHASONE ACETATE 3; 3 MG/ML; MG/ML
12 INJECTION, SUSPENSION INTRA-ARTICULAR; INTRALESIONAL; INTRAMUSCULAR; SOFT TISSUE ONCE
Status: COMPLETED | OUTPATIENT
Start: 2022-12-29 | End: 2022-12-29

## 2022-12-29 RX ORDER — BETAMETHASONE SODIUM PHOSPHATE AND BETAMETHASONE ACETATE 3; 3 MG/ML; MG/ML
12 INJECTION, SUSPENSION INTRA-ARTICULAR; INTRALESIONAL; INTRAMUSCULAR; SOFT TISSUE EVERY 24 HOURS
Status: SHIPPED | OUTPATIENT
Start: 2022-12-29 | End: 2022-12-31

## 2022-12-29 RX ADMIN — BETAMETHASONE SODIUM PHOSPHATE AND BETAMETHASONE ACETATE 12 MG: 3; 3 INJECTION, SUSPENSION INTRA-ARTICULAR; INTRALESIONAL; INTRAMUSCULAR at 14:10

## 2022-12-29 NOTE — PROGRESS NOTES
MATERNAL FETAL MEDICINE Consult Note    Dear Dr Malick Lombardi, DO:    Thank you for your kind referral of Theresa ROA.  As you know, she is a 37 y.o.   at 34  0/7 weeks gestation (Estimated Date of Delivery: 23). This is a consult.    Her antepartum course is complicated by:  Prolapsing cervical fibroid  2 prior CS      HPI: Today, she denies headache, blurry vision, RUQ pain. No vaginal bleeding, no contractions.     Review of History:  Past Medical History:   Diagnosis Date   • Anxiety    • Depression    • Gestational hypertension     with all pregnancies   • Preeclampsia 2022     Past Surgical History:   Procedure Laterality Date   •  SECTION     •  SECTION N/A 2022    Procedure:  SECTION REPEAT;  Surgeon: Paris Fitzgerald MD;  Location: [facility] LABOR DELIVERY;  Service: Obstetrics/Gynecology;  Laterality: N/A;   • CHOLECYSTECTOMY     • TUBAL ABDOMINAL LIGATION      and reversal   • TUBAL REANASTOMOSIS         Social History     Socioeconomic History   • Marital status:    Tobacco Use   • Smoking status: Never   • Smokeless tobacco: Never   Vaping Use   • Vaping Use: Never used   Substance and Sexual Activity   • Alcohol use: Never   • Drug use: Never   • Sexual activity: Yes     Partners: Male     Birth control/protection: None     Family History   Problem Relation Age of Onset   • Diabetes Father    • Hypertension Father    • Heart disease Father    • Depression Mother    • Anxiety disorder Mother    • Breast cancer Neg Hx    • Ovarian cancer Neg Hx    • Uterine cancer Neg Hx    • Colon cancer Neg Hx    • Deep vein thrombosis Neg Hx    • Pulmonary embolism Neg Hx       No Known Allergies   No current facility-administered medications on file prior to visit.     Current Outpatient Medications on File Prior to Visit   Medication Sig Dispense Refill   • aspirin 81 MG EC tablet Take 1 tablet by mouth Daily. 30 tablet 2   • Prenatal Vit-Fe Fumarate-FA  (Prenatal Vitamin) 27-0.8 MG tablet Take 1 tablet by mouth Daily. 30 tablet 11   • [DISCONTINUED] loratadine (Claritin) 10 MG tablet Take 1 tablet by mouth Daily. 30 tablet 2        Past obstetric, gynecological, medical, surgical, family and social history reviewed.  Relevant lab work and imaging reviewed.    Review of systems  Constitutional:  denies fever, chills, malaise.   ENT/Mouth:  denies sore throat, tinnitis  Eyes: denies vision changes/pain  CV:  denies chest pain  Respiratory:  denies cough/SOB  GI:  denies N/V, diarrhea, abdominal pain.    :   denies dysuria  Skin:  denies lesions or pruritis   Neuro:  denies weakness, focal neurologic symptoms    Vitals:    22 1140   BP: 133/77   BP Location: Right arm   Patient Position: Sitting   Pulse: 82   Temp: 98.7 °F (37.1 °C)   TempSrc: Temporal   Weight: 110 kg (242 lb)       PHYSICAL EXAM   GENERAL: Not in acute distress, AAOx3, pleasant  CARDIO: regular rate and rhythm  PULM: symmetric chest rise, speaking in complete sentences without difficulty  NEURO: awake, alert and oriented to person, place, and time  ABDOMINAL: No fundal tenderness, no rebound or guarding, gravid  EXTREMITIES: no bilateral lower extremity edema/tenderness  SKIN: Warm, well-perfused      ULTRASOUND   Please view full ultrasound note on Imaging tab in [employer].  Cephalic presentation  Posterior placenta.  MARIMAR 18 cm, which is normal.  EFW 62% 78%  BPP 8/8 , MARIMAR 18.   Fibroid noted in endocervical canal (4.2x4.0x4.1 cm ).  Appears pedunculated.      ASSESSMENT/COUNSELIN y.o. G  P  at   /7 weeks gestation (Estimated Date of Delivery: 23)    -Pregnancy  [ X ] stable  [   ] improving [  ] worsening    Diagnoses and all orders for this visit:    1. Uterine fibroid complicating  care, baby not yet delivered, unspecified trimester (Primary)    Other orders  -     betamethasone acetate-betamethasone sodium phosphate (CELESTONE SOLUSPAN) injection 12 mg          Prolapsing cervical fibroid:  Appears pedunculated with a stalk on transvaginal US.  On speculum exam, appears smooth and mobile.  Somewhat vascular, but without any bleeding.  Did a gentle digital exam and felt all the way around the fibroid--feels separate from the cervix, well circumscribed and smooth.  Differential includes cervical fibroid, polyp, cancer (very low suspicion given exam and previous normal pap but HPV+).  By far, the most likely diagnosis is a cervical pedunculated fibroid.    Prolapsed fibroids rarely occur during pregnancy and are the subject of case reports. A review of 11 pregnant patients reported that two of the prolapsing fibroids were submucosal while the other nine originated from the portio of the cervix. Management is not well delineated as this is the subject of case reports and I discussed this with her.  Currently she denies bleeding, pain, just some discomfort at times when she feels it in her vagina.      We discussed 2 management options: 1)a transvaginal myomectomy with endoloops/cautery and 2)delivery via CS at 37 weeks with either removal at that time or interval removal if it is not causing issues.  Regardless, she is at high risk because of this fibroid distending her cervix about 3-4 cm of having significant bleeding, PPROM, PTL/PTD.  She understands this.  However, we also discussed that removal at this time could precipitate bleeding and  labor/necessitate  delivery--that these are risks both with removal and with expectant management.  She is not for a vaginal delivery with her 2 prior CS and would like to try and get further along if possible.  In several of the case reports, prolapsed fibroid caused excessive bleeding, infection, pain or urinary retention--if any of these things were to occur, she would be for removal at that time +/- delivery depending on gestational age and other factors. Surgical removal during pregnancy may be associated with  rupture of the membranes,  labor, or hemorrhage with need for hysterectomy, but in two patients reported requiring removal, there were no complications.    If removal is required for any of the above reasons (bleeding, infection, excessive pain, urinary retention), I am happy to help facilitate.  Otherwise, after R/B discussion with patient, the plan is to try to make it to a scheduled CS at 37 weeks.  At that time, if the fibroid is bleeding/causing issues, it may be removed via endoloop/cautery, etc. (it feels pedunculated on exam with a maybe 1 cm stalk).  CS at 37 weeks is indicated due to patient's risk of unscheduled bleeding, PPROM.     If the fibroid is able to be replaced in the uterus and does not cause bleeding/other issues, an interval removal at 6-8 wk postpartum may also be an option.  Dr. Lombardi and I talked through these options and he and his team are comfortable with delivery at 37 weeks and hopeful for an interval removal which is very appropriate.  Appreciate his help and care of the patient.    We discussed pelvic rest, modified bedrest and no lifting more than 15 lb.  She is amenable to this for the rest of pregancy.  She is to go immediately to the hospital with pain, urinary retention, bleeding, PPROM and understands.  I recommend weekly visits with Dr. Lombardi' office for ANFS/assessment of symptoms.      We discussed steroids due to her high risk of needing a  delivery and she is amenable to  corticosteroids for fetal lung maturity.  We gave the first dose on L&D today around 1:30-2PM and Dr. Lombardi will get her set up for visit next week and 2nd steroid dose tomorrow.  Pt is amenable to plan of care.    If removed, I would send for pathology, though I have a low suspicion for cancer given characteristics of this mass and ultrasound imaging.      Summary of Plan  -Weekly  fetal surveillance with assessment of maternal symptoms with primary OB  -Modified  bedrest/pelvic rest/lifting precautions  -Plan is for 37 week CS and hopeful for interval removal of fibroid at 6-8 weeks.  Would have endoloop, etc available in case bleeding from fibroid during delivery.  -Happy to assist if removal of fibroid needed sooner      Follow-up: No follow up with MFM scheduled, but I am happy to see for follow up at request of primary obstetrician    Thank you for the consult and opportunity to care for this patient.  Please feel free to reach out with any questions or concerns.      I spent 45 minutes caring for this patient on this date of service. This time includes time spent by me in the following activities: preparing for the visit, reviewing tests, obtaining and/or reviewing a separately obtained history, performing a medically appropriate examination and/or evaluation, counseling and educating the patient/family/caregiver and independently interpreting results and communicating that information with the patient/family/caregiver with greater than 50% spent in counseling and coordination of care.     Joy Tamayo MD FACOG  [facility]  Office: [phone]  [email or web address]

## 2022-12-29 NOTE — PROGRESS NOTES
OB follow up     CC:  Here for prenatal follow up    Threesa ROA is a 37 y.o.  34+0 being seen today for her obstetrical visit. She reports feeling well; but had pelvic pressure. Internal vaginal exam and she flet something; not a foot/hand. Denies LOF or VB  Reports good fetal movement. Taking PNV.     Review of Systems  Genitourinary: neg SROM, or dysuria    Vitals:    22 0913   BP: 136/88   Weight: 110 kg (243 lb)     Vitals: VSS; AF    General Appearance:  Awake. Alert. Well developed. Well nourished. In no acute distress.    Lungs:  ° Clear to auscultation bilaterally without wheezes, rales or rhonchi.  Cardiovascular:  ° System: RRR, no murmur, consistent with normal pregnancy.  Abdomen:  Visual Inspection: ° Abdomen was normal on visual inspection.  Palpation: ° Abdomen was soft. ° Abdominal non-tender.  Genitalia:  External: ° Vulva was normal. ° Labia showed no abnormalities. ° Clitoris was normal. ° [place]'s gland was normal. ° Bartholin's gland was normal. ° Genitalia exhibited no lesion.  Vagina: ° Mucosa was normal. ° Not tender. ° No vaginal mass was observed.  Cervix: ° Prolapsed fibroid? 4x4cm. Not bleeding/oozing.   Uterus: ° Fundal height was normal for gestational age. ° Not tender.  Uterine Adnexae: ° Normal without masses or tenderness.  Neurological:  ° Oriented to time, place, and person.  Skin:  ° General appearance was normal. No bruising or ecchymosis.    /88   Wt 110 kg (243 lb)   LMP 2022 (Exact Date)   BMI 45.91 kg/m²       Assessment    1) Pregnancy at 34w0d-        2) s/p  x 2- Plan repeat  @ 39 weeks     3) S/P tubal reversal     4) H/o preeclampsia- She is taking baby ASA.  Pt reports she dropped off a 24 hr urine weeks ago but no results to view. MA's notified and looking for lab results.     5) NIPS- Cancelled x 2 d/t low DNA.    6) Fibroids  Prolapsed from Os? STAT MFM referral      7) SMA carrier- FOB neg last pregnancy     8) AMA-  NIPS was cancelled d/t low fetal DNA x 2. Pt declines Quad today. She understands that at her next appt the lab will not be an optional test.     9) Increased BMI- Obese women with a pre-pregnancy BMI of 30+ should strive to gain approx 11-20 pounds for the entire pregnancy.Check growth US 28, 32 and 36 weeks. Growth 60%.     10) Flu vaccine-  Pt declines new vaccine ( Had Jan 22) . Disc use of Tamiflu.     11) tDap vaccine-  Pt declines new vaccine. ( had Jan 22)     Plan  Prolapsed fibroid in pregnancy. Called [facility] and had colleague assess with me on PE ( Dr. Fitzgerald) Dr. Tamayo was gracious enough to take my phone consult and allow pt to go directly to her office for assessment/imaging and disposition. Thank you for assisting me with this complicated pt. Briefly discussed pt may need steroids, antepartum assessment/admission or delivery.     Stat consult placed [facility]     Malick Lombardi DO  12/29/2022  11:58 EST    I spent >30 minutes caring for Theresa on this date of service. This time includes time spent by me in the following activities: preparing for the visit, reviewing tests, obtaining and/or reviewing a separately obtained history, performing a medically appropriate examination and/or evaluation, counseling and educating the patient/family/caregiver, ordering medications, tests, or procedures, documenting information in the medical record and care coordination

## 2022-12-29 NOTE — PROGRESS NOTES
"Pt denies vaginal bleeding, contractions or LOF at this time. Note occasional tightness in abdomen and lower right sided discomfort. Denies consistency of pain.   Pt sent from OB office today for evaluation. Theresa reports on Monday (12/26) and Wednesday (12/28) she felt vaginal pressure while using the restroom. Notes on both days she felt pressure and a lump that she described it as feeling hard and protruding from her vagina. Report on Monday she had an increase in yellow, mucous like discharge when feeling this pressure. Similar report given for Wednesday \"only not as much.\"  Theresa reports on Wednesday the pressure was intense and while feeling the protruding lump she applied pressure to \"push it back up.\" All reports discussed with Dr. Tamayo. Pt continues to report active fetal movement but notes when these episodes happen she doesn't feel as many movements. Currently denies HA, visual changes or epigastric pain. No follow up OB appointment scheduled at this time. Pt notes that she will contact OB office after speaking with MFM to scheduled next appointment.     Vitals:    12/29/22 1140   BP: 133/77   Pulse: 82   Temp: 98.7 °F (37.1 °C)        "

## 2022-12-30 ENCOUNTER — HOSPITAL ENCOUNTER (OUTPATIENT)
Facility: HOSPITAL | Age: 37
Discharge: HOME | End: 2022-12-30
Attending: OBSTETRICS & GYNECOLOGY | Admitting: OBSTETRICS & GYNECOLOGY
Payer: COMMERCIAL

## 2022-12-30 VITALS
DIASTOLIC BLOOD PRESSURE: 68 MMHG | WEIGHT: 242 LBS | TEMPERATURE: 98.2 F | HEART RATE: 78 BPM | HEIGHT: 61 IN | BODY MASS INDEX: 45.69 KG/M2 | RESPIRATION RATE: 18 BRPM | SYSTOLIC BLOOD PRESSURE: 128 MMHG

## 2022-12-30 PROCEDURE — 96372 THER/PROPH/DIAG INJ SC/IM: CPT

## 2022-12-30 PROCEDURE — 25010000002 BETAMETHASONE ACET & SOD PHOS PER 4 MG

## 2022-12-30 RX ORDER — BETAMETHASONE SODIUM PHOSPHATE AND BETAMETHASONE ACETATE 3; 3 MG/ML; MG/ML
INJECTION, SUSPENSION INTRA-ARTICULAR; INTRALESIONAL; INTRAMUSCULAR; SOFT TISSUE
Status: COMPLETED
Start: 2022-12-30 | End: 2022-12-30

## 2022-12-30 RX ORDER — BETAMETHASONE SODIUM PHOSPHATE AND BETAMETHASONE ACETATE 3; 3 MG/ML; MG/ML
12 INJECTION, SUSPENSION INTRA-ARTICULAR; INTRALESIONAL; INTRAMUSCULAR; SOFT TISSUE ONCE
Status: COMPLETED | OUTPATIENT
Start: 2022-12-30 | End: 2022-12-30

## 2022-12-30 RX ADMIN — BETAMETHASONE SODIUM PHOSPHATE AND BETAMETHASONE ACETATE 12 MG: 3; 3 INJECTION, SUSPENSION INTRA-ARTICULAR; INTRALESIONAL; INTRAMUSCULAR; SOFT TISSUE at 15:58

## 2022-12-30 RX ADMIN — BETAMETHASONE ACETATE AND BETAMETHASONE SODIUM PHOSPHATE 12 MG: 3; 3 INJECTION, SUSPENSION INTRA-ARTICULAR; INTRALESIONAL; INTRAMUSCULAR; SOFT TISSUE at 15:58

## 2023-01-05 ENCOUNTER — PREP FOR SURGERY (OUTPATIENT)
Dept: OTHER | Facility: HOSPITAL | Age: 38
End: 2023-01-05
Payer: COMMERCIAL

## 2023-01-05 ENCOUNTER — ROUTINE PRENATAL (OUTPATIENT)
Dept: OBSTETRICS AND GYNECOLOGY | Facility: CLINIC | Age: 38
End: 2023-01-05
Payer: COMMERCIAL

## 2023-01-05 VITALS — SYSTOLIC BLOOD PRESSURE: 132 MMHG | WEIGHT: 243 LBS | DIASTOLIC BLOOD PRESSURE: 82 MMHG | BODY MASS INDEX: 45.91 KG/M2

## 2023-01-05 DIAGNOSIS — Z98.891 PREVIOUS CESAREAN SECTION: Primary | ICD-10-CM

## 2023-01-05 DIAGNOSIS — Z30.2 ENCOUNTER FOR STERILIZATION: ICD-10-CM

## 2023-01-05 DIAGNOSIS — D25.9 FIBROID OF CERVIX: ICD-10-CM

## 2023-01-05 DIAGNOSIS — D64.9 ANEMIA, UNSPECIFIED TYPE: Primary | ICD-10-CM

## 2023-01-05 LAB
BASOPHILS # BLD AUTO: 0.06 10*3/MM3 (ref 0–0.2)
BASOPHILS NFR BLD AUTO: 0.5 % (ref 0–1.5)
EOSINOPHIL # BLD AUTO: 0.19 10*3/MM3 (ref 0–0.4)
EOSINOPHIL NFR BLD AUTO: 1.5 % (ref 0.3–6.2)
ERYTHROCYTE [DISTWIDTH] IN BLOOD BY AUTOMATED COUNT: 12.6 % (ref 12.3–15.4)
GLUCOSE UR STRIP-MCNC: NEGATIVE MG/DL
HCT VFR BLD AUTO: 38.5 % (ref 34–46.6)
HGB BLD-MCNC: 12.9 G/DL (ref 12–15.9)
IMM GRANULOCYTES # BLD AUTO: 0.15 10*3/MM3 (ref 0–0.05)
IMM GRANULOCYTES NFR BLD AUTO: 1.2 % (ref 0–0.5)
LYMPHOCYTES # BLD AUTO: 2.35 10*3/MM3 (ref 0.7–3.1)
LYMPHOCYTES NFR BLD AUTO: 18.8 % (ref 19.6–45.3)
MCH RBC QN AUTO: 28.5 PG (ref 26.6–33)
MCHC RBC AUTO-ENTMCNC: 33.5 G/DL (ref 31.5–35.7)
MCV RBC AUTO: 85 FL (ref 79–97)
MONOCYTES # BLD AUTO: 1.01 10*3/MM3 (ref 0.1–0.9)
MONOCYTES NFR BLD AUTO: 8.1 % (ref 5–12)
NEUTROPHILS # BLD AUTO: 8.73 10*3/MM3 (ref 1.7–7)
NEUTROPHILS NFR BLD AUTO: 69.9 % (ref 42.7–76)
NRBC BLD AUTO-RTO: 0 /100 WBC (ref 0–0.2)
PLATELET # BLD AUTO: 276 10*3/MM3 (ref 140–450)
PROT UR STRIP-MCNC: NEGATIVE MG/DL
RBC # BLD AUTO: 4.53 10*6/MM3 (ref 3.77–5.28)
WBC # BLD AUTO: 12.49 10*3/MM3 (ref 3.4–10.8)

## 2023-01-05 PROCEDURE — 99215 OFFICE O/P EST HI 40 MIN: CPT | Performed by: OBSTETRICS & GYNECOLOGY

## 2023-01-05 RX ORDER — ACETAMINOPHEN 500 MG
1000 TABLET ORAL ONCE
Status: CANCELLED | OUTPATIENT
Start: 2023-01-05 | End: 2023-01-05

## 2023-01-05 RX ORDER — SODIUM CHLORIDE, SODIUM LACTATE, POTASSIUM CHLORIDE, CALCIUM CHLORIDE 600; 310; 30; 20 MG/100ML; MG/100ML; MG/100ML; MG/100ML
125 INJECTION, SOLUTION INTRAVENOUS CONTINUOUS
Status: CANCELLED | OUTPATIENT
Start: 2023-01-05

## 2023-01-05 RX ORDER — LIDOCAINE HYDROCHLORIDE 10 MG/ML
5 INJECTION, SOLUTION EPIDURAL; INFILTRATION; INTRACAUDAL; PERINEURAL AS NEEDED
Status: CANCELLED | OUTPATIENT
Start: 2023-01-05

## 2023-01-05 RX ORDER — DOXYCYCLINE HYCLATE 50 MG/1
324 CAPSULE, GELATIN COATED ORAL
Qty: 30 TABLET | Refills: 6 | Status: SHIPPED | OUTPATIENT
Start: 2023-01-05 | End: 2023-03-28

## 2023-01-05 RX ORDER — KETOROLAC TROMETHAMINE 30 MG/ML
30 INJECTION, SOLUTION INTRAMUSCULAR; INTRAVENOUS ONCE
Status: CANCELLED | OUTPATIENT
Start: 2023-01-05 | End: 2023-01-05

## 2023-01-05 RX ORDER — OXYTOCIN/0.9 % SODIUM CHLORIDE 30/500 ML
999 PLASTIC BAG, INJECTION (ML) INTRAVENOUS ONCE
Status: CANCELLED | OUTPATIENT
Start: 2023-01-05

## 2023-01-05 RX ORDER — OXYTOCIN/0.9 % SODIUM CHLORIDE 30/500 ML
250 PLASTIC BAG, INJECTION (ML) INTRAVENOUS CONTINUOUS
Status: CANCELLED | OUTPATIENT
Start: 2023-01-05 | End: 2023-01-05

## 2023-01-05 RX ORDER — MISOPROSTOL 200 UG/1
800 TABLET ORAL ONCE AS NEEDED
Status: CANCELLED | OUTPATIENT
Start: 2023-01-05

## 2023-01-05 RX ORDER — SODIUM CHLORIDE 0.9 % (FLUSH) 0.9 %
10 SYRINGE (ML) INJECTION EVERY 12 HOURS SCHEDULED
Status: CANCELLED | OUTPATIENT
Start: 2023-01-05

## 2023-01-05 RX ORDER — BUPIVACAINE HCL/0.9 % NACL/PF 0.1 %
2 PLASTIC BAG, INJECTION (ML) EPIDURAL ONCE
Status: CANCELLED | OUTPATIENT
Start: 2023-01-05 | End: 2023-01-05

## 2023-01-05 RX ORDER — SODIUM CHLORIDE 0.9 % (FLUSH) 0.9 %
10 SYRINGE (ML) INJECTION AS NEEDED
Status: CANCELLED | OUTPATIENT
Start: 2023-01-05

## 2023-01-05 RX ORDER — METHYLERGONOVINE MALEATE 0.2 MG/ML
200 INJECTION INTRAVENOUS ONCE AS NEEDED
Status: CANCELLED | OUTPATIENT
Start: 2023-01-05

## 2023-01-05 RX ORDER — CARBOPROST TROMETHAMINE 250 UG/ML
250 INJECTION, SOLUTION INTRAMUSCULAR
Status: CANCELLED | OUTPATIENT
Start: 2023-01-05

## 2023-01-05 NOTE — PROGRESS NOTES
OB follow up     Theresa ROA is a 37 y.o.  35w0d being seen today for her obstetrical visit.  Patient reports generalized soreness and pelvic pressure. She denies any VB or LOF. She is maintaining modified bedrest due to her prolpased cervical fibroid. . Fetal movement: decreased. She is not taking iron and so we started it. We discussed stopping ASA 81 mg at 36 weeks and will schedule her for a RLTCS and BTO on 2023 at 37.0 weeks per [facility] recommendations.     Review of Systems  No bleeding, No cramping/contractions     /82   Wt 110 kg (243 lb)   LMP 2022 (Exact Date)   BMI 45.91 kg/m²     FHT: 134 BPM   Uterine Size: 37  cm       Assessment/Plan:    1) 37 y.o.  -pregnancy at 35w0d- decreased fetal movement noted. Fetus meets FKC threshold but ANT still indicated. NST reactive with audible fetal movement and BPP 8/8. FHR= 134, cephalic, MARIMAR= 10.7 and US compared to her last scan on 2022.     2) Prolapsed cervical fibroid- pt has received BMZ x 2 and has seen MFM. Recs are as follows:     -Weekly  fetal surveillance with assessment of maternal symptoms with primary OB     -Modified bedrest/pelvic rest/lifting precautions     -Plan is for 37 week CS and hopeful for interval removal of fibroid at 6-8 weeks.  Would have endoloop, etc available in case bleeding from fibroid during delivery    D/w pt that she will need 2 IVs, T/C for 2 units PRBCS and will be consented for a possible C hyst for bleeding and she voices understanding. We have scheduled her for 2023 and Artis Delcid and Harjit will both be performing the procedure. ispoke with both physicians re: plan of care.    Will make decision re: removal of fibroid at the time of C/S vs. Interval removal at the time of delivery, pending pt's clinical status.     3) Previous C/S x 2- plan RLCS and BTO at 37 weeks. Pt signed BTO papers on 2022.     4) Anemia- HgB 11.8, check CBC today, start daily iron    5)  S/P flu and Tdap vaccine in 1/2022, declines new vaccines     6) H/O preeclampsia- normotensive, stop ASA at 36 weeks.     7) AMA- NIPS has been attempted x 2 but cancelled d/t low fetal DNA. Offered quad screen, pt declined. AFP neg.      8) SMA carrier- FOB neg last pregnancy      9)Increased BMI- Obese women with a pre-pregnancy BMI of 30+ should strive to gain approx 11-20 pounds for the entire pregnancy. Growth US at [facility] 12/29= 65% ( [place])     10) Reviewed this stage of pregnancy    11)Problem list updated     12) Time Spent: I spent > 45 minutes caring for Theresa on this date of service. This time includes time spent by me in the following activities: preparing for the visit, reviewing tests, obtaining and/or reviewing a separately obtained history, performing a medically appropriate examination and/or evaluation, counseling and educating the patient/family/caregiver, ordering medications, tests, or procedures, referring and communicating with other health care professionals, documenting information in the medical record, independently interpreting results and communicating that information with the patient/family/caregiver and care coordination.         Paris Fitzgerald MD    1/5/2023  13:11 EST

## 2023-01-06 ENCOUNTER — PREP FOR SURGERY (OUTPATIENT)
Dept: OTHER | Facility: HOSPITAL | Age: 38
End: 2023-01-06
Payer: COMMERCIAL

## 2023-01-06 ENCOUNTER — TELEPHONE (OUTPATIENT)
Dept: OBSTETRICS AND GYNECOLOGY | Facility: CLINIC | Age: 38
End: 2023-01-06
Payer: COMMERCIAL

## 2023-01-06 DIAGNOSIS — D21.9 FIBROIDS: ICD-10-CM

## 2023-01-06 DIAGNOSIS — Z98.891 PREVIOUS CESAREAN SECTION: Primary | ICD-10-CM

## 2023-01-06 PROBLEM — D25.9 FIBROID OF CERVIX: Status: ACTIVE | Noted: 2023-01-06

## 2023-01-06 PROBLEM — Z30.2 ENCOUNTER FOR STERILIZATION: Status: ACTIVE | Noted: 2023-01-06

## 2023-01-06 RX ORDER — SODIUM CHLORIDE 0.9 % (FLUSH) 0.9 %
10 SYRINGE (ML) INJECTION AS NEEDED
Status: CANCELLED | OUTPATIENT
Start: 2023-01-06

## 2023-01-06 RX ORDER — LIDOCAINE HYDROCHLORIDE 10 MG/ML
5 INJECTION, SOLUTION EPIDURAL; INFILTRATION; INTRACAUDAL; PERINEURAL AS NEEDED
Status: CANCELLED | OUTPATIENT
Start: 2023-01-06

## 2023-01-06 RX ORDER — SODIUM CHLORIDE 0.9 % (FLUSH) 0.9 %
10 SYRINGE (ML) INJECTION EVERY 12 HOURS SCHEDULED
Status: CANCELLED | OUTPATIENT
Start: 2023-01-06

## 2023-01-06 RX ORDER — METHYLERGONOVINE MALEATE 0.2 MG/ML
200 INJECTION INTRAVENOUS ONCE AS NEEDED
Status: CANCELLED | OUTPATIENT
Start: 2023-01-06

## 2023-01-06 RX ORDER — OXYTOCIN/0.9 % SODIUM CHLORIDE 30/500 ML
250 PLASTIC BAG, INJECTION (ML) INTRAVENOUS CONTINUOUS
Status: CANCELLED | OUTPATIENT
Start: 2023-01-06 | End: 2023-01-06

## 2023-01-06 RX ORDER — BUPIVACAINE HCL/0.9 % NACL/PF 0.1 %
2 PLASTIC BAG, INJECTION (ML) EPIDURAL ONCE
Status: CANCELLED | OUTPATIENT
Start: 2023-01-06 | End: 2023-01-06

## 2023-01-06 RX ORDER — ACETAMINOPHEN 500 MG
1000 TABLET ORAL ONCE
Status: CANCELLED | OUTPATIENT
Start: 2023-01-06 | End: 2023-01-06

## 2023-01-06 RX ORDER — KETOROLAC TROMETHAMINE 30 MG/ML
30 INJECTION, SOLUTION INTRAMUSCULAR; INTRAVENOUS ONCE
Status: CANCELLED | OUTPATIENT
Start: 2023-01-06 | End: 2023-01-06

## 2023-01-06 RX ORDER — CARBOPROST TROMETHAMINE 250 UG/ML
250 INJECTION, SOLUTION INTRAMUSCULAR AS NEEDED
Status: CANCELLED | OUTPATIENT
Start: 2023-01-06

## 2023-01-06 RX ORDER — MISOPROSTOL 200 UG/1
800 TABLET ORAL AS NEEDED
Status: CANCELLED | OUTPATIENT
Start: 2023-01-06

## 2023-01-06 RX ORDER — SODIUM CHLORIDE, SODIUM LACTATE, POTASSIUM CHLORIDE, CALCIUM CHLORIDE 600; 310; 30; 20 MG/100ML; MG/100ML; MG/100ML; MG/100ML
125 INJECTION, SOLUTION INTRAVENOUS CONTINUOUS
Status: CANCELLED | OUTPATIENT
Start: 2023-01-06

## 2023-01-06 RX ORDER — OXYTOCIN/0.9 % SODIUM CHLORIDE 30/500 ML
999 PLASTIC BAG, INJECTION (ML) INTRAVENOUS ONCE
Status: CANCELLED | OUTPATIENT
Start: 2023-01-06

## 2023-01-06 NOTE — TELEPHONE ENCOUNTER
PLEASE PLACE ORDERS FOR RCS/BTO, POSS FIBROID REMOVAL, POSS C HYST FOR 1/19/23. DR. DUNBAR TO ASSIST AND ALL PERFORMED IN THE OR. I WILL NOTIFY HANNA

## 2023-01-10 ENCOUNTER — TELEPHONE (OUTPATIENT)
Dept: OBSTETRICS AND GYNECOLOGY | Facility: CLINIC | Age: 38
End: 2023-01-10

## 2023-01-10 NOTE — TELEPHONE ENCOUNTER
Caller: KHANH PHAM    Relationship: [facility] [address]    Best call back number: [phone]    What was the call regarding: KHANH IS REQUESTING PASSPORT AUTHORIZATION FOR ANYTHING THAT IS NOT THE . STATED THIS WOULD INCLUDE THE TUBAL AND/OR HYSTERECTOMY THAT IS TO BE COMPLETED AT THAT TIME.     FAX #: [phone]

## 2023-01-12 ENCOUNTER — ROUTINE PRENATAL (OUTPATIENT)
Dept: OBSTETRICS AND GYNECOLOGY | Facility: CLINIC | Age: 38
End: 2023-01-12
Payer: COMMERCIAL

## 2023-01-12 VITALS — WEIGHT: 244 LBS | SYSTOLIC BLOOD PRESSURE: 134 MMHG | BODY MASS INDEX: 46.1 KG/M2 | DIASTOLIC BLOOD PRESSURE: 88 MMHG

## 2023-01-12 DIAGNOSIS — Z36.85 ENCOUNTER FOR ANTENATAL SCREENING FOR STREPTOCOCCUS B: Primary | ICD-10-CM

## 2023-01-12 LAB
GLUCOSE UR STRIP-MCNC: NEGATIVE MG/DL
PROT UR STRIP-MCNC: NEGATIVE MG/DL

## 2023-01-12 PROCEDURE — 99214 OFFICE O/P EST MOD 30 MIN: CPT | Performed by: STUDENT IN AN ORGANIZED HEALTH CARE EDUCATION/TRAINING PROGRAM

## 2023-01-12 NOTE — PROGRESS NOTES
OB follow up     Theresa ROA is a 37 y.o.  36w0d being seen today for her obstetrical visit.  Patient reports generalized soreness and pelvic pressure. She denies any VB or LOF. She is maintaining modified bedrest due to her prolpased cervical fibroid. . Fetal movement: decreased. She is not taking iron and so we started it. Has RLTCS and BTO on 2023 at 37.0 weeks per [facility] recommendations.     Review of Systems  No bleeding, No cramping/contractions     /88   Wt 111 kg (244 lb)   LMP 2022 (Exact Date)   BMI 46.10 kg/m²   Vitals: VSS; AF    General Appearance:  Awake. Alert. Well developed. Well nourished. In no acute distress.    Visual Inspection: ° Abdomen was normal on visual inspection.  Palpation: ° Abdomen was soft. ° Abdominal non-tender.    Uterus: ° Fundal height was normal for gestational age. ° Not tender.  Uterine Adnexae: ° Normal without masses or tenderness.  Neurological:  ° Oriented to time, place, and person.  Skin:  ° General appearance was normal. No bruising or ecchymosis.  Obstetrical: +FM and FCA    US  BPP 8/8  VTX  Placenta Posterior   's  MARIMAR 18.89cm    NST >20 minutes reactive     Assessment/Plan:    1) 37 y.o.  -pregnancy at 36w0d-    NST reactive with audible fetal movement and BPP 8/8.     2) Prolapsed cervical fibroid- pt has received BMZ x 2 and has seen MFM. Recs are as follows:     -Weekly  fetal surveillance with assessment of maternal symptoms with primary OB     -Modified bedrest/pelvic rest/lifting precautions     -Plan is for 37 week CS and hopeful for interval removal of fibroid at 6-8 weeks.  Would have endoloop, etc available in case bleeding from fibroid during delivery    D/w pt that she will need 2 IVs, T/C for 2 units PRBCS and will be consented for a possible C hyst for bleeding and she voices understanding. We have scheduled her for 2023 and Artis Delcid and Harjit will both be performing the procedure.   Will  make decision re: removal of fibroid at the time of C/S vs. Interval removal at the time of delivery, pending pt's clinical status.     3) Previous C/S x 2- plan RLCS and BTO at 37 weeks. Pt signed BTO papers on 12/1/2022.     4) Anemia- HgB 11.8, check CBC today, start daily iron    5) S/P flu and Tdap vaccine in 1/2022, declines new vaccines     6) H/O preeclampsia- normotensive, stop ASA at 36 weeks.     7) AMA- NIPS has been attempted x 2 but cancelled d/t low fetal DNA. Offered quad screen, pt declined. AFP neg.      8) SMA carrier- FOB neg last pregnancy      9)Increased BMI- Obese women with a pre-pregnancy BMI of 30+ should strive to gain approx 11-20 pounds for the entire pregnancy. Growth US at [facility] 12/29= 65% ( [place])     10) Reviewed this stage of pregnancy    11)Problem list updated     12) Time Spent: I spent > 30 minutes caring for Theresa on this date of service. This time includes time spent by me in the following activities: preparing for the visit, reviewing tests, obtaining and/or reviewing a separately obtained history, performing a medically appropriate examination and/or evaluation, counseling and educating the patient/family/caregiver, ordering medications, tests, or procedures, referring and communicating with other health care professionals, documenting information in the medical record, independently interpreting results and communicating that information with the patient/family/caregiver and care coordination.     Next appt scheduled RLTCS.   L&D precautions given     Malick Lombardi DO    1/12/2023  15:37 EST

## 2023-01-14 LAB — GP B STREP DNA SPEC QL NAA+PROBE: NEGATIVE

## 2023-01-17 ENCOUNTER — ANESTHESIA EVENT (OUTPATIENT)
Dept: PERIOP | Facility: HOSPITAL | Age: 38
End: 2023-01-17
Payer: COMMERCIAL

## 2023-01-18 ENCOUNTER — TELEPHONE (OUTPATIENT)
Dept: OBSTETRICS AND GYNECOLOGY | Facility: HOSPITAL | Age: 38
End: 2023-01-18

## 2023-01-18 PROBLEM — N94.89 ADNEXAL MASS: Status: RESOLVED | Noted: 2021-08-29 | Resolved: 2023-01-18

## 2023-01-18 PROBLEM — O34.211 MATERNAL CARE DUE TO LOW TRANSVERSE UTERINE SCAR FROM PREVIOUS CESAREAN DELIVERY: Status: RESOLVED | Noted: 2022-10-27 | Resolved: 2023-01-18

## 2023-01-18 PROBLEM — N92.6 MISSED MENSES: Status: RESOLVED | Noted: 2022-07-01 | Resolved: 2023-01-18

## 2023-01-18 NOTE — TELEPHONE ENCOUNTER
Spoke with  today @ 1424 and went over ERAS instructions. Patient is aware she is to take two Tylenol 500 mg tablets orally this evening and to complete 20 oz of gatorade (not red) two hours before her given arrival time tomorrow 1/19/23 @ 0900am. Patient aware and verbalized all instructions.-PIOTR Pina

## 2023-01-19 ENCOUNTER — ANESTHESIA (OUTPATIENT)
Dept: PERIOP | Facility: HOSPITAL | Age: 38
End: 2023-01-19
Payer: COMMERCIAL

## 2023-01-19 ENCOUNTER — HOSPITAL ENCOUNTER (INPATIENT)
Facility: HOSPITAL | Age: 38
LOS: 2 days | Discharge: HOME | End: 2023-01-21
Attending: OBSTETRICS & GYNECOLOGY | Admitting: OBSTETRICS & GYNECOLOGY
Payer: COMMERCIAL

## 2023-01-19 DIAGNOSIS — D25.9 FIBROID OF CERVIX: ICD-10-CM

## 2023-01-19 DIAGNOSIS — Z30.2 ENCOUNTER FOR STERILIZATION: ICD-10-CM

## 2023-01-19 DIAGNOSIS — Z98.891 PREVIOUS CESAREAN SECTION: ICD-10-CM

## 2023-01-19 PROBLEM — Z34.90 PREGNANT: Status: ACTIVE | Noted: 2023-01-19

## 2023-01-19 PROBLEM — Z34.90 PREGNANT: Status: RESOLVED | Noted: 2023-01-19 | Resolved: 2023-01-19

## 2023-01-19 LAB
ABO GROUP BLD: NORMAL
ALBUMIN SERPL-MCNC: 3.5 G/DL (ref 3.5–5.2)
ALBUMIN/GLOB SERPL: 1.2 G/DL
ALP SERPL-CCNC: 209 U/L (ref 39–117)
ALT SERPL W P-5'-P-CCNC: 13 U/L (ref 1–33)
AMPHET+METHAMPHET UR QL: NEGATIVE
AMPHETAMINES UR QL: NEGATIVE
ANION GAP SERPL CALCULATED.3IONS-SCNC: 13 MMOL/L (ref 5–15)
AST SERPL-CCNC: 14 U/L (ref 1–32)
BACTERIA UR QL AUTO: ABNORMAL /HPF
BARBITURATES UR QL SCN: NEGATIVE
BENZODIAZ UR QL SCN: NEGATIVE
BILIRUB SERPL-MCNC: 0.3 MG/DL (ref 0–1.2)
BILIRUB UR QL STRIP: NEGATIVE
BLD GP AB SCN SERPL QL: NEGATIVE
BUN SERPL-MCNC: 7 MG/DL (ref 6–20)
BUN/CREAT SERPL: 14.6 (ref 7–25)
BUPRENORPHINE SERPL-MCNC: NEGATIVE NG/ML
CALCIUM SPEC-SCNC: 8.5 MG/DL (ref 8.6–10.5)
CANNABINOIDS SERPL QL: NEGATIVE
CHLORIDE SERPL-SCNC: 102 MMOL/L (ref 98–107)
CLARITY UR: ABNORMAL
CO2 SERPL-SCNC: 20 MMOL/L (ref 22–29)
COCAINE UR QL: NEGATIVE
COLOR UR: YELLOW
CREAT SERPL-MCNC: 0.48 MG/DL (ref 0.57–1)
DEPRECATED RDW RBC AUTO: 40.8 FL (ref 37–54)
EGFRCR SERPLBLD CKD-EPI 2021: 125.3 ML/MIN/1.73
ERYTHROCYTE [DISTWIDTH] IN BLOOD BY AUTOMATED COUNT: 13.4 % (ref 12.3–15.4)
GLOBULIN UR ELPH-MCNC: 2.9 GM/DL
GLUCOSE SERPL-MCNC: 87 MG/DL (ref 65–99)
GLUCOSE UR STRIP-MCNC: NEGATIVE MG/DL
HCT VFR BLD AUTO: 36.1 % (ref 34–46.6)
HGB BLD-MCNC: 12.1 G/DL (ref 12–15.9)
HGB UR QL STRIP.AUTO: ABNORMAL
HYALINE CASTS UR QL AUTO: ABNORMAL /LPF
KETONES UR QL STRIP: NEGATIVE
LEUKOCYTE ESTERASE UR QL STRIP.AUTO: ABNORMAL
MCH RBC QN AUTO: 28.1 PG (ref 26.6–33)
MCHC RBC AUTO-ENTMCNC: 33.5 G/DL (ref 31.5–35.7)
MCV RBC AUTO: 84 FL (ref 79–97)
METHADONE UR QL SCN: NEGATIVE
NITRITE UR QL STRIP: NEGATIVE
OPIATES UR QL: NEGATIVE
OXYCODONE UR QL SCN: NEGATIVE
PCP UR QL SCN: NEGATIVE
PH UR STRIP.AUTO: 6.5 [PH] (ref 4.5–8)
PLATELET # BLD AUTO: 218 10*3/MM3 (ref 140–450)
PMV BLD AUTO: 10.4 FL (ref 6–12)
POTASSIUM SERPL-SCNC: 4.1 MMOL/L (ref 3.5–5.2)
PROPOXYPH UR QL: NEGATIVE
PROT SERPL-MCNC: 6.4 G/DL (ref 6–8.5)
PROT UR QL STRIP: NEGATIVE
RBC # BLD AUTO: 4.3 10*6/MM3 (ref 3.77–5.28)
RBC # UR STRIP: ABNORMAL /HPF
REF LAB TEST METHOD: ABNORMAL
RH BLD: POSITIVE
SODIUM SERPL-SCNC: 135 MMOL/L (ref 136–145)
SP GR UR STRIP: 1.01 (ref 1–1.03)
SQUAMOUS #/AREA URNS HPF: ABNORMAL /HPF
T&S EXPIRATION DATE: NORMAL
TRICYCLICS UR QL SCN: NEGATIVE
UROBILINOGEN UR QL STRIP: ABNORMAL
WBC # UR STRIP: ABNORMAL /HPF
WBC NRBC COR # BLD: 7.95 10*3/MM3 (ref 3.4–10.8)

## 2023-01-19 PROCEDURE — 81001 URINALYSIS AUTO W/SCOPE: CPT | Performed by: OBSTETRICS & GYNECOLOGY

## 2023-01-19 PROCEDURE — 85027 COMPLETE CBC AUTOMATED: CPT | Performed by: OBSTETRICS & GYNECOLOGY

## 2023-01-19 PROCEDURE — 88307 TISSUE EXAM BY PATHOLOGIST: CPT

## 2023-01-19 PROCEDURE — 59515 CESAREAN DELIVERY: CPT | Performed by: OBSTETRICS & GYNECOLOGY

## 2023-01-19 PROCEDURE — 86900 BLOOD TYPING SEROLOGIC ABO: CPT | Performed by: OBSTETRICS & GYNECOLOGY

## 2023-01-19 PROCEDURE — 86850 RBC ANTIBODY SCREEN: CPT | Performed by: OBSTETRICS & GYNECOLOGY

## 2023-01-19 PROCEDURE — 25010000002 KETOROLAC TROMETHAMINE PER 15 MG: Performed by: OBSTETRICS & GYNECOLOGY

## 2023-01-19 PROCEDURE — 80306 DRUG TEST PRSMV INSTRMNT: CPT | Performed by: OBSTETRICS & GYNECOLOGY

## 2023-01-19 PROCEDURE — 25010000002 ONDANSETRON PER 1 MG: Performed by: REGISTERED NURSE

## 2023-01-19 PROCEDURE — 25010000002 AZITHROMYCIN PER 500 MG: Performed by: OBSTETRICS & GYNECOLOGY

## 2023-01-19 PROCEDURE — 88305 TISSUE EXAM BY PATHOLOGIST: CPT

## 2023-01-19 PROCEDURE — 0 CEFAZOLIN SODIUM-DEXTROSE 2-3 GM-%(50ML) RECONSTITUTED SOLUTION: Performed by: REGISTERED NURSE

## 2023-01-19 PROCEDURE — 25010000002 FENTANYL CITRATE (PF) 100 MCG/2ML SOLUTION: Performed by: REGISTERED NURSE

## 2023-01-19 PROCEDURE — 86901 BLOOD TYPING SEROLOGIC RH(D): CPT | Performed by: OBSTETRICS & GYNECOLOGY

## 2023-01-19 PROCEDURE — 58611 LIGATE OVIDUCT(S) ADD-ON: CPT | Performed by: OBSTETRICS & GYNECOLOGY

## 2023-01-19 PROCEDURE — 58611 LIGATE OVIDUCT(S) ADD-ON: CPT | Performed by: STUDENT IN AN ORGANIZED HEALTH CARE EDUCATION/TRAINING PROGRAM

## 2023-01-19 PROCEDURE — 25010000002 DEXAMETHASONE PER 1 MG: Performed by: REGISTERED NURSE

## 2023-01-19 PROCEDURE — 0UB70ZZ EXCISION OF BILATERAL FALLOPIAN TUBES, OPEN APPROACH: ICD-10-PCS | Performed by: OBSTETRICS & GYNECOLOGY

## 2023-01-19 PROCEDURE — 25010000002 PHENYLEPHRINE 10 MG/ML SOLUTION: Performed by: REGISTERED NURSE

## 2023-01-19 PROCEDURE — 94799 UNLISTED PULMONARY SVC/PX: CPT

## 2023-01-19 PROCEDURE — 59514 CESAREAN DELIVERY ONLY: CPT | Performed by: STUDENT IN AN ORGANIZED HEALTH CARE EDUCATION/TRAINING PROGRAM

## 2023-01-19 PROCEDURE — 25010000002 OXYTOCIN PER 10 UNITS: Performed by: REGISTERED NURSE

## 2023-01-19 PROCEDURE — 0 MORPHINE PER 10 MG: Performed by: REGISTERED NURSE

## 2023-01-19 PROCEDURE — 88302 TISSUE EXAM BY PATHOLOGIST: CPT | Performed by: OBSTETRICS & GYNECOLOGY

## 2023-01-19 PROCEDURE — 80053 COMPREHEN METABOLIC PANEL: CPT | Performed by: OBSTETRICS & GYNECOLOGY

## 2023-01-19 RX ORDER — DIPHENHYDRAMINE HYDROCHLORIDE 50 MG/ML
25 INJECTION INTRAMUSCULAR; INTRAVENOUS EVERY 4 HOURS PRN
Status: DISCONTINUED | OUTPATIENT
Start: 2023-01-19 | End: 2023-01-21 | Stop reason: HOSPADM

## 2023-01-19 RX ORDER — DEXAMETHASONE SODIUM PHOSPHATE 4 MG/ML
INJECTION, SOLUTION INTRA-ARTICULAR; INTRALESIONAL; INTRAMUSCULAR; INTRAVENOUS; SOFT TISSUE AS NEEDED
Status: DISCONTINUED | OUTPATIENT
Start: 2023-01-19 | End: 2023-01-19 | Stop reason: SURG

## 2023-01-19 RX ORDER — KETOROLAC TROMETHAMINE 30 MG/ML
30 INJECTION, SOLUTION INTRAMUSCULAR; INTRAVENOUS ONCE
Status: COMPLETED | OUTPATIENT
Start: 2023-01-19 | End: 2023-01-19

## 2023-01-19 RX ORDER — KETOROLAC TROMETHAMINE 30 MG/ML
15 INJECTION, SOLUTION INTRAMUSCULAR; INTRAVENOUS EVERY 6 HOURS
Status: COMPLETED | OUTPATIENT
Start: 2023-01-19 | End: 2023-01-20

## 2023-01-19 RX ORDER — BUPIVACAINE HYDROCHLORIDE 7.5 MG/ML
INJECTION, SOLUTION INTRASPINAL AS NEEDED
Status: DISCONTINUED | OUTPATIENT
Start: 2023-01-19 | End: 2023-01-19 | Stop reason: SURG

## 2023-01-19 RX ORDER — POLYETHYLENE GLYCOL 3350 17 G/17G
17 POWDER, FOR SOLUTION ORAL DAILY
Status: DISCONTINUED | OUTPATIENT
Start: 2023-01-19 | End: 2023-01-21 | Stop reason: HOSPADM

## 2023-01-19 RX ORDER — CEFAZOLIN SODIUM 2 G/50ML
2 SOLUTION INTRAVENOUS ONCE
Status: DISCONTINUED | OUTPATIENT
Start: 2023-01-19 | End: 2023-01-19 | Stop reason: HOSPADM

## 2023-01-19 RX ORDER — MISOPROSTOL 200 UG/1
TABLET ORAL
Status: DISCONTINUED
Start: 2023-01-19 | End: 2023-01-19 | Stop reason: WASHOUT

## 2023-01-19 RX ORDER — ONDANSETRON 4 MG/1
4 TABLET, FILM COATED ORAL EVERY 8 HOURS PRN
Status: DISCONTINUED | OUTPATIENT
Start: 2023-01-19 | End: 2023-01-21 | Stop reason: HOSPADM

## 2023-01-19 RX ORDER — PRENATAL VIT/IRON FUM/FOLIC AC 27MG-0.8MG
1 TABLET ORAL DAILY
Status: DISCONTINUED | OUTPATIENT
Start: 2023-01-19 | End: 2023-01-21 | Stop reason: HOSPADM

## 2023-01-19 RX ORDER — ONDANSETRON 2 MG/ML
4 INJECTION INTRAMUSCULAR; INTRAVENOUS ONCE AS NEEDED
Status: ACTIVE | OUTPATIENT
Start: 2023-01-19 | End: 2023-01-20

## 2023-01-19 RX ORDER — KETOROLAC TROMETHAMINE 30 MG/ML
30 INJECTION, SOLUTION INTRAMUSCULAR; INTRAVENOUS ONCE
Status: DISCONTINUED | OUTPATIENT
Start: 2023-01-19 | End: 2023-01-19 | Stop reason: SDUPTHER

## 2023-01-19 RX ORDER — OXYTOCIN/0.9 % SODIUM CHLORIDE 30/500 ML
250 PLASTIC BAG, INJECTION (ML) INTRAVENOUS CONTINUOUS
Status: ACTIVE | OUTPATIENT
Start: 2023-01-19 | End: 2023-01-19

## 2023-01-19 RX ORDER — DOXYCYCLINE HYCLATE 50 MG/1
324 CAPSULE, GELATIN COATED ORAL
Status: DISCONTINUED | OUTPATIENT
Start: 2023-01-20 | End: 2023-01-21 | Stop reason: HOSPADM

## 2023-01-19 RX ORDER — ACETAMINOPHEN 500 MG
1000 TABLET ORAL ONCE
Status: COMPLETED | OUTPATIENT
Start: 2023-01-19 | End: 2023-01-19

## 2023-01-19 RX ORDER — DIPHENHYDRAMINE HCL 25 MG
25 CAPSULE ORAL EVERY 4 HOURS PRN
Status: DISCONTINUED | OUTPATIENT
Start: 2023-01-19 | End: 2023-01-21 | Stop reason: HOSPADM

## 2023-01-19 RX ORDER — DOCUSATE SODIUM 100 MG/1
100 CAPSULE, LIQUID FILLED ORAL 2 TIMES DAILY
Status: DISCONTINUED | OUTPATIENT
Start: 2023-01-19 | End: 2023-01-21 | Stop reason: HOSPADM

## 2023-01-19 RX ORDER — OXYTOCIN/0.9 % SODIUM CHLORIDE 30/500 ML
PLASTIC BAG, INJECTION (ML) INTRAVENOUS
Status: DISCONTINUED
Start: 2023-01-19 | End: 2023-01-19 | Stop reason: WASHOUT

## 2023-01-19 RX ORDER — OXYCODONE HYDROCHLORIDE 5 MG/1
5 TABLET ORAL EVERY 4 HOURS PRN
Status: DISCONTINUED | OUTPATIENT
Start: 2023-01-19 | End: 2023-01-21 | Stop reason: HOSPADM

## 2023-01-19 RX ORDER — PHENYLEPHRINE HYDROCHLORIDE 10 MG/ML
INJECTION INTRAVENOUS AS NEEDED
Status: DISCONTINUED | OUTPATIENT
Start: 2023-01-19 | End: 2023-01-19 | Stop reason: SURG

## 2023-01-19 RX ORDER — IBUPROFEN 600 MG/1
600 TABLET ORAL EVERY 6 HOURS
Status: DISCONTINUED | OUTPATIENT
Start: 2023-01-20 | End: 2023-01-21 | Stop reason: HOSPADM

## 2023-01-19 RX ORDER — SODIUM CHLORIDE 0.9 % (FLUSH) 0.9 %
10 SYRINGE (ML) INJECTION AS NEEDED
Status: DISCONTINUED | OUTPATIENT
Start: 2023-01-19 | End: 2023-01-19 | Stop reason: HOSPADM

## 2023-01-19 RX ORDER — CARBOPROST TROMETHAMINE 250 UG/ML
250 INJECTION, SOLUTION INTRAMUSCULAR
Status: DISCONTINUED | OUTPATIENT
Start: 2023-01-19 | End: 2023-01-21 | Stop reason: HOSPADM

## 2023-01-19 RX ORDER — ONDANSETRON 2 MG/ML
INJECTION INTRAMUSCULAR; INTRAVENOUS AS NEEDED
Status: DISCONTINUED | OUTPATIENT
Start: 2023-01-19 | End: 2023-01-19 | Stop reason: SURG

## 2023-01-19 RX ORDER — MORPHINE SULFATE 0.5 MG/ML
INJECTION, SOLUTION EPIDURAL; INTRATHECAL; INTRAVENOUS AS NEEDED
Status: DISCONTINUED | OUTPATIENT
Start: 2023-01-19 | End: 2023-01-19 | Stop reason: SURG

## 2023-01-19 RX ORDER — FENTANYL CITRATE 50 UG/ML
INJECTION, SOLUTION INTRAMUSCULAR; INTRAVENOUS AS NEEDED
Status: DISCONTINUED | OUTPATIENT
Start: 2023-01-19 | End: 2023-01-19 | Stop reason: SURG

## 2023-01-19 RX ORDER — OXYTOCIN/0.9 % SODIUM CHLORIDE 30/500 ML
PLASTIC BAG, INJECTION (ML) INTRAVENOUS
Status: DISPENSED
Start: 2023-01-19 | End: 2023-01-19

## 2023-01-19 RX ORDER — OXYTOCIN/0.9 % SODIUM CHLORIDE 30/500 ML
999 PLASTIC BAG, INJECTION (ML) INTRAVENOUS ONCE
Status: DISCONTINUED | OUTPATIENT
Start: 2023-01-19 | End: 2023-01-21 | Stop reason: HOSPADM

## 2023-01-19 RX ORDER — SODIUM CHLORIDE, SODIUM LACTATE, POTASSIUM CHLORIDE, CALCIUM CHLORIDE 600; 310; 30; 20 MG/100ML; MG/100ML; MG/100ML; MG/100ML
125 INJECTION, SOLUTION INTRAVENOUS CONTINUOUS
Status: DISCONTINUED | OUTPATIENT
Start: 2023-01-19 | End: 2023-01-21 | Stop reason: HOSPADM

## 2023-01-19 RX ORDER — LIDOCAINE HYDROCHLORIDE 10 MG/ML
5 INJECTION, SOLUTION EPIDURAL; INFILTRATION; INTRACAUDAL; PERINEURAL AS NEEDED
Status: DISCONTINUED | OUTPATIENT
Start: 2023-01-19 | End: 2023-01-19 | Stop reason: HOSPADM

## 2023-01-19 RX ORDER — FAMOTIDINE 10 MG/ML
INJECTION, SOLUTION INTRAVENOUS AS NEEDED
Status: DISCONTINUED | OUTPATIENT
Start: 2023-01-19 | End: 2023-01-19 | Stop reason: SURG

## 2023-01-19 RX ORDER — METHYLERGONOVINE MALEATE 0.2 MG/ML
200 INJECTION INTRAVENOUS ONCE AS NEEDED
Status: DISCONTINUED | OUTPATIENT
Start: 2023-01-19 | End: 2023-01-21 | Stop reason: HOSPADM

## 2023-01-19 RX ORDER — ACETAMINOPHEN 325 MG/1
650 TABLET ORAL EVERY 6 HOURS
Status: DISCONTINUED | OUTPATIENT
Start: 2023-01-20 | End: 2023-01-21 | Stop reason: HOSPADM

## 2023-01-19 RX ORDER — DIPHENHYDRAMINE HYDROCHLORIDE 50 MG/ML
12.5 INJECTION INTRAMUSCULAR; INTRAVENOUS EVERY 6 HOURS PRN
Status: DISCONTINUED | OUTPATIENT
Start: 2023-01-19 | End: 2023-01-21 | Stop reason: HOSPADM

## 2023-01-19 RX ORDER — CEFAZOLIN SODIUM 2 G/50ML
SOLUTION INTRAVENOUS AS NEEDED
Status: DISCONTINUED | OUTPATIENT
Start: 2023-01-19 | End: 2023-01-19 | Stop reason: SURG

## 2023-01-19 RX ORDER — MISOPROSTOL 200 UG/1
800 TABLET ORAL ONCE AS NEEDED
Status: DISCONTINUED | OUTPATIENT
Start: 2023-01-19 | End: 2023-01-21 | Stop reason: HOSPADM

## 2023-01-19 RX ORDER — SODIUM CHLORIDE 0.9 % (FLUSH) 0.9 %
10 SYRINGE (ML) INJECTION EVERY 12 HOURS SCHEDULED
Status: DISCONTINUED | OUTPATIENT
Start: 2023-01-19 | End: 2023-01-19 | Stop reason: HOSPADM

## 2023-01-19 RX ORDER — OXYTOCIN/0.9 % SODIUM CHLORIDE 30/500 ML
PLASTIC BAG, INJECTION (ML) INTRAVENOUS CONTINUOUS PRN
Status: DISCONTINUED | OUTPATIENT
Start: 2023-01-19 | End: 2023-01-19 | Stop reason: SURG

## 2023-01-19 RX ORDER — OXYCODONE HYDROCHLORIDE 5 MG/1
10 TABLET ORAL EVERY 4 HOURS PRN
Status: DISCONTINUED | OUTPATIENT
Start: 2023-01-19 | End: 2023-01-21 | Stop reason: HOSPADM

## 2023-01-19 RX ORDER — ACETAMINOPHEN 500 MG
1000 TABLET ORAL EVERY 6 HOURS
Status: COMPLETED | OUTPATIENT
Start: 2023-01-19 | End: 2023-01-20

## 2023-01-19 RX ADMIN — DOCUSATE SODIUM 100 MG: 100 CAPSULE ORAL at 20:24

## 2023-01-19 RX ADMIN — FAMOTIDINE 20 MG: 10 INJECTION INTRAVENOUS at 11:50

## 2023-01-19 RX ADMIN — KETOROLAC TROMETHAMINE 15 MG: 30 INJECTION, SOLUTION INTRAMUSCULAR; INTRAVENOUS at 20:24

## 2023-01-19 RX ADMIN — SODIUM CHLORIDE, POTASSIUM CHLORIDE, SODIUM LACTATE AND CALCIUM CHLORIDE 1000 ML: 600; 310; 30; 20 INJECTION, SOLUTION INTRAVENOUS at 09:32

## 2023-01-19 RX ADMIN — MORPHINE SULFATE 0.2 MG: 0.5 INJECTION EPIDURAL; INTRATHECAL; INTRAVENOUS at 12:04

## 2023-01-19 RX ADMIN — KETOROLAC TROMETHAMINE 30 MG: 30 INJECTION, SOLUTION INTRAMUSCULAR; INTRAVENOUS at 13:53

## 2023-01-19 RX ADMIN — CEFAZOLIN SODIUM 2 G: 2 SOLUTION INTRAVENOUS at 12:06

## 2023-01-19 RX ADMIN — ACETAMINOPHEN 1000 MG: 500 TABLET, FILM COATED ORAL at 17:30

## 2023-01-19 RX ADMIN — SODIUM CHLORIDE, POTASSIUM CHLORIDE, SODIUM LACTATE AND CALCIUM CHLORIDE 125 ML/HR: 600; 310; 30; 20 INJECTION, SOLUTION INTRAVENOUS at 10:45

## 2023-01-19 RX ADMIN — AZITHROMYCIN 500 MG: 500 INJECTION, POWDER, LYOPHILIZED, FOR SOLUTION INTRAVENOUS at 10:45

## 2023-01-19 RX ADMIN — BUPIVACAINE HYDROCHLORIDE IN DEXTROSE 1.6 MG: 7.5 INJECTION, SOLUTION SUBARACHNOID at 12:04

## 2023-01-19 RX ADMIN — SODIUM CHLORIDE, POTASSIUM CHLORIDE, SODIUM LACTATE AND CALCIUM CHLORIDE 125 ML/HR: 600; 310; 30; 20 INJECTION, SOLUTION INTRAVENOUS at 16:31

## 2023-01-19 RX ADMIN — PHENYLEPHRINE HYDROCHLORIDE 100 MCG: 10 INJECTION INTRAVENOUS at 12:16

## 2023-01-19 RX ADMIN — FENTANYL CITRATE 25 MCG: 50 INJECTION, SOLUTION INTRAMUSCULAR; INTRAVENOUS at 12:04

## 2023-01-19 RX ADMIN — Medication 500 ML/HR: at 12:32

## 2023-01-19 RX ADMIN — ONDANSETRON 4 MG: 2 INJECTION INTRAMUSCULAR; INTRAVENOUS at 11:50

## 2023-01-19 RX ADMIN — ACETAMINOPHEN 1000 MG: 500 TABLET, FILM COATED ORAL at 10:08

## 2023-01-19 RX ADMIN — DEXAMETHASONE SODIUM PHOSPHATE 4 MG: 4 INJECTION, SOLUTION INTRAMUSCULAR; INTRAVENOUS at 11:50

## 2023-01-19 RX ADMIN — PHENYLEPHRINE HYDROCHLORIDE 100 MCG: 10 INJECTION INTRAVENOUS at 12:39

## 2023-01-19 RX ADMIN — ACETAMINOPHEN 1000 MG: 500 TABLET, FILM COATED ORAL at 23:53

## 2023-01-19 RX ADMIN — PHENYLEPHRINE HYDROCHLORIDE 50 MCG: 10 INJECTION INTRAVENOUS at 12:24

## 2023-01-19 NOTE — ANESTHESIA PREPROCEDURE EVALUATION
Anesthesia Evaluation     Patient summary reviewed and Nursing notes reviewed   NPO Solid Status: > 8 hours  NPO Liquid Status: > 8 hours           Airway   Mallampati: III  TM distance: >3 FB  Neck ROM: full  Possible difficult intubation  Dental - normal exam     Pulmonary - negative pulmonary ROS and normal exam   Cardiovascular - negative cardio ROS  Exercise tolerance: good (4-7 METS)    (-) hypertension      Neuro/Psych  (+) psychiatric history Depression,    GI/Hepatic/Renal/Endo    (+)  GERD,      Musculoskeletal (-) negative ROS    Abdominal  - normal exam   Substance History      OB/GYN    (+) Pregnant,   (-) Preeclampsia and history of pregnancy induced hypertension        Other - negative ROS       (-) arthritis                Anesthesia Plan    ASA 2     spinal       Anesthetic plan, risks, benefits, and alternatives have been provided, discussed and informed consent has been obtained with: patient.    Use of blood products discussed with patient  Consented to blood products.       CODE STATUS:

## 2023-01-19 NOTE — ANESTHESIA POSTPROCEDURE EVALUATION
Patient: Theresa ROA    Procedure Summary     Date: 23 Room / Location:  [facility] OR 3 /  [facility]    Anesthesia Start: 1150 Anesthesia Stop: 1323    Procedure:  SECTION REPEAT WITH TUBAL, (Bilateral: Abdomen) Diagnosis:       Previous  section      Fibroid of cervix      Encounter for sterilization      (Previous  section [Z98.891])      (Fibroid of cervix [D25.9])      (Encounter for sterilization [Z30.2])    Surgeons: Wilfredo Delcid MD Provider: Nemesio Hairston CRNA    Anesthesia Type: spinal ASA Status: 2          Anesthesia Type: spinal    Vitals  Vitals Value Taken Time   /65 23 1413   Temp 97.5 °F (36.4 °C) 23 1333   Pulse 65 23 1413   Resp 16 23 1413   SpO2 100 % 23 1413           Post Anesthesia Care and Evaluation    Patient location during evaluation: bedside  Patient participation: complete - patient participated  Level of consciousness: awake and alert  Pain score: 0  Pain management: adequate    Airway patency: patent  Anesthetic complications: No anesthetic complications  PONV Status: none  Cardiovascular status: acceptable  Respiratory status: acceptable  Hydration status: acceptable  Post Neuraxial Block status: No signs or symptoms of PDPH

## 2023-01-19 NOTE — OP NOTE
OPERATIVE REPORT    PROCEDURE: REPEAT LOW TRANSVERSE  SECTION, POSTPARTUM BILATERAL PARTIAL SALPINGECTOMY    PREOP DIAGNOSIS:  37w0d IUP, previous C/S, pedunculated fibroid, indicated sterilization    POSTOP DIAGNOSIS:  same    SURGEON:   Bhavin    ASSIST:  MD Harjit, responsible for retracting, & Assistant: Jenelle Ling CSA was responsible for performing the following activities: Retraction, Suction, Irrigation, Suturing, Closing, Placing Dressing and Delivery of Fetus and their skilled assistance was necessary for the success of this case.    ANESTHESIA:  spinal    EBL:   1000cc    IVFS:  1500cc    UO:  100cc    COMPLICATIONS:  none    FINDINGS:  1 live, viable male, Apgars: 8, 9, wt = 7-6 @ 12:30, small pedunculated fibroid    ANTIBIOTICS:  Kefzol, zithromax         DESCRIPTION OF THE PROCEDURE:      The patient was taken to the OR and placed on the table in the dorsosupine position.  Adequate spinal anesthesia was ensured.     Pt was prepped and draped.  IV antibiotics were given, time out was done.    A Pfannenstiel incision was made with a knife and carried down sharply till the fascia was encountered.  The fascia was scored in the midline and extended bilaterally.  The fascia was then dissected bluntly and sharply off the rectus muscle bellies in a superior and inferior direction. The muscles were divided in the midline and the preperitoneal tissue was picked up with hemostats, incised, the peritoneal cavity thus being entered.  This opening was extended bluntly.    A low transverse incision was made with a knife without developing the bladder flap and the amniotic cavity was entered.  There was clear amniotic fluid.  This opening was extended bluntly superiorly and inferiorly.    Pt was delivered of 1 live viable male from the cephalic position.  There was a loose nuchal cord.  Infant was completely delivered, bulb suctioned, cord doubly clamped and divided and infant handed to nurse in  attendance.  Cord blood was drawn, placenta delivered manually, intact w/ 3VC and was sent to pathology.    The uterus was exteriorized out of the abdominal cavity and wiped clean with a lap.  The uterine incision was reapproximated with 0 chromic in a running, interlocking stitch till completely hemostatic.  Any bleeders were bovied or oversewn.  The uterus was returned to the abdominal cavity and it was irrigated with copious amounts of warm water.  The uterine incision was reinspected and found to be completely hemostatic.    We could not feel the pedunculated fibroid from above to attempt to pull it up into the cavity.  Dr Lombardi examined the pt vaginally and said the fibroid was no larger than a walnut and was not bleeding, so he advised to leave in situ till postpartum and develop a plan then.    Both tubes and ovaries were normal, and the rest of the abdominal cavity was palpably normal.     The right fallopian tube was identified, traced out to the fimbriated end.  The fallopian tube was detached from the mesosalpinx with a hand-held harmonic scalpel; and there was minimal bleeding from this.  .  This was repeated on the left hand side in an identical fashion.    Both occlusions/removals appeared to be adequate for sterilization.    All instruments were removed. Before each level of closure, copious irrigation was carried out and hemostasis was ensured.     The urine was clear at the termination of the procedure.     The fascia was closed with 0 vicryl in a running stitch. The subcutaneous layer was closed by the assistant as was the skin.      Pt tolerated procedure well and went to the  in satis condition.  All sponge, instrument and needle counts were correct x 3 according to the operating room personnel.      Wilfredo Delcid MD  13:19 EST  01/19/23

## 2023-01-19 NOTE — H&P
" PREOPERATIVE HISTORY AND PHYSICAL      Patient Care Team:  Paris Maldonado APRN as PCP - General (Family Medicine)    Chief complaint: History of 2  sections, prolapsed cervical fibroid, request for permanent sterilization, AMA,    Pt is a 37 y.o.   Patient's last menstrual period was 2022 (exact date).     HPI:History of Present Illness  Pt is here for [facility]-recommended early RC/S, PPS due to prolapsed cervical fibroid.  [facility]'s specific recs:   \"Plan is for 37 week CS (due to the fibroid increasing risks of distending her cervix about 3-4 cm and having significant bleeding, PPROM, PTL/PTD), and hopeful for interval removal of fibroid at 6-8 weeks.  Would have endoloop, etc available in case bleeding from fibroid during delivery.\"      PMHx:   Past Medical History:   Diagnosis Date   • Anxiety    • Depression    • Gestational hypertension     with all pregnancies   • Preeclampsia 2022       Current problem list:  Patient Active Problem List   Diagnosis   • History of reversal of tubal ligation   • SMA carrier: Partner testing= FOB neg   • Fibroids   • Pregnancy   • History of 2  sections   • History of depression   • Hx of preeclampsia, prior pregnancy, currently pregnant   • Short interval between pregnancies affecting pregnancy in first trimester, antepartum   • Antepartum multigravida of advanced maternal age   • prolapsed fibroid of cervix   • Request for sterilization       PSHx:   Past Surgical History:   Procedure Laterality Date   •  SECTION     •  SECTION N/A 2022    Procedure:  SECTION REPEAT;  Surgeon: Paris Fitzgerald MD;  Location: [facility] LABOR DELIVERY;  Service: Obstetrics/Gynecology;  Laterality: N/A;   • CHOLECYSTECTOMY     • TUBAL ABDOMINAL LIGATION      and reversal   • TUBAL REANASTOMOSIS         Social Hx:   Social History     Socioeconomic History   • Marital status:    Tobacco Use   • Smoking status: Never   • " Smokeless tobacco: Never   Vaping Use   • Vaping Use: Never used   Substance and Sexual Activity   • Alcohol use: Never   • Drug use: Never   • Sexual activity: Yes     Partners: Male     Birth control/protection: None       FHx:   Family History   Problem Relation Age of Onset   • Diabetes Father    • Hypertension Father    • Heart disease Father    • Depression Mother    • Anxiety disorder Mother    • Breast cancer Neg Hx    • Ovarian cancer Neg Hx    • Uterine cancer Neg Hx    • Colon cancer Neg Hx    • Deep vein thrombosis Neg Hx    • Pulmonary embolism Neg Hx        Debilities/Disabilities Identified: None    Emotional Behavior: Appropriate    PGyn Hx:  otherwise noncontributory    POBHx:   OB History    Para Term  AB Living   6 4 4 0 1 3   SAB IAB Ectopic Molar Multiple Live Births   1 0 0 0 0 4      # Outcome Date GA Lbr Thai/2nd Weight Sex Delivery Anes PTL Lv   6 Current            5 Term 22 37w0d  2750 g (6 lb 1 oz) F CS-LTranv Spinal N SUE      Name: STEFFANIE JAQUEZ      Apgar1: 8  Apgar5: 9   4 SAB 2020 11w0d    SAB      3 Term 13 39w0d  3175 g (7 lb) M CS-LTranv EPI  SUE      Complications: Gestational hypertension   2 Term 02 40w0d  3062 g (6 lb 12 oz) M Vag-Spont EPI  SUE      Birth Comments: [facility]    1 Term 00 40w0d  2948 g (6 lb 8 oz) M Vag-Spont EPI  DEC      Birth Comments: [facility]       Obstetric Comments   - , term, 6.8#   2002- , term, 6.12#   2013- 1 LTCS, term 7.0 # - due to BP per pt   2022- RLTCS , 37 weeks, mild preeclampsia       Allergies: Patient has no known allergies.    Medications:   No medications prior to admission.                            No current facility-administered medications for this encounter.    Current Outpatient Medications:   •  aspirin 81 MG EC tablet, Take 1 tablet by mouth Daily., Disp: 30 tablet, Rfl: 2  •  ferrous gluconate (FERGON) 324 MG tablet, Take 1 tablet by mouth  Daily With Breakfast., Disp: 30 tablet, Rfl: 6  •  Prenatal Vit-Fe Fumarate-FA (Prenatal Vitamin) 27-0.8 MG tablet, Take 1 tablet by mouth Daily., Disp: 30 tablet, Rfl: 11        Review of Systems   Constitutional: Negative.    HENT: Negative.    Eyes: Negative.    Respiratory: Negative.    Cardiovascular: Negative.    Gastrointestinal: Negative.    Endocrine: Negative.    Genitourinary: Positive for vaginal pain.   Musculoskeletal: Negative.    Skin: Negative.    Allergic/Immunologic: Negative.    Neurological: Negative.    Hematological: Negative.    Psychiatric/Behavioral: Negative.        Vital Signs  LMP 2022 (Exact Date)     Physical Exam  Vitals and nursing note reviewed.   Constitutional:       Appearance: She is well-developed.   HENT:      Head: Normocephalic and atraumatic.   Cardiovascular:      Rate and Rhythm: Normal rate.   Pulmonary:      Effort: Pulmonary effort is normal.   Abdominal:      General: There is no distension.      Palpations: Abdomen is soft. There is no mass.      Tenderness: There is no abdominal tenderness. There is no guarding.   Genitourinary:     Vagina: No vaginal discharge.   Musculoskeletal:         General: No tenderness or deformity. Normal range of motion.      Cervical back: Normal range of motion.   Skin:     General: Skin is warm and dry.      Coloration: Skin is not pale.      Findings: No erythema or rash.   Neurological:      Mental Status: She is alert and oriented to person, place, and time.   Psychiatric:         Behavior: Behavior normal.         Thought Content: Thought content normal.         Judgment: Judgment normal.             IMPRESSION:    History of 2  sections, prolapsed cervical fibroid, request for permanent sterilization, AMA,                                       PLAN:    Procedure(s):   SECTION REPEAT WITH TUBAL,  possible removal of cervical fibroid, possible  hysterectomy    RISKS, ALTERNATIVES, COMPLICATIONS OF THE  PROCEDURE INCLUDING BUT NOT LIMITED TO:    INTRAOPERATIVE RISKS: INJURY TO INTERNAL AND ADJACENT ORGANS AND STRUCTURES (BOWEL, BLADDER, URETER,BLOOD VESSELS) OR HEMORRHAGE REQUIRING FURTHER SURGERY (LAPAROTOMY),  POSSIBLE NON-DIAGNOSTIC FINDINGS, DISCOVERY OF POSSIBLE MALIGNANCY, INFECTION, AND DEATH;   POSTOP COMPLICATIONS: BLEEDING, INFECTION (REQUIRING POSSIBLE REOPERATION), FAILURE OF GOAL OF SURGERY AND RECURRENCE OF ORIGINAL SYMPTOMS, PNEUMONIA, PULMONARY EMBOLISM, AND DEATH;  WERE EXPLAINED TO THE PT WHO VERBALIZED HER UNDERSTANDING.             I discussed the patients findings and my recommendations with patient and family.     Wilfredo Delcid MD  01/18/23  19:48 EST

## 2023-01-19 NOTE — L&D DELIVERY NOTE
[facility]   Vaginal Delivery Note    Patient Name: Theresa ROA  : 1985  MRN: [id number]    Date of Delivery: 2023     Diagnosis     Pre & Post-Delivery:  Intrauterine pregnancy at 37w0d  Labor status:  Without Labor      delivery delivered    prolapsed fibroid of cervix    Request for sterilization    Previous  section             Problem List    Transfer to Postpartum     Review the Delivery Report for details.     Delivery     Delivery: , Low Transverse     YOB: 2023    Time of Birth:  Gestational Age 12:30 PM   37w0d     Anesthesia: Spinal     Delivering clinician: Wilfredo Delcid    Forceps?   No   Vacuum? Yes  Vacuum Delivery  Vacuum attempted? No     Vacuum indication:     Vacuum type: Kiwi    Application location:     First Attempt     Time applied:     Time removed:     Second Attempt    Time applied:     Time removed:     Third Attempt    Time applied:     Time removed:     Number of pulls: 1    Number of pop-offs: 0    Low-end pressure range:     High-end pressure range:     Total application time:     Applied by: DR DELCID    Failed? No        Shoulder dystocia present: No        Delivery narrative:    PROCEDURE: REPEAT LOW TRANSVERSE  SECTION, POSTPARTUM BILATERAL PARTIAL SALPINGECTOMY     PREOP DIAGNOSIS:  37w0d IUP, previous C/S, pedunculated fibroid, indicated sterilization     POSTOP DIAGNOSIS:  same     SURGEON:   Bhavin     ASSIST:  MD Harjit, responsible for retracting, & Assistant: Jenelle Ling CSA was responsible for performing the following activities: Retraction, Suction, Irrigation, Suturing, Closing, Placing Dressing and Delivery of Fetus and their skilled assistance was necessary for the success of this case.     ANESTHESIA:  spinal     EBL:   1000cc     IVFS:  1500cc     UO:  100cc     COMPLICATIONS:  none     FINDINGS:  1 live, viable male, Apgars: 8, 9, wt = 7-6 @ 12:30, small  pedunculated fibroid     ANTIBIOTICS:  Kefzol, zithromax                      DESCRIPTION OF THE PROCEDURE:       The patient was taken to the OR and placed on the table in the dorsosupine position.  Adequate spinal anesthesia was ensured.      Pt was prepped and draped.  IV antibiotics were given, time out was done.     A Pfannenstiel incision was made with a knife and carried down sharply till the fascia was encountered.  The fascia was scored in the midline and extended bilaterally.  The fascia was then dissected bluntly and sharply off the rectus muscle bellies in a superior and inferior direction. The muscles were divided in the midline and the preperitoneal tissue was picked up with hemostats, incised, the peritoneal cavity thus being entered.  This opening was extended bluntly.     A low transverse incision was made with a knife without developing the bladder flap and the amniotic cavity was entered.  There was clear amniotic fluid.  This opening was extended bluntly superiorly and inferiorly.     Pt was delivered of 1 live viable male from the cephalic position.  There was a loose nuchal cord.  Infant was completely delivered, bulb suctioned, cord doubly clamped and divided and infant handed to nurse in attendance.  Cord blood was drawn, placenta delivered manually, intact w/ 3VC and was sent to pathology.     The uterus was exteriorized out of the abdominal cavity and wiped clean with a lap.  The uterine incision was reapproximated with 0 chromic in a running, interlocking stitch till completely hemostatic.  Any bleeders were bovied or oversewn.  The uterus was returned to the abdominal cavity and it was irrigated with copious amounts of warm water.  The uterine incision was reinspected and found to be completely hemostatic.     Both tubes and ovaries were normal, and the rest of the abdominal cavity was palpably normal.      The right fallopian tube was identified, traced out to the fimbriated end.    The  fallopian tube was detached from the mesosalpinx with a hand-held harmonic scalpel; and there was minimal bleeding from this. This was repeated on the left hand side in an identical fashion.    Both occlusions appeared to be adequate for sterilization.    All instruments were removed. Before each level of closure, copious irrigation was carried out and hemostasis was ensured.      The urine was clear at the termination of the procedure.      The fascia was closed with 0 vicryl in a running stitch. The subcutaneous layer was closed by the assistant as was the skin.       Pt tolerated procedure well and went to the  in satis condition.  All sponge, instrument and needle counts were correct x 3 according to the operating room personnel.        Infant     Findings: male  infant     Infant observations: Weight: 3345 g (7 lb 6 oz)   Length: 19  in  Observations/Comments:        Apgars: 8  @ 1 minute /    9  @ 5 minutes   Infant Name:      Placenta & Cord         Placenta delivered  Spontaneous  at   1/19/2023 12:31 PM     Cord: 3 vessels  present.   Nuchal Cord?  yes; Number of nuchal loops present:  1    Cord blood obtained: Yes    Cord gases obtained:  No    Cord gas results: Venous:  No results found for: PHCVEN    Arterial:  No results found for: PHCART     Repair      Episiotomy: None     No    Lacerations: No   Estimated Blood Loss:  1000cc     Quantitative Blood Loss:          Complications     none    Disposition     Mother to Mother Baby/Postpartum  in stable condition currently.  Baby to NBN  in stable condition currently.    Wilfredo Delcid MD  01/19/23  13:44 EST

## 2023-01-19 NOTE — PLAN OF CARE
Problem: Adult Inpatient Plan of Care  Goal: Plan of Care Review  Outcome: Ongoing, Progressing  Goal: Patient-Specific Goal (Individualized)  Outcome: Ongoing, Progressing  Flowsheets (Taken 1/19/2023 1022)  Anxieties, Fears or Concerns: none  Goal: Absence of Hospital-Acquired Illness or Injury  Outcome: Ongoing, Progressing  Intervention: Identify and Manage Fall Risk  Recent Flowsheet Documentation  Taken 1/19/2023 1601 by Shilpa Doss RN  Safety Promotion/Fall Prevention:   safety round/check completed   room organization consistent   nonskid shoes/slippers when out of bed   clutter free environment maintained   assistive device/personal items within reach   activity supervised  Taken 1/19/2023 1520 by Shilpa Doss RN  Safety Promotion/Fall Prevention: safety round/check completed  Taken 1/19/2023 1434 by Shilpa Doss RN  Safety Promotion/Fall Prevention: safety round/check completed  Taken 1/19/2023 1330 by Shilpa Doss RN  Safety Promotion/Fall Prevention: safety round/check completed  Taken 1/19/2023 1018 by Shilpa Doss RN  Safety Promotion/Fall Prevention:   safety round/check completed   room organization consistent   nonskid shoes/slippers when out of bed   clutter free environment maintained   assistive device/personal items within reach   activity supervised  Intervention: Prevent Skin Injury  Recent Flowsheet Documentation  Taken 1/19/2023 1601 by Shilpa Doss RN  Body Position: supine  Taken 1/19/2023 1018 by Shilpa Doss RN  Body Position: supine  Intervention: Prevent and Manage VTE (Venous Thromboembolism) Risk  Recent Flowsheet Documentation  Taken 1/19/2023 1601 by Shilpa Doss RN  Activity Management: activity adjusted per tolerance  VTE Prevention/Management:   bilateral   sequential compression devices on  Taken 1/19/2023 1018 by Shilpa Doss RN  Activity Management: up ad vidhi  Intervention: Prevent Infection  Recent Flowsheet  Documentation  Taken 2023 1601 by Shilpa Doss RN  Infection Prevention:   cohorting utilized   environmental surveillance performed   equipment surfaces disinfected   hand hygiene promoted   personal protective equipment utilized   rest/sleep promoted   single patient room provided   visitors restricted/screened  Taken 2023 1018 by Shilpa Doss RN  Infection Prevention:   cohorting utilized   environmental surveillance performed   equipment surfaces disinfected   hand hygiene promoted   personal protective equipment utilized   rest/sleep promoted   single patient room provided   visitors restricted/screened  Goal: Optimal Comfort and Wellbeing  Outcome: Ongoing, Progressing  Intervention: Provide Person-Centered Care  Recent Flowsheet Documentation  Taken 2023 1601 by Shilpa Doss RN  Trust Relationship/Rapport:   care explained   emotional support provided   choices provided   empathic listening provided   questions answered   questions encouraged   reassurance provided   thoughts/feelings acknowledged  Taken 2023 1018 by Shilpa Doss RN  Trust Relationship/Rapport:   care explained   choices provided   emotional support provided   empathic listening provided   questions answered   questions encouraged   reassurance provided   thoughts/feelings acknowledged  Goal: Readiness for Transition of Care  Outcome: Ongoing, Progressing  Intervention: Mutually Develop Transition Plan  Recent Flowsheet Documentation  Taken 2023 1019 by Shilpa Doss, RN  Equipment Currently Used at Home: none  Taken 2023 1016 by Shilpa Doss RN  Transportation Anticipated: car, drives self  Patient/Family Anticipated Services at Transition: none  Patient/Family Anticipates Transition to: home with family     Problem: Adjustment to Role Transition (Postpartum  Delivery)  Goal: Successful Maternal Role Transition  Outcome: Ongoing, Progressing  Intervention: Support  Maternal Role Transition  Recent Flowsheet Documentation  Taken 2023 1601 by Shilpa Doss RN  Supportive Measures:   active listening utilized   decision-making supported   goal-setting facilitated   positive reinforcement provided   relaxation techniques promoted   self-care encouraged   verbalization of feelings encouraged  Parent/Child Attachment Promotion:   caring behavior modeled   cue recognition promoted   face-to-face positioning promoted   interaction encouraged   parent/caregiver presence encouraged   positive reinforcement provided   participation in care promoted   rooming-in promoted     Problem: Bleeding (Postpartum  Delivery)  Goal: Hemostasis  Outcome: Ongoing, Progressing     Problem: Infection (Postpartum  Delivery)  Goal: Absence of Infection Signs and Symptoms  Outcome: Ongoing, Progressing  Intervention: Prevent or Manage Infection  Recent Flowsheet Documentation  Taken 2023 1601 by Shilpa Doss RN  Infection Management: aseptic technique maintained     Problem: Pain (Postpartum  Delivery)  Goal: Acceptable Pain Control  Outcome: Ongoing, Progressing     Problem: Postoperative Nausea and Vomiting (Postpartum  Delivery)  Goal: Nausea and Vomiting Relief  Outcome: Ongoing, Progressing     Problem: Postoperative Urinary Retention (Postpartum  Delivery)  Goal: Effective Urinary Elimination  Outcome: Ongoing, Progressing  Intervention: Monitor and Manage Urinary Retention  Recent Flowsheet Documentation  Taken 2023 1601 by Shilpa Doss RN  Urinary Elimination Promotion: catheter patency maintained   Goal Outcome Evaluation:      VSS, fundus firm light bleeding, incision wdl, pt denies pain at this time, plan to remove fibroid postpartum, bonding well w/ baby.

## 2023-01-19 NOTE — ANESTHESIA PROCEDURE NOTES
Intrathecal Block      Start Time: 1/19/2023 11:55 AM  Stop Time: 1/19/2023 12:04 PM  Indication:at surgeon's request, post-op pain management, procedure for pain and labor analgesia   Performed By  CRNA/SKYLER: Nemesio Hairston CRNA  Preanesthetic Checklist  Completed: patient identified, site marked, surgical consent, pre-op evaluation, timeout performed, IV checked, risks and benefits discussed and monitors and equipment checked  Intrathecal Block Prep:  Pt Position:sitting  Sterile Tech:cap, gloves, mask and sterile barrier  Prep:Betadine  Monitoring:blood pressure monitoring, continuous pulse oximetry and EKG  Intrathecal Block Procedure:  Approach:midline  Guidance:landmark technique  Location:L3-L4  Needle Type:Samuel  Needle Gauge:24 G  Placement of Needle Event: cerebrospinal fluid  Fluid Appearance:clear  Post Assessment:  Patient Tolerance:patient tolerated the procedure well with no apparent complications  Complications:no

## 2023-01-20 LAB
BASOPHILS # BLD AUTO: 0.04 10*3/MM3 (ref 0–0.2)
BASOPHILS NFR BLD AUTO: 0.4 % (ref 0–1.5)
DEPRECATED RDW RBC AUTO: 41.4 FL (ref 37–54)
EOSINOPHIL # BLD AUTO: 0.12 10*3/MM3 (ref 0–0.4)
EOSINOPHIL NFR BLD AUTO: 1.2 % (ref 0.3–6.2)
ERYTHROCYTE [DISTWIDTH] IN BLOOD BY AUTOMATED COUNT: 13.5 % (ref 12.3–15.4)
HCT VFR BLD AUTO: 31.6 % (ref 34–46.6)
HGB BLD-MCNC: 10.5 G/DL (ref 12–15.9)
IMM GRANULOCYTES # BLD AUTO: 0.05 10*3/MM3 (ref 0–0.05)
IMM GRANULOCYTES NFR BLD AUTO: 0.5 % (ref 0–0.5)
LYMPHOCYTES # BLD AUTO: 2.87 10*3/MM3 (ref 0.7–3.1)
LYMPHOCYTES NFR BLD AUTO: 27.7 % (ref 19.6–45.3)
MCH RBC QN AUTO: 28.6 PG (ref 26.6–33)
MCHC RBC AUTO-ENTMCNC: 33.2 G/DL (ref 31.5–35.7)
MCV RBC AUTO: 86.1 FL (ref 79–97)
MONOCYTES # BLD AUTO: 0.84 10*3/MM3 (ref 0.1–0.9)
MONOCYTES NFR BLD AUTO: 8.1 % (ref 5–12)
NEUTROPHILS NFR BLD AUTO: 6.44 10*3/MM3 (ref 1.7–7)
NEUTROPHILS NFR BLD AUTO: 62.1 % (ref 42.7–76)
NRBC BLD AUTO-RTO: 0 /100 WBC (ref 0–0.2)
PLATELET # BLD AUTO: 191 10*3/MM3 (ref 140–450)
PMV BLD AUTO: 10.5 FL (ref 6–12)
RBC # BLD AUTO: 3.67 10*6/MM3 (ref 3.77–5.28)
WBC NRBC COR # BLD: 10.36 10*3/MM3 (ref 3.4–10.8)

## 2023-01-20 PROCEDURE — 25010000002 KETOROLAC TROMETHAMINE PER 15 MG: Performed by: OBSTETRICS & GYNECOLOGY

## 2023-01-20 PROCEDURE — 0503F POSTPARTUM CARE VISIT: CPT | Performed by: NURSE PRACTITIONER

## 2023-01-20 PROCEDURE — 85025 COMPLETE CBC W/AUTO DIFF WBC: CPT | Performed by: OBSTETRICS & GYNECOLOGY

## 2023-01-20 RX ADMIN — IBUPROFEN 600 MG: 600 TABLET ORAL at 20:11

## 2023-01-20 RX ADMIN — Medication 324 MG: at 07:56

## 2023-01-20 RX ADMIN — KETOROLAC TROMETHAMINE 15 MG: 30 INJECTION, SOLUTION INTRAMUSCULAR; INTRAVENOUS at 02:42

## 2023-01-20 RX ADMIN — KETOROLAC TROMETHAMINE 15 MG: 30 INJECTION, SOLUTION INTRAMUSCULAR; INTRAVENOUS at 07:56

## 2023-01-20 RX ADMIN — DOCUSATE SODIUM 100 MG: 100 CAPSULE ORAL at 08:00

## 2023-01-20 RX ADMIN — OXYCODONE HYDROCHLORIDE 5 MG: 5 TABLET ORAL at 20:07

## 2023-01-20 RX ADMIN — KETOROLAC TROMETHAMINE 15 MG: 30 INJECTION, SOLUTION INTRAMUSCULAR; INTRAVENOUS at 14:10

## 2023-01-20 RX ADMIN — POLYETHYLENE GLYCOL 3350 17 G: 17 POWDER, FOR SOLUTION ORAL at 08:00

## 2023-01-20 RX ADMIN — ACETAMINOPHEN 1000 MG: 500 TABLET, FILM COATED ORAL at 06:08

## 2023-01-20 RX ADMIN — PRENATAL VIT W/ FE FUMARATE-FA TAB 27-0.8 MG 1 TABLET: 27-0.8 TAB at 08:00

## 2023-01-20 RX ADMIN — ACETAMINOPHEN 1000 MG: 500 TABLET, FILM COATED ORAL at 10:59

## 2023-01-20 RX ADMIN — DOCUSATE SODIUM 100 MG: 100 CAPSULE ORAL at 20:05

## 2023-01-20 RX ADMIN — ACETAMINOPHEN 650 MG: 325 TABLET ORAL at 16:56

## 2023-01-20 NOTE — PROGRESS NOTES
[facility]   PROGRESS NOTE    Post-Op Day 1 S/P   Subjective   Subjective  Patient reports:  Pain is well controlled with Toradol.  She is ambulating well. Tolerating diet. Tolerating po -- normal.  Intake -- c/o of tolerating po solids and tolerating po liquids.   Voiding - without difficulty; no flatus  reported..  Vaginal bleeding is as much as expected.    Objective    Objective     Vitals: Vital Signs Range for the last 24 hours  Temperature: Temp:  [97.5 °F (36.4 °C)-98.3 °F (36.8 °C)] 98 °F (36.7 °C)   Temp Source: Temp src: Oral   BP: BP: (100-137)/(42-74) 121/69   Pulse: Heart Rate:  [51-80] 65   Respirations: Resp:  [13-20] 18   SPO2: SpO2:  [97 %-100 %] 98 %   O2 Amount (l/min):     O2 Devices Device (Oxygen Therapy): room air   Weight:              Physical Exam    Lungs clear to auscultation bilaterally   Abdomen Soft, non-tender, normal bowel sounds; no bruits, organomegaly or masses.   Incision  healing well, dressing dry and intact   Extremities extremities normal, atraumatic, no cyanosis or edema and Homans sign is negative, no sign of DVT     I reviewed the patient's new clinical results.    Assessment & Plan         delivery delivered    prolapsed fibroid of cervix    Request for sterilization    Previous  section    Assessment & Plan    Assessment:    Theresa ROA is 1)  Day 1  post-partum  , Low Transverse   . Rh+.  Hgb 10.5 g/dl    2) postpartum care - advance    3) anemia - continue iron supplement    3) boy - bottle; desires circ    4) Disp - plan home tomorrow or Saturday      .      Plan:  remove dressing, saline lock IV fluids, advance diet  normal diet as tolerated and continue post op care.      Anne Clark, APRN  23  10:18 EST

## 2023-01-20 NOTE — PROGRESS NOTES
Patient: Theresa ROA    @A@    Anesthesia Type: spinal  Patient location: Labor and Delivery  Last vitals  BP      Temp      Pulse     Resp      SpO2        Post vital signs: stable  Level of consciousness: awake, alert and oriented    Post-anesthesia pain: adequate analgesia  Airway patency: patent  Respiratory: unassisted  Cardiovascular: stable and blood pressure at baseline  Hydration: euvolemic    Difficult Airway: no  Anesthetic complications: no

## 2023-01-20 NOTE — PLAN OF CARE
Goal Outcome Evaluation:  Plan of Care Reviewed With: patient        Progress: improving  Outcome Evaluation: vss, pain well controlled with ERAS protocol, fundus and lochia WDL, up ad vidhi, bonding well with

## 2023-01-21 VITALS
HEART RATE: 80 BPM | SYSTOLIC BLOOD PRESSURE: 126 MMHG | RESPIRATION RATE: 18 BRPM | BODY MASS INDEX: 46.07 KG/M2 | OXYGEN SATURATION: 98 % | TEMPERATURE: 97.9 F | DIASTOLIC BLOOD PRESSURE: 65 MMHG | WEIGHT: 244 LBS | HEIGHT: 61 IN

## 2023-01-21 PROCEDURE — 0503F POSTPARTUM CARE VISIT: CPT | Performed by: OBSTETRICS & GYNECOLOGY

## 2023-01-21 RX ORDER — OXYCODONE HYDROCHLORIDE 5 MG/1
5 TABLET ORAL EVERY 4 HOURS PRN
Qty: 30 TABLET | Refills: 0 | Status: SHIPPED | OUTPATIENT
Start: 2023-01-21 | End: 2023-03-28

## 2023-01-21 RX ORDER — ACETAMINOPHEN 325 MG/1
650 TABLET ORAL EVERY 6 HOURS
Qty: 40 TABLET | Refills: 0 | Status: SHIPPED | OUTPATIENT
Start: 2023-01-21 | End: 2023-01-26

## 2023-01-21 RX ORDER — PSEUDOEPHEDRINE HCL 30 MG
100 TABLET ORAL 2 TIMES DAILY
Qty: 30 CAPSULE | Refills: 0 | Status: SHIPPED | OUTPATIENT
Start: 2023-01-21 | End: 2023-03-28

## 2023-01-21 RX ORDER — IBUPROFEN 600 MG/1
600 TABLET ORAL EVERY 6 HOURS
Qty: 30 TABLET | Refills: 0 | Status: SHIPPED | OUTPATIENT
Start: 2023-01-21 | End: 2023-03-28

## 2023-01-21 RX ADMIN — Medication 324 MG: at 08:48

## 2023-01-21 RX ADMIN — ACETAMINOPHEN 650 MG: 325 TABLET ORAL at 06:03

## 2023-01-21 RX ADMIN — IBUPROFEN 600 MG: 600 TABLET ORAL at 02:11

## 2023-01-21 RX ADMIN — ACETAMINOPHEN 650 MG: 325 TABLET ORAL at 00:11

## 2023-01-21 RX ADMIN — DOCUSATE SODIUM 100 MG: 100 CAPSULE ORAL at 08:48

## 2023-01-21 RX ADMIN — OXYCODONE HYDROCHLORIDE 10 MG: 5 TABLET ORAL at 08:47

## 2023-01-21 RX ADMIN — PRENATAL VIT W/ FE FUMARATE-FA TAB 27-0.8 MG 1 TABLET: 27-0.8 TAB at 08:48

## 2023-01-21 RX ADMIN — POLYETHYLENE GLYCOL 3350 17 G: 17 POWDER, FOR SOLUTION ORAL at 08:47

## 2023-01-21 RX ADMIN — IBUPROFEN 600 MG: 600 TABLET ORAL at 08:48

## 2023-01-21 RX ADMIN — OXYCODONE HYDROCHLORIDE 10 MG: 5 TABLET ORAL at 00:11

## 2023-01-21 NOTE — PLAN OF CARE
Problem: Adult Inpatient Plan of Care  Goal: Plan of Care Review  Outcome: Met  Goal: Patient-Specific Goal (Individualized)  Outcome: Met  Goal: Absence of Hospital-Acquired Illness or Injury  Outcome: Met  Intervention: Identify and Manage Fall Risk  Recent Flowsheet Documentation  Taken 2023 7064 by Sheridan Roman, RN  Safety Promotion/Fall Prevention: safety round/check completed  Goal: Optimal Comfort and Wellbeing  Outcome: Met  Goal: Readiness for Transition of Care  Outcome: Met     Problem: Adjustment to Role Transition (Postpartum  Delivery)  Goal: Successful Maternal Role Transition  Outcome: Met     Problem: Bleeding (Postpartum  Delivery)  Goal: Hemostasis  Outcome: Met     Problem: Infection (Postpartum  Delivery)  Goal: Absence of Infection Signs and Symptoms  Outcome: Met     Problem: Pain (Postpartum  Delivery)  Goal: Acceptable Pain Control  Outcome: Met     Problem: Postoperative Nausea and Vomiting (Postpartum  Delivery)  Goal: Nausea and Vomiting Relief  Outcome: Met     Problem: Postoperative Urinary Retention (Postpartum  Delivery)  Goal: Effective Urinary Elimination  Outcome: Met   Goal Outcome Evaluation:

## 2023-01-21 NOTE — DISCHARGE SUMMARY
"Obstetrical Discharge Form    Primary OB Clinician: [facility]      Gestational Age: 37w0d    Antepartum complications: Previous  delivery x2, prolapsed fibroid of cervix, history of tubal reversal, desires permanent sterilization    Date of Delivery:  2023     Delivered By: Wilfredo Delcid     Delivery Type: repeat  section, low transverse incision, bilateral partial salpingectomy    Tubal Ligation: done with c/section    Baby: Liveborn male, Apgars 8/9, weight 7 #, 6 oz    Anesthesia: spinal    Intrapartum complications: None    Laceration: n/a      Feeding method: bottle - Similac Advance      Discharge Date: 2023; Discharge Time: 10:40 EST    /65 (BP Location: Right arm, Patient Position: Sitting)   Pulse 80   Temp 97.9 °F (36.6 °C) (Oral)   Resp 18   Ht 154.9 cm (61\")   Wt 111 kg (244 lb)   LMP 2022 (Exact Date)   SpO2 98%   Breastfeeding Yes   BMI 46.10 kg/m²      Abd: Soft, fundus firm, nontender, bandage dry.  Ext: No cords  Results from last 7 days   Lab Units 23  0634   HEMOGLOBIN g/dL 10.5*       IMP/DDX: IUP at 37 weeks, 2 previous  deliveries, prolapsed cervical fibroid, desires permanent sterilization      Plan:    Patient given written instruction sheet.  Follow-up appointment with Dr Delcid in 2 weeks.    Murray Sheikh MD  10:40 EST  2023  "

## 2023-01-21 NOTE — SIGNIFICANT NOTE
Discharge instructions reviewed with patient and spouse. She verbalized understanding and questions answered. Patient ambulated to exit without any distress. Patient verbalized she wanted instructions written and taught in english.

## 2023-01-23 LAB
LAB AP CASE REPORT: NORMAL
PATH REPORT.FINAL DX SPEC: NORMAL
PATH REPORT.GROSS SPEC: NORMAL

## 2023-01-24 NOTE — CASE MANAGEMENT/SOCIAL WORK
Case Management Discharge Note      Final Note: discharged home.         Selected Continued Care - Discharged on 1/21/2023 Admission date: 1/19/2023 - Discharge disposition: Home or Self Care    Destination    No services have been selected for the patient.              Durable Medical Equipment    No services have been selected for the patient.              Dialysis/Infusion    No services have been selected for the patient.              Home Medical Care    No services have been selected for the patient.              Therapy    No services have been selected for the patient.              Community Resources    No services have been selected for the patient.              Community & DME    No services have been selected for the patient.                       Final Discharge Disposition Code: 01 - home or self-care

## 2023-01-25 LAB
LAB AP CASE REPORT: NORMAL
PATH REPORT.FINAL DX SPEC: NORMAL

## 2023-02-03 ENCOUNTER — POSTPARTUM VISIT (OUTPATIENT)
Dept: OBSTETRICS AND GYNECOLOGY | Facility: CLINIC | Age: 38
End: 2023-02-03
Payer: COMMERCIAL

## 2023-02-03 VITALS
DIASTOLIC BLOOD PRESSURE: 78 MMHG | SYSTOLIC BLOOD PRESSURE: 122 MMHG | WEIGHT: 235.4 LBS | HEIGHT: 61 IN | BODY MASS INDEX: 44.45 KG/M2

## 2023-02-03 DIAGNOSIS — Z13.89 SCREENING FOR GENITOURINARY CONDITION: Primary | ICD-10-CM

## 2023-02-03 PROCEDURE — 0503F POSTPARTUM CARE VISIT: CPT | Performed by: OBSTETRICS & GYNECOLOGY

## 2023-02-03 NOTE — PROGRESS NOTES
"POSTOP INCISION CHECK    S:  Doing well, no complaints.  Breast and bottle.  Lochia scant.  No pp depression.  Bowel and bladder function normal.  Here for staple removal.    O:  /78   Ht 154.9 cm (61\")   Wt 107 kg (235 lb 6.4 oz)   LMP 05/05/2022 (Exact Date)   Breastfeeding Yes   BMI 44.48 kg/m²     Brief Urine Lab Results  (Last result in the past 365 days)      Color   Clarity   Blood   Leuk Est   Nitrite   Protein   CREAT   Urine HCG        02/03/23 0934 Yellow   Clear   Large   Negative   Negative   Negative                 Inc C/D/I.  Clips removed, steri strips applied.  Exam neg.     A:  Doing well postop C/S    P:   Routine pp care.  RTO 4 weeks.     Wilfredo Delcid MD  09:41 EST  02/03/23      "

## 2023-03-02 NOTE — DISCHARGE SUMMARY
Obstetrical Discharge Form    Primary OB Clinician: [facility]      Preadmission Diagnosis:  1. Theresa Nagy is a 36 y.o.  with IUP @ 37w0d   2. Previous C/S, declines   3. Mild preeclampsia  4. AMA  5. SMA carrier, FOB negative  6. H/O tubal reversal  7. Iron deficiency anemia    Discharge Diagnosis:  Same, plus:   8. S/P RLTCS    Antepartum complications: mild preeclampsia    Date of Delivery:  2022     Delivered By: Paris Fitzgerald     Delivery Type: repeat  section, low transverse incision    Tubal Ligation: n/a    Baby: Liveborn female, Apgars 8/9, weight 6 #, 1 oz,  Rose  97  oz    Anesthesia: spinal    Intrapartum complications: None    Laceration: n/a    Feeding method: both breast and bottle -     Hospital Course: Theresa Nagy is a 36 y.o.   who presented with an IUP 37w0d presented for a RLTCS for mild preeclampsia. She had an uncomplicated operative course and received Mag sulfate and diuresed > 4 L. Her labetalol was held and her BPs remained in the high normal range. She is to take her BP BID and bring her log into the office next week for review. She is ambulating, tolerating a regular diet, has good control of her pain and desires discharge home today. She plans on Micronor for contraception and will start in 2 weeks.     Discharge Date: 2022; Discharge Time: 12:03 EST    Review of Systems   Constitutional: Positive for activity change.   Gastrointestinal: Positive for abdominal pain.   Genitourinary: Positive for vaginal bleeding.   Psychiatric/Behavioral: Positive for sleep disturbance.   All other systems reviewed and are negative.    /81 (BP Location: Left arm, Patient Position: Sitting)   Pulse 60   Temp 97.7 °F (36.5 °C) (Oral)   Resp 16   Wt 111 kg (245 lb)   LMP 2021 (Exact Date)   SpO2 98%   Breastfeeding Yes   BMI 46.29 kg/m²      Physical Exam  Vitals and nursing note reviewed.   Constitutional:       Appearance: Normal appearance. She  is well-developed. She is obese.   HENT:      Head: Normocephalic and atraumatic.   Eyes:      General: No scleral icterus.     Conjunctiva/sclera: Conjunctivae normal.   Neck:      Thyroid: No thyromegaly.   Abdominal:      General: There is no distension.      Palpations: Abdomen is soft. There is no mass.      Tenderness: There is no abdominal tenderness. There is no guarding or rebound.      Hernia: No hernia is present.      Comments: Inc C/D/I, some ecchymosis noted   Musculoskeletal:      Right lower leg: Edema present.      Left lower leg: Edema (1+ edema to midcalf) present.   Skin:     General: Skin is warm and dry.   Neurological:      Mental Status: She is alert and oriented to person, place, and time.   Psychiatric:         Mood and Affect: Mood normal.         Behavior: Behavior normal.         Thought Content: Thought content normal.         Judgment: Judgment normal.         Results from last 7 days   Lab Units 01/08/22  0619   HEMOGLOBIN g/dL 10.4*     Infant: female  Feeding:both  Rh status: positive, Rhogam was not indicated  Rubella: Immune  Diabetes: no  Contraception: oral progesterone-only contraceptive        Plan:    Patient given written instruction sheet.  Follow-up appointment with [facility] in 3 days.    Discharge time was less than 30 minutes.     Paris Fitzgerald MD  12:03 EST  1/9/2022     no [Negative] : Heme/Lymph [Negative] : Heme/Lymph

## 2023-03-26 PROBLEM — Z34.90 PREGNANCY: Status: RESOLVED | Noted: 2021-12-23 | Resolved: 2023-03-26

## 2023-03-26 PROBLEM — Z98.891 HISTORY OF 2 CESAREAN SECTIONS: Status: RESOLVED | Noted: 2021-12-23 | Resolved: 2023-03-26

## 2023-03-26 PROBLEM — O09.891 SHORT INTERVAL BETWEEN PREGNANCIES AFFECTING PREGNANCY IN FIRST TRIMESTER, ANTEPARTUM: Status: RESOLVED | Noted: 2022-07-01 | Resolved: 2023-03-26

## 2023-03-26 PROBLEM — Z98.890 HISTORY OF REVERSAL OF TUBAL LIGATION: Status: RESOLVED | Noted: 2021-05-05 | Resolved: 2023-03-26

## 2023-03-26 PROBLEM — Z30.2 REQUEST FOR STERILIZATION: Status: RESOLVED | Noted: 2023-01-06 | Resolved: 2023-03-26

## 2023-03-26 PROBLEM — O09.299 HX OF PREECLAMPSIA, PRIOR PREGNANCY, CURRENTLY PREGNANT: Status: RESOLVED | Noted: 2022-07-01 | Resolved: 2023-03-26

## 2023-03-26 PROBLEM — Z14.8 GENETIC CARRIER: Status: RESOLVED | Noted: 2021-07-21 | Resolved: 2023-03-26

## 2023-03-26 PROBLEM — O09.529 ANTEPARTUM MULTIGRAVIDA OF ADVANCED MATERNAL AGE: Status: RESOLVED | Noted: 2022-10-27 | Resolved: 2023-03-26

## 2023-03-27 NOTE — PROGRESS NOTES
"EVALUATION AND MANAGEMENT ENCOUNTER    S:  Chief Complaint   Patient presents with   • Fibroids       HPI:  Theresa ROA is a 38 y.o.  with Patient's last menstrual period was 2023 (exact date). here to discuss plan.  Pt still has the prolapsed fibroid that she had during her pregnancy.  It did not shrink.  Pt has bleeding and pain with it and can feel it.  awkard during sex.  Pt would like removal. PT IS UP TO DATE ON HER PAP SMEARS.     Review of Systems:    Patient reports that she is not currently experiencing any symptoms of urinary incontinence.      noTESTED FOR CHLAMYDIA?    .CESSATIONOPT    Vital Signs: /88   Ht 154.9 cm (60.98\")   Wt 104 kg (230 lb)   LMP 2023 (Exact Date)   Breastfeeding No   BMI 43.48 kg/m²      Brief Urine Lab Results  (Last result in the past 365 days)      Color   Clarity   Blood   Leuk Est   Nitrite   Protein   CREAT   Urine HCG        23 1345 Yellow   Clear   Trace  Comment: period   Negative   Negative   Trace                 Physical Exam  Vitals and nursing note reviewed.   Constitutional:       Appearance: She is well-developed.   HENT:      Head: Normocephalic and atraumatic.   Cardiovascular:      Rate and Rhythm: Normal rate.   Pulmonary:      Effort: Pulmonary effort is normal.   Abdominal:      General: There is no distension.      Palpations: Abdomen is soft. There is no mass.      Tenderness: There is no abdominal tenderness. There is no guarding.   Genitourinary:     Vagina: No vaginal discharge.                Comments: PROLAPSED FIBROID FILLS VAGINA.  Musculoskeletal:         General: No tenderness or deformity. Normal range of motion.      Cervical back: Normal range of motion.   Skin:     General: Skin is warm and dry.      Coloration: Skin is not pale.      Findings: No erythema or rash.   Neurological:      Mental Status: She is alert and oriented to person, place, and time.   Psychiatric:         Behavior: Behavior normal.    "      Thought Content: Thought content normal.         Judgment: Judgment normal.         I saw the patient with a face mask, gloves and eye protection  The patient herself was masked.  Social distancing was observed as appropriate. All COVID precautions observed.     IMPRESSION:      Diagnoses and all orders for this visit:    1. Fibroids (Primary)  -     POC Urinalysis Dipstick  -     Case Request; Standing  -     sodium chloride 0.9 % flush 1-10 mL  -     sodium chloride 0.9 % flush 3 mL  -     sodium chloride 0.9 % infusion 40 mL  -     Case Request    2. Previous  section x3, PPS  -     Case Request; Standing  -     sodium chloride 0.9 % flush 1-10 mL  -     sodium chloride 0.9 % flush 3 mL  -     sodium chloride 0.9 % infusion 40 mL  -     Case Request    3. prolapsed fibroid of cervix  -     Case Request; Standing  -     sodium chloride 0.9 % flush 1-10 mL  -     sodium chloride 0.9 % flush 3 mL  -     sodium chloride 0.9 % infusion 40 mL  -     Case Request    4. Menorrhagia with irregular cycle  -     Case Request; Standing  -     sodium chloride 0.9 % flush 1-10 mL  -     sodium chloride 0.9 % flush 3 mL  -     sodium chloride 0.9 % infusion 40 mL  -     Case Request    Other orders  -     Code Status and Medical Interventions:; Standing  -     Follow Anesthesia Guidelines / Protocol; Future  -     Chlorhexidine Skin Prep; Future  -     Follow Anesthesia Guidelines / Protocol; Standing  -     Obtain informed consent; Standing  -     Place sequential compression device; Standing  -     Verify / Perform Chlorhexidine Skin Prep; Standing  -     Insert Peripheral IV; Standing  -     Saline Lock & Maintain IV Access; Standing          PLAN:      CERVICAL POLYPECTOMY    RISKS, ALTERNATIVES, COMPLICATIONS OF THE PROCEDURE INCLUDING BUT NOT LIMITED TO:    INTRAOPERATIVE RISKS: INJURY TO INTERNAL AND ADJACENT ORGANS AND STRUCTURES (BOWEL, BLADDER, URETER,BLOOD VESSELS) OR HEMORRHAGE REQUIRING FURTHER SURGERY  (LAPAROTOMY),  POSSIBLE NON-DIAGNOSTIC FINDINGS, DISCOVERY OF POSSIBLE MALIGNANCY, INFECTION, AND DEATH;   POSTOP COMPLICATIONS: BLEEDING, INFECTION (REQUIRING POSSIBLE REOPERATION), FAILURE OF GOAL OF SURGERY AND RECURRENCE OF ORIGINAL SYMPTOMS, PNEUMONIA, PULMONARY EMBOLISM, AND DEATH;  WERE EXPLAINED TO THE PT WHO VERBALIZED HER UNDERSTANDING.          Pt instructed to call for results of any testing done today if she does not hear from us, and that failure to do so could result in inadequate treatment . Pt verbalized her understanding.     No follow-ups on file..  Instructions and precautions given.     Time Spent: I spent 30 minutes caring for Theresa on this date of service. This time includes time spent by me in the following activities: preparing for the visit, reviewing tests, obtaining and/or reviewing a separately obtained history, performing a medically appropriate examination and/or evaluation, counseling and educating the patient/family/caregiver, ordering medications, tests, or procedures, referring and communicating with other health care professionals, documenting information in the medical record, independently interpreting results and communicating that information with the patient/family/caregiver and care coordination.      Wilfredo Delcid MD  14:28 EDT   03/28/2023

## 2023-03-28 ENCOUNTER — OFFICE VISIT (OUTPATIENT)
Dept: OBSTETRICS AND GYNECOLOGY | Facility: CLINIC | Age: 38
End: 2023-03-28
Payer: COMMERCIAL

## 2023-03-28 VITALS
SYSTOLIC BLOOD PRESSURE: 140 MMHG | HEIGHT: 61 IN | DIASTOLIC BLOOD PRESSURE: 88 MMHG | BODY MASS INDEX: 43.43 KG/M2 | WEIGHT: 230 LBS

## 2023-03-28 DIAGNOSIS — N92.1 MENORRHAGIA WITH IRREGULAR CYCLE: ICD-10-CM

## 2023-03-28 DIAGNOSIS — D21.9 FIBROIDS: Primary | ICD-10-CM

## 2023-03-28 DIAGNOSIS — D25.9 FIBROID OF CERVIX: ICD-10-CM

## 2023-03-28 DIAGNOSIS — Z98.891 PREVIOUS CESAREAN SECTION: ICD-10-CM

## 2023-03-28 LAB
BILIRUB BLD-MCNC: NEGATIVE MG/DL
CLARITY, POC: CLEAR
COLOR UR: YELLOW
GLUCOSE UR STRIP-MCNC: NEGATIVE MG/DL
KETONES UR QL: NEGATIVE
LEUKOCYTE EST, POC: NEGATIVE
NITRITE UR-MCNC: NEGATIVE MG/ML
PH UR: 8 [PH] (ref 5–8)
PROT UR STRIP-MCNC: ABNORMAL MG/DL
RBC # UR STRIP: ABNORMAL /UL
SP GR UR: 1 (ref 1–1.03)
UROBILINOGEN UR QL: NORMAL

## 2023-03-28 RX ORDER — PHENTERMINE HYDROCHLORIDE 37.5 MG/1
TABLET ORAL
COMMUNITY
Start: 2023-03-13

## 2023-03-28 RX ORDER — SODIUM CHLORIDE 0.9 % (FLUSH) 0.9 %
3 SYRINGE (ML) INJECTION EVERY 12 HOURS SCHEDULED
OUTPATIENT
Start: 2023-03-28

## 2023-03-28 RX ORDER — SODIUM CHLORIDE 0.9 % (FLUSH) 0.9 %
1-10 SYRINGE (ML) INJECTION AS NEEDED
OUTPATIENT
Start: 2023-03-28

## 2023-03-28 RX ORDER — SODIUM CHLORIDE 9 MG/ML
40 INJECTION, SOLUTION INTRAVENOUS AS NEEDED
OUTPATIENT
Start: 2023-03-28

## 2023-04-03 ENCOUNTER — TELEPHONE (OUTPATIENT)
Dept: OBSTETRICS AND GYNECOLOGY | Facility: CLINIC | Age: 38
End: 2023-04-03
Payer: COMMERCIAL

## 2023-04-03 NOTE — TELEPHONE ENCOUNTER
Patient called in and states you offered her a TLH, she has decided she wants to do that now instead of the cervical polypectomy. Can you please change surgery orders.

## 2023-04-12 ENCOUNTER — PRE-ADMISSION TESTING (OUTPATIENT)
Dept: PREADMISSION TESTING | Facility: HOSPITAL | Age: 38
End: 2023-04-12
Payer: COMMERCIAL

## 2023-04-12 VITALS
SYSTOLIC BLOOD PRESSURE: 141 MMHG | BODY MASS INDEX: 39.27 KG/M2 | HEIGHT: 62 IN | DIASTOLIC BLOOD PRESSURE: 82 MMHG | HEART RATE: 62 BPM | WEIGHT: 213.41 LBS | OXYGEN SATURATION: 96 % | RESPIRATION RATE: 16 BRPM

## 2023-04-12 DIAGNOSIS — Z01.818 PRE-OP EXAMINATION: ICD-10-CM

## 2023-04-12 LAB
DEPRECATED RDW RBC AUTO: 42.1 FL (ref 37–54)
ERYTHROCYTE [DISTWIDTH] IN BLOOD BY AUTOMATED COUNT: 13.6 % (ref 12.3–15.4)
HBA1C MFR BLD: 5.4 % (ref 4.8–5.6)
HCT VFR BLD AUTO: 38.9 % (ref 34–46.6)
HGB BLD-MCNC: 12.4 G/DL (ref 12–15.9)
MCH RBC QN AUTO: 26.9 PG (ref 26.6–33)
MCHC RBC AUTO-ENTMCNC: 31.9 G/DL (ref 31.5–35.7)
MCV RBC AUTO: 84.4 FL (ref 79–97)
PLATELET # BLD AUTO: 301 10*3/MM3 (ref 140–450)
PMV BLD AUTO: 10.4 FL (ref 6–12)
RBC # BLD AUTO: 4.61 10*6/MM3 (ref 3.77–5.28)
WBC NRBC COR # BLD: 7.15 10*3/MM3 (ref 3.4–10.8)

## 2023-04-12 PROCEDURE — 85027 COMPLETE CBC AUTOMATED: CPT | Performed by: OBSTETRICS & GYNECOLOGY

## 2023-04-12 PROCEDURE — 83036 HEMOGLOBIN GLYCOSYLATED A1C: CPT | Performed by: OBSTETRICS & GYNECOLOGY

## 2023-04-12 PROCEDURE — 36415 COLL VENOUS BLD VENIPUNCTURE: CPT

## 2023-04-12 RX ORDER — SEMAGLUTIDE 1.34 MG/ML
INJECTION, SOLUTION SUBCUTANEOUS WEEKLY
COMMUNITY

## 2023-04-12 NOTE — DISCHARGE INSTRUCTIONS
PRE-ADMISSION TESTING INSTRUCTIONS FOR ADULTS    Take these medications the morning of surgery with a small sip of water: nothing  STOP Phenteramine 2 weeks prior to surgery - last dose 4/20    Come Back to hospital the week of surgery go to lab for your Type and Screen!    Do not take any insulin or diabetes medications the morning of surgery.      No aspirin, advil, aleve, ibuprofen, naproxen, diet pills, decongestants, or herbal/vitamins for a week prior to surgery.       Tylenol/Acetaminophen is okay to take if needed.    General Instructions:    DO NOT EAT SOLID FOOD AFTER MIDNIGHT THE NIGHT BEFORE SURGERY. No gum, mints, or hard candy after midnight the night before surgery.  You may drink clear liquids the day of surgery up until 2 hours before your arrival time.  Clear liquids are liquids you can see through. Nothing RED in color.    Plain water    Sports drinks      Gelatin (Jell-O)  Fruit juices without pulp such as white grape juice and apple juice  Popsicles that contain no fruit or yogurt  Tea or coffee (no cream or milk added)    It is beneficial for you to have a clear drink that contains carbohydrates 2 hours before your arrival time.  We suggest a 20 ounce bottle of Gatorade or Powerade for non-diabetic patients or a 20 ounce bottle of Gatorade Zero or Powerade Zero for diabetic patients.     Patients who avoid smoking, chewing tobacco and alcohol for 4 weeks prior to surgery have a reduced risk of post-operative complications.  If at all possible, quit smoking as many days before surgery as you can.    Do not smoke, use chewing tobacco or drink alcohol the day of surgery    Bring your C-PAP/ BI-PAP machine if you use one.  Wear clean comfortable clothes.  Do not wear contact lenses, lotion, deodorant, or make-up.  Bring a case for your glasses if applicable. You may brush your teeth the morning of surgery.  You may wear dentures/partials, do not put adhesive/glue on them.  Leave all other jewelry  and valuables at home.      Preventing a Surgical Site Infection:    Shower the night before and on the morning of surgery using the chlorhexidine soap you were given.  Use a clean washcloth with the soap.  Place clean sheets on your bed after showering the night before surgery. Do not use the CHG soap on your hair, face, or private areas. Wash your body gently for five (5) minutes. Do not scrub your skin.  Dry with a clean towel and dress in clean clothing.  Do not shave the surgical area for 10 days-2 weeks prior to surgery  because the razor can irritate skin and make it easier to develop an infection.  Make sure you, your family, and all healthcare providers clean their hands with soap and water or an alcohol based hand  before caring for you or your wound.      Day of surgery:    Your surgeon’s office will advise you of your arrival time for the day of surgery.    Upon arrival, a Pre-op nurse and Anesthesia provider will review your health history, obtain vital signs, and answer questions you may have. The anesthesia provider will also discuss the type of anesthesia that will be needed for your procedure, which may include general anesthesia. The only belongings needed at this time will be your home medications and if applicable your C-PAP/BI-PAP machine.  If you are staying overnight your family can leave the rest of your belongings in the car and bring them to your room later.  A Pre-op nurse will start an IV and you may receive medication in preparation for surgery, including something to help you relax.  Your family will be able to see you in the Pre-op area.  While you are in surgery your family should notify the waiting room  if they leave the waiting room area and provide a contact phone number.    IF you have any questions, you can call the Pre-Admission Department at [phone] or your surgeon's office.  Notify your surgeon if  you become sick, have a fever, productive cough,  or cannot be here the day of surgery    Please be aware that surgery does come with discomfort.  We want to make every effort to control your discomfort so please discuss any uncontrolled symptoms with your nurse.   Your doctor will most likely have prescribed pain medications.      If you are going home after surgery, you will receive individualized written care instructions before being discharged.  A responsible adult (over the age of 18) must drive you to and from the hospital on the day of your surgery and stay with you for 24 hours after anesthesia.    If you are staying overnight following surgery, you will be transported to your hospital room following the recovery period.  [facility] has all private rooms.    You may receive a survey regarding the care you received. Your feedback is very important and will be used to collect the necessary data to help us to continue to provide excellent care.     Deductibles and co-payments are collected on the day of service. Please be prepared to pay the required co-pay, deductible or deposit on the day of service as defined by your plan.

## 2023-04-12 NOTE — PAT
Pt here for PAT visit.  Pre-op tests completed, chg soap given, and instructions reviewed.  Instructed clears until 2 hrs prior to arrival time, voiced understanding. Pt instructed to stop Phenteramine 2 weeks prior to surgery (last dose 4/20). Pt here too soon her Type and Screen, Instructed pt to come back to hospital the week of surgery to have her T&S drawn, ERAS handouts given and reviewed. Pt indicated understanding

## 2023-04-26 ENCOUNTER — LAB (OUTPATIENT)
Dept: LAB | Facility: HOSPITAL | Age: 38
End: 2023-04-26
Payer: COMMERCIAL

## 2023-04-26 LAB
ABO GROUP BLD: NORMAL
BLD GP AB SCN SERPL QL: NEGATIVE
RH BLD: POSITIVE
T&S EXPIRATION DATE: NORMAL

## 2023-04-26 PROCEDURE — 36415 COLL VENOUS BLD VENIPUNCTURE: CPT

## 2023-04-26 PROCEDURE — 86900 BLOOD TYPING SEROLOGIC ABO: CPT | Performed by: OBSTETRICS & GYNECOLOGY

## 2023-04-26 PROCEDURE — 86850 RBC ANTIBODY SCREEN: CPT | Performed by: OBSTETRICS & GYNECOLOGY

## 2023-04-26 PROCEDURE — 86901 BLOOD TYPING SEROLOGIC RH(D): CPT | Performed by: OBSTETRICS & GYNECOLOGY

## 2023-05-03 ENCOUNTER — ANESTHESIA EVENT (OUTPATIENT)
Dept: PERIOP | Facility: HOSPITAL | Age: 38
End: 2023-05-03
Payer: COMMERCIAL

## 2023-05-03 PROCEDURE — S0260 H&P FOR SURGERY: HCPCS | Performed by: OBSTETRICS & GYNECOLOGY

## 2023-05-04 ENCOUNTER — ANESTHESIA (OUTPATIENT)
Dept: PERIOP | Facility: HOSPITAL | Age: 38
End: 2023-05-04
Payer: COMMERCIAL

## 2023-05-04 ENCOUNTER — HOSPITAL ENCOUNTER (OUTPATIENT)
Facility: HOSPITAL | Age: 38
Setting detail: OBSERVATION
Discharge: HOME | End: 2023-05-05
Attending: OBSTETRICS & GYNECOLOGY | Admitting: OBSTETRICS & GYNECOLOGY
Payer: COMMERCIAL

## 2023-05-04 DIAGNOSIS — Z98.891 PREVIOUS CESAREAN SECTION: ICD-10-CM

## 2023-05-04 DIAGNOSIS — D21.9 FIBROIDS: ICD-10-CM

## 2023-05-04 DIAGNOSIS — D25.9 FIBROID OF CERVIX: ICD-10-CM

## 2023-05-04 DIAGNOSIS — N92.1 MENORRHAGIA WITH IRREGULAR CYCLE: ICD-10-CM

## 2023-05-04 DIAGNOSIS — Z90.710 S/P LAPAROSCOPIC HYSTERECTOMY: Primary | ICD-10-CM

## 2023-05-04 PROBLEM — N92.0 MENORRHAGIA WITH REGULAR CYCLE: Status: RESOLVED | Noted: 2023-05-04 | Resolved: 2023-05-04

## 2023-05-04 PROBLEM — N92.0 MENORRHAGIA WITH REGULAR CYCLE: Status: ACTIVE | Noted: 2023-05-04

## 2023-05-04 LAB — HCG SERPL QL: NEGATIVE

## 2023-05-04 PROCEDURE — 58571 TLH W/T/O 250 G OR LESS: CPT | Performed by: OBSTETRICS & GYNECOLOGY

## 2023-05-04 PROCEDURE — 25010000002 ONDANSETRON PER 1 MG: Performed by: OBSTETRICS & GYNECOLOGY

## 2023-05-04 PROCEDURE — 25010000002 ONDANSETRON PER 1 MG: Performed by: NURSE ANESTHETIST, CERTIFIED REGISTERED

## 2023-05-04 PROCEDURE — 84703 CHORIONIC GONADOTROPIN ASSAY: CPT | Performed by: NURSE ANESTHETIST, CERTIFIED REGISTERED

## 2023-05-04 PROCEDURE — G0378 HOSPITAL OBSERVATION PER HR: HCPCS

## 2023-05-04 PROCEDURE — 88302 TISSUE EXAM BY PATHOLOGIST: CPT | Performed by: OBSTETRICS & GYNECOLOGY

## 2023-05-04 PROCEDURE — 25010000002 DEXAMETHASONE PER 1 MG: Performed by: NURSE ANESTHETIST, CERTIFIED REGISTERED

## 2023-05-04 PROCEDURE — 25010000002 PROPOFOL 200 MG/20ML EMULSION: Performed by: NURSE ANESTHETIST, CERTIFIED REGISTERED

## 2023-05-04 PROCEDURE — 88305 TISSUE EXAM BY PATHOLOGIST: CPT | Performed by: OBSTETRICS & GYNECOLOGY

## 2023-05-04 PROCEDURE — 25010000002 MIDAZOLAM PER 1MG: Performed by: NURSE ANESTHETIST, CERTIFIED REGISTERED

## 2023-05-04 PROCEDURE — 88307 TISSUE EXAM BY PATHOLOGIST: CPT | Performed by: OBSTETRICS & GYNECOLOGY

## 2023-05-04 PROCEDURE — 25010000002 SUGAMMADEX 200 MG/2ML SOLUTION: Performed by: NURSE ANESTHETIST, CERTIFIED REGISTERED

## 2023-05-04 PROCEDURE — 0 CEFAZOLIN SODIUM-DEXTROSE 2-3 GM-%(50ML) RECONSTITUTED SOLUTION: Performed by: OBSTETRICS & GYNECOLOGY

## 2023-05-04 PROCEDURE — 0 MORPHINE PER 10 MG: Performed by: NURSE ANESTHETIST, CERTIFIED REGISTERED

## 2023-05-04 PROCEDURE — 25010000002 KETOROLAC TROMETHAMINE PER 15 MG: Performed by: OBSTETRICS & GYNECOLOGY

## 2023-05-04 DEVICE — ABSORBABLE RELOAD
Type: IMPLANTABLE DEVICE | Site: ABDOMEN | Status: FUNCTIONAL
Brand: V-LOC 180

## 2023-05-04 RX ORDER — ROCURONIUM BROMIDE 10 MG/ML
INJECTION, SOLUTION INTRAVENOUS AS NEEDED
Status: DISCONTINUED | OUTPATIENT
Start: 2023-05-04 | End: 2023-05-04 | Stop reason: SURG

## 2023-05-04 RX ORDER — DEXAMETHASONE SODIUM PHOSPHATE 4 MG/ML
4 INJECTION, SOLUTION INTRA-ARTICULAR; INTRALESIONAL; INTRAMUSCULAR; INTRAVENOUS; SOFT TISSUE ONCE AS NEEDED
Status: COMPLETED | OUTPATIENT
Start: 2023-05-04 | End: 2023-05-04

## 2023-05-04 RX ORDER — SODIUM CHLORIDE, SODIUM LACTATE, POTASSIUM CHLORIDE, CALCIUM CHLORIDE 600; 310; 30; 20 MG/100ML; MG/100ML; MG/100ML; MG/100ML
150 INJECTION, SOLUTION INTRAVENOUS CONTINUOUS
Status: DISCONTINUED | OUTPATIENT
Start: 2023-05-04 | End: 2023-05-05 | Stop reason: HOSPADM

## 2023-05-04 RX ORDER — ONDANSETRON 2 MG/ML
4 INJECTION INTRAMUSCULAR; INTRAVENOUS ONCE AS NEEDED
Status: COMPLETED | OUTPATIENT
Start: 2023-05-04 | End: 2023-05-04

## 2023-05-04 RX ORDER — SODIUM CHLORIDE 0.9 % (FLUSH) 0.9 %
10 SYRINGE (ML) INJECTION EVERY 12 HOURS SCHEDULED
Status: DISCONTINUED | OUTPATIENT
Start: 2023-05-04 | End: 2023-05-04 | Stop reason: HOSPADM

## 2023-05-04 RX ORDER — SODIUM CHLORIDE, SODIUM LACTATE, POTASSIUM CHLORIDE, CALCIUM CHLORIDE 600; 310; 30; 20 MG/100ML; MG/100ML; MG/100ML; MG/100ML
9 INJECTION, SOLUTION INTRAVENOUS CONTINUOUS PRN
Status: DISCONTINUED | OUTPATIENT
Start: 2023-05-04 | End: 2023-05-04 | Stop reason: HOSPADM

## 2023-05-04 RX ORDER — MIDAZOLAM HYDROCHLORIDE 2 MG/2ML
1 INJECTION, SOLUTION INTRAMUSCULAR; INTRAVENOUS
Status: COMPLETED | OUTPATIENT
Start: 2023-05-04 | End: 2023-05-04

## 2023-05-04 RX ORDER — ONDANSETRON 2 MG/ML
4 INJECTION INTRAMUSCULAR; INTRAVENOUS ONCE AS NEEDED
Status: DISCONTINUED | OUTPATIENT
Start: 2023-05-04 | End: 2023-05-04 | Stop reason: HOSPADM

## 2023-05-04 RX ORDER — SODIUM CHLORIDE 9 MG/ML
40 INJECTION, SOLUTION INTRAVENOUS AS NEEDED
Status: DISCONTINUED | OUTPATIENT
Start: 2023-05-04 | End: 2023-05-04 | Stop reason: HOSPADM

## 2023-05-04 RX ORDER — SODIUM CHLORIDE 0.9 % (FLUSH) 0.9 %
10 SYRINGE (ML) INJECTION AS NEEDED
Status: DISCONTINUED | OUTPATIENT
Start: 2023-05-04 | End: 2023-05-04 | Stop reason: HOSPADM

## 2023-05-04 RX ORDER — KETOROLAC TROMETHAMINE 30 MG/ML
15 INJECTION, SOLUTION INTRAMUSCULAR; INTRAVENOUS EVERY 6 HOURS
Status: COMPLETED | OUTPATIENT
Start: 2023-05-04 | End: 2023-05-04

## 2023-05-04 RX ORDER — BUPIVACAINE HYDROCHLORIDE 5 MG/ML
INJECTION, SOLUTION EPIDURAL; INTRACAUDAL AS NEEDED
Status: DISCONTINUED | OUTPATIENT
Start: 2023-05-04 | End: 2023-05-04 | Stop reason: SURG

## 2023-05-04 RX ORDER — IBUPROFEN 800 MG/1
800 TABLET ORAL EVERY 8 HOURS PRN
Status: DISCONTINUED | OUTPATIENT
Start: 2023-05-04 | End: 2023-05-05 | Stop reason: HOSPADM

## 2023-05-04 RX ORDER — CEFAZOLIN SODIUM 2 G/50ML
2 SOLUTION INTRAVENOUS ONCE
Status: COMPLETED | OUTPATIENT
Start: 2023-05-04 | End: 2023-05-04

## 2023-05-04 RX ORDER — ONDANSETRON 2 MG/ML
4 INJECTION INTRAMUSCULAR; INTRAVENOUS EVERY 6 HOURS PRN
Status: DISCONTINUED | OUTPATIENT
Start: 2023-05-04 | End: 2023-05-05 | Stop reason: HOSPADM

## 2023-05-04 RX ORDER — OXYCODONE HYDROCHLORIDE AND ACETAMINOPHEN 5; 325 MG/1; MG/1
2 TABLET ORAL EVERY 4 HOURS PRN
Status: DISCONTINUED | OUTPATIENT
Start: 2023-05-04 | End: 2023-05-05 | Stop reason: HOSPADM

## 2023-05-04 RX ORDER — POLYETHYLENE GLYCOL 3350 17 G/17G
17 POWDER, FOR SOLUTION ORAL DAILY PRN
Status: DISCONTINUED | OUTPATIENT
Start: 2023-05-04 | End: 2023-05-05 | Stop reason: HOSPADM

## 2023-05-04 RX ORDER — ESMOLOL HYDROCHLORIDE 10 MG/ML
INJECTION INTRAVENOUS AS NEEDED
Status: DISCONTINUED | OUTPATIENT
Start: 2023-05-04 | End: 2023-05-04 | Stop reason: SURG

## 2023-05-04 RX ORDER — OXYCODONE HYDROCHLORIDE AND ACETAMINOPHEN 5; 325 MG/1; MG/1
1 TABLET ORAL ONCE AS NEEDED
Status: DISCONTINUED | OUTPATIENT
Start: 2023-05-04 | End: 2023-05-04 | Stop reason: HOSPADM

## 2023-05-04 RX ORDER — DOCUSATE SODIUM 100 MG/1
100 CAPSULE, LIQUID FILLED ORAL 2 TIMES DAILY
Status: DISCONTINUED | OUTPATIENT
Start: 2023-05-04 | End: 2023-05-05 | Stop reason: HOSPADM

## 2023-05-04 RX ORDER — LIDOCAINE HYDROCHLORIDE 20 MG/ML
INJECTION, SOLUTION INFILTRATION; PERINEURAL AS NEEDED
Status: DISCONTINUED | OUTPATIENT
Start: 2023-05-04 | End: 2023-05-04 | Stop reason: SURG

## 2023-05-04 RX ORDER — BUPIVACAINE HYDROCHLORIDE 2.5 MG/ML
INJECTION, SOLUTION INFILTRATION; PERINEURAL AS NEEDED
Status: DISCONTINUED | OUTPATIENT
Start: 2023-05-04 | End: 2023-05-04 | Stop reason: HOSPADM

## 2023-05-04 RX ORDER — MORPHINE SULFATE 0.5 MG/ML
INJECTION, SOLUTION EPIDURAL; INTRATHECAL; INTRAVENOUS AS NEEDED
Status: DISCONTINUED | OUTPATIENT
Start: 2023-05-04 | End: 2023-05-04 | Stop reason: SURG

## 2023-05-04 RX ORDER — SODIUM CHLORIDE 0.9 % (FLUSH) 0.9 %
1-10 SYRINGE (ML) INJECTION AS NEEDED
Status: DISCONTINUED | OUTPATIENT
Start: 2023-05-04 | End: 2023-05-04 | Stop reason: HOSPADM

## 2023-05-04 RX ORDER — KETAMINE HYDROCHLORIDE 10 MG/ML
INJECTION INTRAMUSCULAR; INTRAVENOUS AS NEEDED
Status: DISCONTINUED | OUTPATIENT
Start: 2023-05-04 | End: 2023-05-04 | Stop reason: SURG

## 2023-05-04 RX ORDER — FAMOTIDINE 10 MG/ML
20 INJECTION, SOLUTION INTRAVENOUS
Status: COMPLETED | OUTPATIENT
Start: 2023-05-04 | End: 2023-05-04

## 2023-05-04 RX ORDER — LIDOCAINE HYDROCHLORIDE 10 MG/ML
0.5 INJECTION, SOLUTION EPIDURAL; INFILTRATION; INTRACAUDAL; PERINEURAL ONCE AS NEEDED
Status: DISCONTINUED | OUTPATIENT
Start: 2023-05-04 | End: 2023-05-04 | Stop reason: HOSPADM

## 2023-05-04 RX ORDER — FENTANYL CITRATE 50 UG/ML
50 INJECTION, SOLUTION INTRAMUSCULAR; INTRAVENOUS
Status: DISCONTINUED | OUTPATIENT
Start: 2023-05-04 | End: 2023-05-04 | Stop reason: HOSPADM

## 2023-05-04 RX ORDER — PROPOFOL 10 MG/ML
INJECTION, EMULSION INTRAVENOUS AS NEEDED
Status: DISCONTINUED | OUTPATIENT
Start: 2023-05-04 | End: 2023-05-04 | Stop reason: SURG

## 2023-05-04 RX ORDER — SODIUM CHLORIDE 0.9 % (FLUSH) 0.9 %
3 SYRINGE (ML) INJECTION EVERY 12 HOURS SCHEDULED
Status: DISCONTINUED | OUTPATIENT
Start: 2023-05-04 | End: 2023-05-04 | Stop reason: HOSPADM

## 2023-05-04 RX ORDER — ONDANSETRON 4 MG/1
4 TABLET, FILM COATED ORAL EVERY 6 HOURS PRN
Status: DISCONTINUED | OUTPATIENT
Start: 2023-05-04 | End: 2023-05-05 | Stop reason: HOSPADM

## 2023-05-04 RX ADMIN — KETOROLAC TROMETHAMINE 15 MG: 30 INJECTION, SOLUTION INTRAMUSCULAR; INTRAVENOUS at 22:12

## 2023-05-04 RX ADMIN — MIDAZOLAM HYDROCHLORIDE 1 MG: 1 INJECTION, SOLUTION INTRAMUSCULAR; INTRAVENOUS at 09:36

## 2023-05-04 RX ADMIN — SODIUM CHLORIDE, POTASSIUM CHLORIDE, SODIUM LACTATE AND CALCIUM CHLORIDE 150 ML/HR: 600; 310; 30; 20 INJECTION, SOLUTION INTRAVENOUS at 17:21

## 2023-05-04 RX ADMIN — SODIUM CHLORIDE, POTASSIUM CHLORIDE, SODIUM LACTATE AND CALCIUM CHLORIDE 9 ML/HR: 600; 310; 30; 20 INJECTION, SOLUTION INTRAVENOUS at 08:51

## 2023-05-04 RX ADMIN — KETAMINE HYDROCHLORIDE 20 MG: 10 INJECTION INTRAMUSCULAR; INTRAVENOUS at 10:20

## 2023-05-04 RX ADMIN — KETAMINE HYDROCHLORIDE 10 MG: 10 INJECTION INTRAMUSCULAR; INTRAVENOUS at 10:38

## 2023-05-04 RX ADMIN — ROCURONIUM BROMIDE 10 MG: 10 INJECTION INTRAVENOUS at 10:35

## 2023-05-04 RX ADMIN — PROPOFOL INJECTABLE EMULSION 100 MG: 10 INJECTION, EMULSION INTRAVENOUS at 10:22

## 2023-05-04 RX ADMIN — FAMOTIDINE 20 MG: 10 INJECTION, SOLUTION INTRAVENOUS at 09:00

## 2023-05-04 RX ADMIN — MIDAZOLAM HYDROCHLORIDE 1 MG: 1 INJECTION, SOLUTION INTRAMUSCULAR; INTRAVENOUS at 09:34

## 2023-05-04 RX ADMIN — ROCURONIUM BROMIDE 40 MG: 10 INJECTION INTRAVENOUS at 10:20

## 2023-05-04 RX ADMIN — BUPIVACAINE HYDROCHLORIDE 1.6 ML: 5 INJECTION, SOLUTION EPIDURAL; INTRACAUDAL; PERINEURAL at 09:39

## 2023-05-04 RX ADMIN — KETOROLAC TROMETHAMINE 15 MG: 30 INJECTION, SOLUTION INTRAMUSCULAR; INTRAVENOUS at 15:44

## 2023-05-04 RX ADMIN — LIDOCAINE HYDROCHLORIDE 100 MG: 20 INJECTION, SOLUTION INFILTRATION; PERINEURAL at 10:20

## 2023-05-04 RX ADMIN — SUGAMMADEX 200 MG: 100 INJECTION, SOLUTION INTRAVENOUS at 12:13

## 2023-05-04 RX ADMIN — CEFAZOLIN SODIUM 2 G: 2 SOLUTION INTRAVENOUS at 10:25

## 2023-05-04 RX ADMIN — ONDANSETRON 4 MG: 2 INJECTION INTRAMUSCULAR; INTRAVENOUS at 13:41

## 2023-05-04 RX ADMIN — ROCURONIUM BROMIDE 20 MG: 10 INJECTION INTRAVENOUS at 11:21

## 2023-05-04 RX ADMIN — ONDANSETRON 4 MG: 2 INJECTION INTRAMUSCULAR; INTRAVENOUS at 09:00

## 2023-05-04 RX ADMIN — DOCUSATE SODIUM 100 MG: 100 CAPSULE, LIQUID FILLED ORAL at 20:14

## 2023-05-04 RX ADMIN — ESMOLOL HYDROCHLORIDE 30 MG: 100 INJECTION, SOLUTION INTRAVENOUS at 10:21

## 2023-05-04 RX ADMIN — DEXAMETHASONE SODIUM PHOSPHATE 4 MG: 4 INJECTION, SOLUTION INTRAMUSCULAR; INTRAVENOUS at 09:00

## 2023-05-04 RX ADMIN — MORPHINE SULFATE 0.2 MG: 0.5 INJECTION, SOLUTION EPIDURAL; INTRATHECAL; INTRAVENOUS at 09:39

## 2023-05-04 RX ADMIN — ROCURONIUM BROMIDE 20 MG: 10 INJECTION INTRAVENOUS at 10:59

## 2023-05-04 RX ADMIN — PROPOFOL INJECTABLE EMULSION 200 MG: 10 INJECTION, EMULSION INTRAVENOUS at 10:20

## 2023-05-04 RX ADMIN — ESMOLOL HYDROCHLORIDE 50 MG: 100 INJECTION, SOLUTION INTRAVENOUS at 10:19

## 2023-05-04 RX ADMIN — KETAMINE HYDROCHLORIDE 10 MG: 10 INJECTION INTRAMUSCULAR; INTRAVENOUS at 11:37

## 2023-05-04 NOTE — ANESTHESIA PREPROCEDURE EVALUATION
Anesthesia Evaluation     Patient summary reviewed and Nursing notes reviewed   NPO Solid Status: > 8 hours  NPO Liquid Status: > 2 hours           Airway   Mallampati: III  TM distance: >3 FB  Neck ROM: full  Possible difficult intubation  Dental - normal exam     Pulmonary - negative pulmonary ROS and normal exam   Cardiovascular - negative cardio ROS  Exercise tolerance: good (4-7 METS)    Rhythm: regular  Rate: normal    (-) hypertension      Neuro/Psych  (+) psychiatric history ( no meds) Depression,    GI/Hepatic/Renal/Endo    (+)  GERD ( only with pregnancy) well controlled,      Musculoskeletal (-) negative ROS    Abdominal  - normal exam   Substance History      OB/GYN    (-) Preeclampsia and history of pregnancy induced hypertension        Other - negative ROS       (-) arthritis  ROS/Med Hx Other: 10 oz gatorade at 0600                  Anesthesia Plan    ASA 2     general and ITN       Anesthetic plan, risks, benefits, and alternatives have been provided, discussed and informed consent has been obtained with: patient.    Use of blood products discussed with patient  Consented to blood products.   Plan discussed with CRNA.        CODE STATUS:

## 2023-05-04 NOTE — PLAN OF CARE
Problem: Adult Inpatient Plan of Care  Goal: Plan of Care Review  Outcome: Ongoing, Progressing  Goal: Patient-Specific Goal (Individualized)  Outcome: Ongoing, Progressing  Goal: Absence of Hospital-Acquired Illness or Injury  Outcome: Ongoing, Progressing  Intervention: Identify and Manage Fall Risk  Recent Flowsheet Documentation  Taken 5/4/2023 1600 by Shilpa Doss RN  Safety Promotion/Fall Prevention: safety round/check completed  Taken 5/4/2023 1400 by Shilpa Doss RN  Safety Promotion/Fall Prevention:   safety round/check completed   room organization consistent   nonskid shoes/slippers when out of bed   clutter free environment maintained   assistive device/personal items within reach   activity supervised  Intervention: Prevent Skin Injury  Recent Flowsheet Documentation  Taken 5/4/2023 1400 by Shilpa Doss RN  Body Position: supine  Intervention: Prevent and Manage VTE (Venous Thromboembolism) Risk  Recent Flowsheet Documentation  Taken 5/4/2023 1400 by Shilpa Doss RN  VTE Prevention/Management:   bilateral   sequential compression devices on  Intervention: Prevent Infection  Recent Flowsheet Documentation  Taken 5/4/2023 1400 by Shilpa Doss RN  Infection Prevention:   cohorting utilized   environmental surveillance performed   equipment surfaces disinfected   hand hygiene promoted   personal protective equipment utilized   single patient room provided   rest/sleep promoted   visitors restricted/screened  Goal: Optimal Comfort and Wellbeing  Outcome: Ongoing, Progressing  Intervention: Monitor Pain and Promote Comfort  Recent Flowsheet Documentation  Taken 5/4/2023 1400 by Shilpa Doss RN  Pain Management Interventions: pain management plan reviewed with patient/caregiver  Intervention: Provide Person-Centered Care  Recent Flowsheet Documentation  Taken 5/4/2023 1400 by Shilpa Doss RN  Trust Relationship/Rapport:   care explained   emotional support provided    choices provided   empathic listening provided   reassurance provided   questions encouraged   questions answered   thoughts/feelings acknowledged  Goal: Readiness for Transition of Care  Outcome: Ongoing, Progressing     Problem: Bleeding (Hysterectomy)  Goal: Absence of Bleeding  Outcome: Ongoing, Progressing     Problem: Bowel Motility Impaired (Hysterectomy)  Goal: Effective Bowel Elimination  Outcome: Ongoing, Progressing     Problem: Fluid and Electrolyte Imbalance (Hysterectomy)  Goal: Fluid and Electrolyte Balance  Outcome: Ongoing, Progressing     Problem: Infection (Hysterectomy)  Goal: Absence of Infection Signs and Symptoms  Outcome: Ongoing, Progressing     Problem: Ongoing Anesthesia Effects (Hysterectomy)  Goal: Anesthesia/Sedation Recovery  Outcome: Ongoing, Progressing  Intervention: Optimize Anesthesia Recovery  Recent Flowsheet Documentation  Taken 5/4/2023 1600 by Shilpa Doss RN  Safety Promotion/Fall Prevention: safety round/check completed  Taken 5/4/2023 1400 by Shilpa Doss RN  Safety Promotion/Fall Prevention:   safety round/check completed   room organization consistent   nonskid shoes/slippers when out of bed   clutter free environment maintained   assistive device/personal items within reach   activity supervised     Problem: Pain (Hysterectomy)  Goal: Acceptable Pain Control  Outcome: Ongoing, Progressing  Intervention: Prevent or Manage Pain  Recent Flowsheet Documentation  Taken 5/4/2023 1400 by Shilpa Doss RN  Pain Management Interventions: pain management plan reviewed with patient/caregiver     Problem: Postoperative Nausea and Vomiting (Hysterectomy)  Goal: Nausea and Vomiting Relief  Outcome: Ongoing, Progressing  Intervention: Prevent or Manage Nausea and Vomiting  Recent Flowsheet Documentation  Taken 5/4/2023 1400 by Shilpa Doss RN  Nausea/Vomiting Interventions:   see MAR   stimuli minimized   nausea triggers minimized     Problem: Postoperative  Urinary Retention (Hysterectomy)  Goal: Effective Urinary Elimination  Outcome: Ongoing, Progressing     Problem: Respiratory Compromise (Hysterectomy)  Goal: Effective Oxygenation and Ventilation  Outcome: Ongoing, Progressing  Intervention: Optimize Oxygenation and Ventilation  Recent Flowsheet Documentation  Taken 5/4/2023 1400 by Shilpa Doss, RN  Head of Bed (HOB) Positioning: HOB at 20 degrees   Goal Outcome Evaluation:         VSS, incisions wdl, pt denies pain at this time - scheduled pain meds given, adequate output, tolerating regular PO diet, will get pt up x1 prior to shift change, should d/c home tomorrow.

## 2023-05-04 NOTE — H&P
PREOPERATIVE HISTORY AND PHYSICAL      Patient Care Team:  Paris Maldonado APRN as PCP - General (Family Medicine)    Chief complaint: Fibroid of cervix    Pt is a 38 y.o.   No LMP recorded.     HPI:History of Present Illness   Pt still has the prolapsed fibroid that she had during her pregnancy.  It did not shrink.  Pt has bleeding and pain with it and can feel it.  awkard during sex.  Pt would like removal. PT IS UP TO DATE ON HER PAP SMEARS. Pt desires hysterectomy due to pain and bleeding.      PMHx:   Past Medical History:   Diagnosis Date   • Anxiety    • Depression    • Gestational hypertension     with all pregnancies   • Preeclampsia 2022       Current problem list:    prolapsed fibroid of cervix    Fibroids    History of depression    Previous  section x3, PPS    Menorrhagia with irregular cycle       PSHx:   Past Surgical History:   Procedure Laterality Date   •  SECTION     •  SECTION N/A 2022    Procedure:  SECTION REPEAT;  Surgeon: Paris Fitzgerald MD;  Location: [facility] LABOR DELIVERY;  Service: Obstetrics/Gynecology;  Laterality: N/A;   •  SECTION WITH TUBAL Bilateral 2023    Procedure:  SECTION REPEAT WITH TUBAL,;  Surgeon: Wilfredo Delcid MD;  Location: [facility] OR;  Service: Obstetrics/Gynecology;  Laterality: Bilateral;   • CHOLECYSTECTOMY     • LAPAROSCOPIC CHOLECYSTECTOMY     • TUBAL ABDOMINAL LIGATION      and reversal   • TUBAL REANASTOMOSIS         Social Hx:   Social History     Socioeconomic History   • Marital status:    Tobacco Use   • Smoking status: Never   • Smokeless tobacco: Never   Vaping Use   • Vaping Use: Never used   Substance and Sexual Activity   • Alcohol use: Never   • Drug use: Never   • Sexual activity: Yes     Partners: Male     Birth control/protection: None       FHx:   Family History   Problem Relation Age of Onset   • Diabetes Father    • Hypertension Father    •  Heart disease Father    • Depression Mother    • Anxiety disorder Mother    • Breast cancer Neg Hx    • Ovarian cancer Neg Hx    • Uterine cancer Neg Hx    • Colon cancer Neg Hx    • Deep vein thrombosis Neg Hx    • Pulmonary embolism Neg Hx        Debilities/Disabilities Identified: None    Emotional Behavior: Appropriate    PGyn Hx:  otherwise noncontributory    POBHx:   OB History    Para Term  AB Living   6 5 5 0 1 4   SAB IAB Ectopic Molar Multiple Live Births   1 0 0 0 0 5      # Outcome Date GA Lbr Thai/2nd Weight Sex Delivery Anes PTL Lv   6 Term 23 37w0d  3345 g (7 lb 6 oz) M CS-LTranv Spinal N SUE      Name: LAKSHMI ROALESLIE      Apgar1: 8  Apgar5: 9   5 Term 22 37w0d  2750 g (6 lb 1 oz) F CS-LTranv Spinal N SUE      Name: ABHINAV JAQUEZLIDIA      Apgar1: 8  Apgar5: 9   4 SAB 2020 11w0d    SAB      3 Term 13 39w0d  3175 g (7 lb) M CS-LTranv EPI  SUE      Complications: Gestational hypertension   2 Term 02 40w0d  3062 g (6 lb 12 oz) M Vag-Spont EPI  SUE      Birth Comments: [facility]    1 Term 00 40w0d  2948 g (6 lb 8 oz) M Vag-Spont EPI  DEC      Birth Comments: [facility]       Obstetric Comments   - , term, 6.8#   2002- , term, 6.12#   2013- 1 LTCS, term 7.0 # - due to BP per pt   2022- RLTCS , 37 weeks, mild preeclampsia       Allergies: Patient has no known allergies.    Medications:   No medications prior to admission.                            No current facility-administered medications for this encounter.    Current Outpatient Medications:   •  phentermine (ADIPEX-P) 37.5 MG tablet, , Disp: , Rfl:   •  Semaglutide,0.25 or 0.5MG/DOS, (Ozempic, 0.25 or 0.5 MG/DOSE,) 2 MG/1.5ML solution pen-injector, Inject  under the skin into the appropriate area as directed 1 (One) Time Per Week., Disp: , Rfl:         Review of Systems   Constitutional: Negative.    HENT: Negative.    Eyes: Negative.    Respiratory: Negative.     Cardiovascular: Negative.    Gastrointestinal: Negative.    Endocrine: Negative.    Genitourinary: Positive for pelvic pain and vaginal bleeding.   Musculoskeletal: Negative.    Skin: Negative.    Allergic/Immunologic: Negative.    Neurological: Negative.    Hematological: Negative.    Psychiatric/Behavioral: Negative.        Vital Signs  There were no vitals taken for this visit.    Physical Exam  Vitals and nursing note reviewed.   Constitutional:       Appearance: She is well-developed.   HENT:      Head: Normocephalic and atraumatic.   Cardiovascular:      Rate and Rhythm: Normal rate.   Pulmonary:      Effort: Pulmonary effort is normal.   Abdominal:      General: There is no distension.      Palpations: Abdomen is soft. There is no mass.      Tenderness: There is no abdominal tenderness. There is no guarding.   Genitourinary:     Vagina: No vaginal discharge.   Musculoskeletal:         General: No tenderness or deformity. Normal range of motion.      Cervical back: Normal range of motion.   Skin:     General: Skin is warm and dry.      Coloration: Skin is not pale.      Findings: No erythema or rash.   Neurological:      Mental Status: She is alert and oriented to person, place, and time.   Psychiatric:         Behavior: Behavior normal.         Thought Content: Thought content normal.         Judgment: Judgment normal.             IMPRESSION:    Fibroid of cervix, abnormal bleeding.                                     PLAN:    Procedure(s):  TOTAL LAPAROSCOPIC HYSTERECTOMY, BILATERAL SALPINGECTOMY, POSSIBLE EXPLORATORY LAPAROTOMY, CERVICAL POLYPECTOMY    RISKS, ALTERNATIVES, COMPLICATIONS OF THE PROCEDURE INCLUDING BUT NOT LIMITED TO:    INTRAOPERATIVE RISKS: INJURY TO INTERNAL AND ADJACENT ORGANS AND STRUCTURES (BOWEL, BLADDER, URETER,BLOOD VESSELS) OR HEMORRHAGE REQUIRING FURTHER SURGERY (LAPAROTOMY),  POSSIBLE NON-DIAGNOSTIC FINDINGS, DISCOVERY OF POSSIBLE MALIGNANCY, INFECTION, AND DEATH;   POSTOP  COMPLICATIONS: BLEEDING, INFECTION (REQUIRING POSSIBLE REOPERATION), FAILURE OF GOAL OF SURGERY AND RECURRENCE OF ORIGINAL SYMPTOMS, PNEUMONIA, PULMONARY EMBOLISM, AND DEATH;  WERE EXPLAINED TO THE PT WHO VERBALIZED HER UNDERSTANDING.             I discussed the patients findings and my recommendations with patient.     Wilfredo Delcid MD  05/03/23  21:26 EDT

## 2023-05-04 NOTE — INTERVAL H&P NOTE
H&P reviewed. The patient was examined and there are no changes to the H&P.    /72 (BP Location: Left arm, Patient Position: Lying)   Pulse 64   Temp 98.3 °F (36.8 °C) (Oral)   Resp 16   Wt 97.6 kg (215 lb 3.2 oz)   LMP 04/20/2023 (Approximate)   SpO2 100%   BMI 39.36 kg/m²     Medications Prior to Admission   Medication Sig Dispense Refill Last Dose    phentermine (ADIPEX-P) 37.5 MG tablet    Past Week    Semaglutide,0.25 or 0.5MG/DOS, (Ozempic, 0.25 or 0.5 MG/DOSE,) 2 MG/1.5ML solution pen-injector Inject  under the skin into the appropriate area as directed 1 (One) Time Per Week.   5/1/2023     
Yes...

## 2023-05-04 NOTE — ANESTHESIA PROCEDURE NOTES
Intrathecal Block      Patient reassessed immediately prior to procedure    Patient location during procedure: pre-op  Start Time: 5/4/2023 9:37 AM  Stop Time: 5/4/2023 9:39 AM  Indication:at surgeon's request and post-op pain management  Performed By  CRNA/SKYLER: Michelle Cornejo CRNA  Intrathecal Block Prep:  Pt Position:sitting  Sterile Tech:cap, gloves, mask and sterile barrier  Prep:chloraprep  Monitoring:blood pressure monitoring, continuous pulse oximetry and EKG  Intrathecal Block Procedure:  Approach:midline  Guidance:landmark technique and palpation technique  Location:L3-L4  Needle Type:Sprotte  Needle Gauge:25 G  Placement of Needle Event: cerebrospinal fluid  Fluid Appearance:clear  Post Assessment:  Patient Tolerance:patient tolerated the procedure well with no apparent complications  Complications:no

## 2023-05-04 NOTE — ANESTHESIA PROCEDURE NOTES
Airway  Urgency: elective    Date/Time: 5/4/2023 10:23 AM  Airway not difficult    General Information and Staff    Patient location during procedure: OR  CRNA/CAA: Benoit Hebert CRNA  SRNA: Kaykay Michael SRNA  Indications and Patient Condition  Indications for airway management: airway protection    Preoxygenated: yes  Mask difficulty assessment: 1 - vent by mask    Final Airway Details  Final airway type: endotracheal airway      Successful airway: ETT  Cuffed: yes   Successful intubation technique: direct laryngoscopy  Facilitating devices/methods: intubating stylet  Endotracheal tube insertion site: oral  Blade: Kelsey  Blade size: 2  ETT size (mm): 7.0  Cormack-Lehane Classification: grade I - full view of glottis  Placement verified by: chest auscultation and capnometry   Measured from: lips  ETT/EBT  to lips (cm): 22  Number of attempts at approach: 1  Assessment: lips, teeth, and gum same as pre-op and atraumatic intubation

## 2023-05-04 NOTE — ANESTHESIA POSTPROCEDURE EVALUATION
Patient: Theresa ROA    Procedure Summary     Date: 23 Room / Location:  [facility]  /  [facility]    Anesthesia Start: 1015 Anesthesia Stop: 1230    Procedure: TOTAL LAPAROSCOPIC HYSTERECTOMY, BILATERAL SALPINGECTOMY, POSSIBLE EXPLORATORY LAPAROTOMY, CERVICAL POLYPECTOMY (Abdomen) Diagnosis:       Previous  section      Fibroids      Fibroid of cervix      Menorrhagia with irregular cycle      (Previous  section [Z98.891])      (Fibroids [D21.9])      (Fibroid of cervix [D25.9])      (Menorrhagia with irregular cycle [N92.1])    Surgeons: Wilfredo Delcid MD Provider: Benoit Hebert CRNA    Anesthesia Type: general, ITN ASA Status: 2          Anesthesia Type: general, ITN    Vitals  Vitals Value Taken Time   /113 23 1320   Temp 97.7 °F (36.5 °C) 23 1228   Pulse 73 23 1321   Resp 23 23 1320   SpO2 99 % 23 1321   Vitals shown include unvalidated device data.        Post Anesthesia Care and Evaluation    Patient location during evaluation: bedside  Patient participation: complete - patient participated  Level of consciousness: awake and alert  Pain score: 3  Pain management: adequate    Airway patency: patent  Anesthetic complications: No anesthetic complications  PONV Status: none  Cardiovascular status: acceptable  Respiratory status: acceptable  Hydration status: acceptable  No anesthesia care post op

## 2023-05-04 NOTE — OP NOTE
OPERATIVE NOTE    05/04/23    PROCEDURE: TOTAL LAPAROSCOPIC HYSTERECTOMY, BILATERAL TUBAL REMNANT REMOVAL, CYSTOSCOPY    PRE OP DIAGNOSIS:  Menorrhagia, previous C/S x 3, hx tubal ligation, menorrhagia, prolapsed cervical fibroid    POSTOP DIAGNOSIS:  same    SURGEON:  Wilfredo Delcid MD    ASSISTANT:  Assistant: Esivn Juarez CSA was responsible for performing the following activities: Retraction, Suction, Irrigation, Suturing, Closing, Placing Dressing and Held/Positioned Camera and their skilled assistance was necessary for the success of this case. and their skilled assistance was necessary for the success of this case.    FINDINGS:  Prolapsed cervical fibroid, prior tubal, normal appendix, normal ovaries    EBL:  150cc    URINE OUTPUT:  400cc    IVFS:  400cc    COMPLICATIONS:  none    DRAINS:  none    SPECIMENS: uterus, cervix, tubes, fibroids    ANTIBIOTICS:  Kefzol        OPERATIVE NARRATIVE:    After informed consent was obtained, pt was taken to the OR and placed on table in dorsosupine position.  GETA & OGT were placed without difficulty.      Time out was done.  Antibiotics were given.   Pt was then placed in the dorsolithotomy position after an exam under anesthesia was performed.    The prolapsed cervical fibroid was detached from its' attachment with jocy clamps, excised and handed off.  The pedicles were oversewn with 0-vicryl.    Pt was then prepped and draped in the usual fashion.  The LEELA and florian catheter were placed by the assistant per protocol.    A LUQ 5mm port was placed in the midaxillary, subcostal region without difficulty and the abdominal cavity was insufflated with CO2.  Camera was placed and the pelvic and abdominal cavities were seen in their entirety.  There were no abnormalities.  The LEELA rim was seen clearly circumferentially and the cul-de-sac was clear.  The ureters were visualized bilaterally and found to be well away from the area of intended disection.  The  appendix was normal.     A 5mm port was placed in the R flank area, in the subumbilical area, and a 10-12mm port placed in the L flank area, all without difficulties.  It was through these ports that various instruments were used to complete the procedure.    The L fallopian tube was grasped and detached from the mesosalpinx with the ENDSEAL.  The L round ligament was then divided and a superior pedical was developed and a the pedical made detaching the ovary from the uterus.  The anterior leaf of the broad ligament was then taken down to the area above the bladder.  The posterior leaf was then taken down, the uterine arteries were skeletonized and coagulated with the bipolar bovie.      This was repeated on the R side in an identical fashion without difficulty.      The uterus was then detached from the top of the vagina with the harmonic scalpel and pulled through the vagina to maintain the pneumoperitoneum.  The uterus was handed off to the operating personnel.  Uterine weight pending pathology report.      The cuff was closed with a series of figure of eight 0-vicryl sutures till hemostatic.  The pelvis was irrigated with copious amounts of sterile water till completely clear.  The ureters were reinspected and found to be peristalsing well and were still clear from the area of dissection.  The urine was clear in the florian bag at the termination of the procedure.  The cuff was reinspected and found to be completely hemostatic.    All instruments were removed and the abdomen was deflated. The fascial defect was closed with 0-vicryl in a simple interrupted fashion, the skin was closed with 4-0 monocryl in a subcuticular fashion.      Pt tolerated procedure well and went to the RR in satisfactory condition.    All sponge, instrument and needle counts were correct x 3 according to the OR personnel.     Wilfredo Delcid MD  05/04/23  12:22 EDT

## 2023-05-05 VITALS
RESPIRATION RATE: 18 BRPM | SYSTOLIC BLOOD PRESSURE: 114 MMHG | OXYGEN SATURATION: 100 % | DIASTOLIC BLOOD PRESSURE: 66 MMHG | TEMPERATURE: 97.6 F | HEART RATE: 72 BPM | WEIGHT: 215.2 LBS | BODY MASS INDEX: 39.36 KG/M2

## 2023-05-05 LAB
BASOPHILS # BLD AUTO: 0.03 10*3/MM3 (ref 0–0.2)
BASOPHILS NFR BLD AUTO: 0.3 % (ref 0–1.5)
DEPRECATED RDW RBC AUTO: 42.1 FL (ref 37–54)
EOSINOPHIL # BLD AUTO: 0.16 10*3/MM3 (ref 0–0.4)
EOSINOPHIL NFR BLD AUTO: 1.5 % (ref 0.3–6.2)
ERYTHROCYTE [DISTWIDTH] IN BLOOD BY AUTOMATED COUNT: 13.4 % (ref 12.3–15.4)
HCT VFR BLD AUTO: 34.2 % (ref 34–46.6)
HGB BLD-MCNC: 10.7 G/DL (ref 12–15.9)
IMM GRANULOCYTES # BLD AUTO: 0.02 10*3/MM3 (ref 0–0.05)
IMM GRANULOCYTES NFR BLD AUTO: 0.2 % (ref 0–0.5)
LYMPHOCYTES # BLD AUTO: 2.74 10*3/MM3 (ref 0.7–3.1)
LYMPHOCYTES NFR BLD AUTO: 26.3 % (ref 19.6–45.3)
MCH RBC QN AUTO: 26.5 PG (ref 26.6–33)
MCHC RBC AUTO-ENTMCNC: 31.3 G/DL (ref 31.5–35.7)
MCV RBC AUTO: 84.7 FL (ref 79–97)
MONOCYTES # BLD AUTO: 0.75 10*3/MM3 (ref 0.1–0.9)
MONOCYTES NFR BLD AUTO: 7.2 % (ref 5–12)
NEUTROPHILS NFR BLD AUTO: 6.71 10*3/MM3 (ref 1.7–7)
NEUTROPHILS NFR BLD AUTO: 64.5 % (ref 42.7–76)
PLATELET # BLD AUTO: 290 10*3/MM3 (ref 140–450)
PMV BLD AUTO: 10.1 FL (ref 6–12)
RBC # BLD AUTO: 4.04 10*6/MM3 (ref 3.77–5.28)
WBC NRBC COR # BLD: 10.41 10*3/MM3 (ref 3.4–10.8)

## 2023-05-05 PROCEDURE — G0378 HOSPITAL OBSERVATION PER HR: HCPCS

## 2023-05-05 PROCEDURE — 85025 COMPLETE CBC W/AUTO DIFF WBC: CPT | Performed by: OBSTETRICS & GYNECOLOGY

## 2023-05-05 RX ORDER — OXYCODONE HYDROCHLORIDE AND ACETAMINOPHEN 5; 325 MG/1; MG/1
1 TABLET ORAL EVERY 6 HOURS PRN
Qty: 12 TABLET | Refills: 0 | Status: SHIPPED | OUTPATIENT
Start: 2023-05-05 | End: 2023-05-14

## 2023-05-05 RX ORDER — POLYETHYLENE GLYCOL 3350 17 G/17G
17 POWDER, FOR SOLUTION ORAL DAILY
Qty: 7 EACH | Refills: 0 | Status: SHIPPED | OUTPATIENT
Start: 2023-05-05

## 2023-05-05 RX ORDER — IBUPROFEN 800 MG/1
800 TABLET ORAL EVERY 8 HOURS PRN
Qty: 30 TABLET | Refills: 0 | Status: SHIPPED | OUTPATIENT
Start: 2023-05-05

## 2023-05-05 RX ORDER — PSEUDOEPHEDRINE HCL 30 MG
100 TABLET ORAL 2 TIMES DAILY
Qty: 10 EACH | Refills: 0 | Status: SHIPPED | OUTPATIENT
Start: 2023-05-05

## 2023-05-05 NOTE — NURSING NOTE
Discharge instructions went over with pt. Pt verbalizes understanding. All questions answered. Pt discharged home ambulatory no gait issues, with spouse.

## 2023-05-05 NOTE — CASE MANAGEMENT/SOCIAL WORK
Case Management Discharge Note      Final Note: dc home         Selected Continued Care - Discharged on 5/5/2023 Admission date: 5/4/2023 - Discharge disposition: Home or Self Care    Destination    No services have been selected for the patient.              Durable Medical Equipment    No services have been selected for the patient.              Dialysis/Infusion    No services have been selected for the patient.              Home Medical Care    No services have been selected for the patient.              Therapy    No services have been selected for the patient.              Community Resources    No services have been selected for the patient.              Community & DME    No services have been selected for the patient.                       Final Discharge Disposition Code: 01 - home or self-care

## 2023-05-05 NOTE — DISCHARGE SUMMARY
Date of Discharge:  2023    Discharge Diagnosis:   S/p hysterectomy    Problem List:    S/P laparoscopic hysterectomy, BS, cystoscopy, removal prolapsed fibroid 23    Fibroids    History of depression    Previous  section x3, PPS      Presenting Problem/History of Present Illness  Previous  section [Z98.891]  Fibroids [D21.9]  Fibroid of cervix [D25.9]  Menorrhagia with irregular cycle [N92.1]  Menorrhagia with regular cycle [N92.0]    HPI    ROS:   Review of Systems    Hospital Course        Procedures Performed  Procedure(s):  TOTAL LAPAROSCOPIC HYSTERECTOMY, BILATERAL SALPINGECTOMY, POSSIBLE EXPLORATORY LAPAROTOMY, CERVICAL POLYPECTOMY     This patient has no babies on file.    Consults:   Consults     No orders found for last 30 day(s).          Pertinent Test Results:   Lab Results (last 24 hours)     Procedure Component Value Units Date/Time    CBC & Differential [[id number]]  (Abnormal) Collected: 23    Specimen: Blood from Arm, Left Updated: 23    Narrative:      The following orders were created for panel order CBC & Differential.  Procedure                               Abnormality         Status                     ---------                               -----------         ------                     CBC Auto Differential[[id number]]        Abnormal            Final result                 Please view results for these tests on the individual orders.    CBC Auto Differential [[id number]]  (Abnormal) Collected: 23    Specimen: Blood from Arm, Left Updated: 23     WBC 10.41 10*3/mm3      RBC 4.04 10*6/mm3      Hemoglobin 10.7 g/dL      Hematocrit 34.2 %      MCV 84.7 fL      MCH 26.5 pg      MCHC 31.3 g/dL      RDW 13.4 %      RDW-SD 42.1 fl      MPV 10.1 fL      Platelets 290 10*3/mm3      Neutrophil % 64.5 %      Lymphocyte % 26.3 %      Monocyte % 7.2 %      Eosinophil % 1.5 %      Basophil % 0.3 %      Immature Grans % 0.2 %       Neutrophils, Absolute 6.71 10*3/mm3      Lymphocytes, Absolute 2.74 10*3/mm3      Monocytes, Absolute 0.75 10*3/mm3      Eosinophils, Absolute 0.16 10*3/mm3      Basophils, Absolute 0.03 10*3/mm3      Immature Grans, Absolute 0.02 10*3/mm3     Tissue Pathology Exam [[id number]] Collected: 05/04/23 1055    Specimen: Tissue from Cervix; Tissue from Fallopian Tube, Right; Tissue from Uterus, Cervix, L Fallopian Tube Updated: 05/04/23 1247    hCG, Serum, Qualitative [[id number]]  (Normal) Collected: 05/04/23 0855    Specimen: Blood Updated: 05/04/23 0907     HCG Qualitative Negative          Condition on Discharge:  Satis.  Pt had good return of bowel and bladder function. Dressings dry.    Vital Signs  Temp:  [96 °F (35.6 °C)-98.6 °F (37 °C)] 98.5 °F (36.9 °C)  Heart Rate:  [64-88] 74  Resp:  [14-25] 16  BP: (100-170)/() 100/54    Physical Exam:  Physical Exam      MEDICAL DECISION MAKING:     Discharge Disposition  Home or Self Care    Discharge Medications     Discharge Medications      New Medications      Instructions Start Date   docusate sodium 100 MG capsule   100 mg, Oral, 2 Times Daily      ibuprofen 800 MG tablet  Commonly known as: ADVIL,MOTRIN   800 mg, Oral, Every 8 Hours PRN      oxyCODONE-acetaminophen 5-325 MG per tablet  Commonly known as: PERCOCET   1 tablet, Oral, Every 6 Hours PRN      polyethylene glycol 17 g packet  Commonly known as: MIRALAX   17 g, Oral, Daily         Continue These Medications      Instructions Start Date   Ozempic (0.25 or 0.5 MG/DOSE) 2 MG/1.5ML solution pen-injector  Generic drug: Semaglutide(0.25 or 0.5MG/DOS)   Subcutaneous, Weekly      phentermine 37.5 MG tablet  Commonly known as: ADIPEX-P   No dose, route, or frequency recorded.             Discharge Diet       Activity at Discharge  Activity Instructions     Activity as Tolerated      Pelvic Rest            Follow-up Appointments  Future Appointments   Date Time Provider Department Center   5/24/2023 11:15 AM  Wilfredo Delcid MD [facility]     Additional Instructions for the Follow-ups that You Need to Schedule     Call MD With Problems / Concerns   As directed      Instructions: Instructions after dischage:    No driving for 2 weeks, call for temp>101, heavy vaginal bleeding, increasing lower abdominal pain, not passing gas any more or signs of incisional infection . NOTHING IN THE VAGINA FOR 8 WEEKS, NO EXCEPTIONS. No lifting over 10lbs (gallon of milk). Bath or shower is fine. Bandages will peal off at some time. Any problems call the office 24/7 @ [phone].  Walk as much as possible.  Regular diet    Order Comments: Instructions: Instructions after dischage:  No driving for 2 weeks, call for temp>101, heavy vaginal bleeding, increasing lower abdominal pain, not passing gas any more or signs of incisional infection . NOTHING IN THE VAGINA FOR 8 WEEKS, NO EXCEPTIONS. No lifting over 10lbs (gallon of milk). Bath or shower is fine. Bandages will peal off at some time. Any problems call the office 24/7 @ [phone].  Walk as much as possible.  Regular diet          Discharge Follow-up with Specified Provider: dr delcid; 2 Weeks   As directed      To: dr delcid    Follow Up: 2 Weeks    Follow Up Details: postop check                     Wilfredo Delcid MD  08:08 EDT  5/5/2023

## 2023-05-06 LAB
LAB AP CASE REPORT: NORMAL
PATH REPORT.FINAL DX SPEC: NORMAL
PATH REPORT.GROSS SPEC: NORMAL

## 2023-05-24 ENCOUNTER — OFFICE VISIT (OUTPATIENT)
Dept: OBSTETRICS AND GYNECOLOGY | Facility: CLINIC | Age: 38
End: 2023-05-24
Payer: COMMERCIAL

## 2023-05-24 VITALS
HEIGHT: 62 IN | DIASTOLIC BLOOD PRESSURE: 78 MMHG | BODY MASS INDEX: 37.02 KG/M2 | SYSTOLIC BLOOD PRESSURE: 122 MMHG | WEIGHT: 201.2 LBS

## 2023-05-24 DIAGNOSIS — Z13.89 SCREENING FOR GENITOURINARY CONDITION: Primary | ICD-10-CM

## 2023-05-24 DIAGNOSIS — Z90.710 S/P LAPAROSCOPIC HYSTERECTOMY: ICD-10-CM

## 2023-05-24 LAB
BILIRUB BLD-MCNC: NEGATIVE MG/DL
CLARITY, POC: CLEAR
COLOR UR: YELLOW
GLUCOSE UR STRIP-MCNC: NEGATIVE MG/DL
KETONES UR QL: NEGATIVE
LEUKOCYTE EST, POC: NEGATIVE
NITRITE UR-MCNC: NEGATIVE MG/ML
PH UR: 5 [PH] (ref 5–8)
PROT UR STRIP-MCNC: NEGATIVE MG/DL
RBC # UR STRIP: NEGATIVE /UL
SP GR UR: 1 (ref 1–1.03)
UROBILINOGEN UR QL: NORMAL

## 2023-05-24 RX ORDER — IBUPROFEN 600 MG/1
TABLET ORAL
COMMUNITY

## 2023-05-24 NOTE — PROGRESS NOTES
"3 WK [facility] POSTOP CHECK    S:  Doing well, no complaints.  No bleeding.      O:  /78   Ht 157.5 cm (62.01\")   Wt 91.3 kg (201 lb 3.2 oz)   BMI 36.79 kg/m²     incs look good    Path report reviewed    A:  Doing well postop.    P:  RTO 3 weeks.     Wilfredo Delcid MD  11:27 EDT  05/24/23      "

## 2024-06-25 ENCOUNTER — OFFICE VISIT (OUTPATIENT)
Dept: OBSTETRICS AND GYNECOLOGY | Facility: CLINIC | Age: 39
End: 2024-06-25
Payer: COMMERCIAL

## 2024-06-25 VITALS
SYSTOLIC BLOOD PRESSURE: 120 MMHG | DIASTOLIC BLOOD PRESSURE: 62 MMHG | BODY MASS INDEX: 35.87 KG/M2 | HEIGHT: 61 IN | WEIGHT: 190 LBS

## 2024-06-25 DIAGNOSIS — Z12.72 SMEAR, VAGINAL, AS PART OF ROUTINE GYNECOLOGICAL EXAMINATION: Primary | ICD-10-CM

## 2024-06-25 DIAGNOSIS — Z11.51 SPECIAL SCREENING EXAMINATION FOR HUMAN PAPILLOMAVIRUS (HPV): ICD-10-CM

## 2024-06-25 DIAGNOSIS — Z01.419 WELL WOMAN EXAM: ICD-10-CM

## 2024-06-25 DIAGNOSIS — D21.9 FIBROIDS: ICD-10-CM

## 2024-06-25 DIAGNOSIS — Z90.710 S/P LAPAROSCOPIC HYSTERECTOMY: ICD-10-CM

## 2024-06-25 DIAGNOSIS — Z01.419 SMEAR, VAGINAL, AS PART OF ROUTINE GYNECOLOGICAL EXAMINATION: Primary | ICD-10-CM

## 2024-06-25 DIAGNOSIS — Z01.419 ROUTINE GYNECOLOGICAL EXAMINATION: ICD-10-CM

## 2024-06-25 DIAGNOSIS — Z98.891 PREVIOUS CESAREAN SECTION: ICD-10-CM

## 2024-06-25 DIAGNOSIS — N39.46 MIXED INCONTINENCE: ICD-10-CM

## 2024-06-25 NOTE — PROGRESS NOTES
GYN Annual Exam     CC- Here for annual exam   Chief Complaint   Patient presents with    Gynecologic Exam     MMG-0  Colon-0  Dexa-0          Theresa ROA is a 39 y.o.  female who presents for annual well woman exam. Patient's last menstrual period was 2023 (approximate).    Problems in addition to need for annual: PT HAVING SYMPTOMS OF MIXED INCONTINENCE:  Leaks urine on way to bathroom and leaks when she coughs or sneezes.    HPI: History of Present Illness    PMHX:  Patient Active Problem List   Diagnosis    Fibroids    History of depression    S/P laparoscopic hysterectomy, BS, cystoscopy, removal prolapsed fibroid 23    Previous  section x3, PPS    Mixed incontinence   ; otherwise none    OB History          6    Para   5    Term   5       0    AB   1    Living   4         SAB   1    IAB        Ectopic        Molar        Multiple   0    Live Births   5          Obstetric Comments   - , term, 6.8#  2002- , term, 6.12#  2013- 1 LTCS, term 7.0 # - due to BP per pt  2022- RLTCS , 37 weeks, mild preeclampsia               Past Medical History:   Diagnosis Date    Anxiety     Depression     Gestational hypertension     with all pregnancies    Preeclampsia 2022       Past Surgical History:   Procedure Laterality Date     SECTION       SECTION N/A 2022    Procedure:  SECTION REPEAT;  Surgeon: Paris Fitzgerald MD;  Location: [facility] LABOR DELIVERY;  Service: Obstetrics/Gynecology;  Laterality: N/A;     SECTION WITH TUBAL Bilateral 2023    Procedure:  SECTION REPEAT WITH TUBAL,;  Surgeon: Wilfredo Delcid MD;  Location: [facility] OR;  Service: Obstetrics/Gynecology;  Laterality: Bilateral;    CHOLECYSTECTOMY      LAPAROSCOPIC CHOLECYSTECTOMY      TOTAL LAPAROSCOPIC HYSTERECTOMY N/A 2023    Procedure: TOTAL LAPAROSCOPIC HYSTERECTOMY, BILATERAL SALPINGECTOMY, POSSIBLE EXPLORATORY  "LAPAROTOMY, CERVICAL POLYPECTOMY;  Surgeon: Wilfredo Delcid MD;  Location: [facility];  Service: Obstetrics/Gynecology;  Laterality: N/A;    TUBAL ABDOMINAL LIGATION      and reversal    TUBAL REANASTOMOSIS         No current outpatient medications on file.    No Known Allergies    Social History     Tobacco Use    Smoking status: Never    Smokeless tobacco: Never   Vaping Use    Vaping status: Never Used   Substance Use Topics    Alcohol use: Never    Drug use: Never       Theresa ROA  reports that she has never smoked. She has never used smokeless tobacco..       Family History   Problem Relation Age of Onset    Depression Mother     Anxiety disorder Mother     Diabetes Father     Hypertension Father     Heart disease Father     Breast cancer Neg Hx     Ovarian cancer Neg Hx     Uterine cancer Neg Hx     Colon cancer Neg Hx     Deep vein thrombosis Neg Hx     Pulmonary embolism Neg Hx     Malig Hyperthermia Neg Hx        Review of Systems   Constitutional: Negative.    HENT: Negative.     Eyes: Negative.    Respiratory: Negative.     Cardiovascular: Negative.    Gastrointestinal: Negative.    Endocrine: Negative.    Genitourinary:  Positive for difficulty urinating.   Musculoskeletal: Negative.    Skin: Negative.    Allergic/Immunologic: Negative.    Neurological: Negative.    Hematological: Negative.    Psychiatric/Behavioral: Negative.         Patient reports that she is currently experiencing any symptoms of urinary incontinence.      TESTED FOR CHLAMYDIA?    Gardisil indicated? (9-44yo) 0,2,6 months: yes    EXAM:  /62   Ht 154.9 cm (61\")   Wt 86.2 kg (190 lb)   LMP 04/20/2023 (Approximate)   Breastfeeding No   BMI 35.90 kg/m²     Physical Exam  Vitals and nursing note reviewed. Exam conducted with a chaperone present.   Constitutional:       General: She is not in acute distress.     Appearance: She is well-developed. She is not diaphoretic.   HENT:      Head: Normocephalic and " atraumatic.      Nose: Nose normal.   Eyes:      Extraocular Movements: Extraocular movements intact.   Cardiovascular:      Rate and Rhythm: Normal rate.   Pulmonary:      Effort: Pulmonary effort is normal.   Chest:   Breasts:     Breasts are symmetrical.      Right: Normal. No mass, nipple discharge, skin change or tenderness.      Left: Normal. No mass, nipple discharge, skin change or tenderness.   Abdominal:      General: There is no distension.      Palpations: Abdomen is soft. There is no mass.      Tenderness: There is no abdominal tenderness. There is no guarding.   Genitourinary:     General: Normal vulva.      Pubic Area: No rash.       Vagina: Normal. No vaginal discharge.      Cervix: Normal.      Uterus: Normal.       Adnexa: Right adnexa normal and left adnexa normal.   Musculoskeletal:         General: No tenderness or deformity. Normal range of motion.      Cervical back: Normal range of motion.   Lymphadenopathy:      Upper Body:      Right upper body: No axillary adenopathy.      Left upper body: No axillary adenopathy.   Skin:     General: Skin is warm and dry.      Coloration: Skin is not pale.      Findings: No erythema or rash.   Neurological:      Mental Status: She is alert and oriented to person, place, and time.   Psychiatric:         Behavior: Behavior normal.         Thought Content: Thought content normal.         Judgment: Judgment normal.         Labs:   Lab Results (last 24 hours)       Procedure Component Value Units Date/Time    POC Urinalysis Dipstick [[id number]] Collected: 06/25/24 0916    Specimen: Urine Updated: 06/25/24 0916     Color Yellow     Clarity, UA Clear     Glucose, UA Negative mg/dL      Bilirubin Negative     Ketones, UA Negative     Specific Gravity  1.005     Blood, UA Negative     pH, Urine 5.0     Protein, POC Negative mg/dL      Urobilinogen, UA Normal     Leukocytes Negative     Nitrite, UA Negative                As part of wellness and prevention, the  following topics were discussed with the patient where appropriate: healthy weight, substance abuse/misuse, mental health, encouraging self breast exam, and other counseling and guidance done:  Nutrition, physical activity, healthy weight, injury prevention, misuse of tobacco, alcohol and drugs, sexual behavior and STDs, contraception, dental health, mental health, immunizations breast cancer screening and exams.    Assessment/Plan:     1) GYN annual well woman exam.   2) PAP done today? Yes  3) problems addressed: yes           Follow up prn or one year.    Pt instructed to call for results of any testing done today and that failure to call if she has not heard from us could result in inadequate treatment.  Pt verbalized her understanding.     Assessment & Plan  S/P laparoscopic hysterectomy, BS, cystoscopy, removal prolapsed fibroid 23    Fibroids    Routine gynecological examination    Smear, vaginal, as part of routine gynecological examination    Special screening examination for human papillomavirus (HPV)    Mixed incontinence    Well woman exam    Previous  section x3, PPS      Orders Placed This Encounter   Procedures    POC Urinalysis Dipstick    Cystometrogram    IGP, Apt HPV,rfx 16 / 18,45              RTO Return in about 1 year (around 2025) for Annual physical.        Wilfredo Delcid MD  24  09:49 EDT

## 2024-06-27 LAB
CYTOLOGIST CVX/VAG CYTO: NORMAL
CYTOLOGY CVX/VAG DOC CYTO: NORMAL
CYTOLOGY CVX/VAG DOC THIN PREP: NORMAL
DX ICD CODE: NORMAL
HPV I/H RISK 4 DNA CVX QL PROBE+SIG AMP: NEGATIVE
Lab: NORMAL
OTHER STN SPEC: NORMAL
STAT OF ADQ CVX/VAG CYTO-IMP: NORMAL

## (undated) DEVICE — SPNG GZ WOVN 4X4IN 12PLY 10/BX STRL

## (undated) DEVICE — PREP SOL POVIDONE/IODINE BT 4OZ

## (undated) DEVICE — HYDROGEL COATED LATEX URINE METER FOLEY TRAY,16 FR/CH (5.3 MM), 5 ML CATHETER PRE-CONNECTED TO 2000 ML DRAINAGE BAG WITH NEEDLE SAMPLING: Brand: DOVER

## (undated) DEVICE — SPNG LAP 18X18IN LF STRL PK/5

## (undated) DEVICE — SUT VIC 0 CT1 CR8 DYED JJ31G

## (undated) DEVICE — TBG PENCL TELESCP MEGADYNE SMOKE EVAC 10FT

## (undated) DEVICE — APPL CHLORAPREP HI/LITE 26ML ORNG

## (undated) DEVICE — GOWN,PREVENTION PLUS,XLNG/XXLARGE,STRL: Brand: MEDLINE

## (undated) DEVICE — OCCL COLPO PNEUMO  STRL

## (undated) DEVICE — APPL CHLORAPREP W/TINT 26ML ORNG

## (undated) DEVICE — SUT GUT CHRM 2/0 CT1 36IN 923H

## (undated) DEVICE — SAFELINER SUCTION CANISTER 1000CC: Brand: DEROYAL

## (undated) DEVICE — TROCAR: Brand: KII FIOS FIRST ENTRY

## (undated) DEVICE — DRSNG PAD ABD 8X10IN STRL

## (undated) DEVICE — PK PROC MAJ 90

## (undated) DEVICE — SOL ANTISEP SCRB PVPI 7.5PCT 4OZ

## (undated) DEVICE — SUT VIC 0 CT1 36IN J946H

## (undated) DEVICE — NDL HYPO PRECISIONGLIDE REG 25G 1 1/2

## (undated) DEVICE — SUT GUT CHRM 0 CTX 36IN 904H BX/36

## (undated) DEVICE — PATIENT RETURN ELECTRODE, SINGLE-USE, CONTACT QUALITY MONITORING, ADULT, WITH 9FT CORD, FOR PATIENTS WEIGING OVER 33LBS. (15KG): Brand: MEGADYNE

## (undated) DEVICE — LAPAROSCOPIC SCOPE WARMER: Brand: DEROYAL

## (undated) DEVICE — SUT VIC 0 TIES 18IN J912G

## (undated) DEVICE — GLV SURG SENSICARE ORTHO PF LF 7 STRL

## (undated) DEVICE — CONMED REFLEX RHS SINGLE USE, RELOADABLE, ROTATING HEAD SKIN STAPLER: Brand: CONMED REFLEX

## (undated) DEVICE — IRRIGATOR BULB ASEPTO 60CC STRL

## (undated) DEVICE — PK TLH 90

## (undated) DEVICE — TRY SKINPREP DRYPREP

## (undated) DEVICE — POOLE SUCTION INSTRUMENT WITH REMOVABLE SHEATH: Brand: POOLE

## (undated) DEVICE — CONTAINER,SPECIMEN,OR STERILE,4OZ: Brand: MEDLINE

## (undated) DEVICE — ENDOPATH XCEL BLADELESS TROCARS WITH STABILITY SLEEVES: Brand: ENDOPATH XCEL

## (undated) DEVICE — MANIP UTER RUMI TP 6.7MM 10CM GRN

## (undated) DEVICE — HARMONIC ACE +7 LAPAROSCOPIC SHEARS ADVANCED HEMOSTASIS 5MM DIAMETER 36CM SHAFT LENGTH  FOR USE WITH GRAY HAND PIECE ONLY: Brand: HARMONIC ACE

## (undated) DEVICE — GLV SURG NEOLON 2G PF LF 6.5 STRL

## (undated) DEVICE — PK C/SECT 40

## (undated) DEVICE — TP CLTH MEDIPORE SFT 4IN 10YD

## (undated) DEVICE — SUT MNCRYL 4/0 PS2 18 IN

## (undated) DEVICE — SUT GUT CHRM 0 SUTUPAK TIES 18IN SG14T

## (undated) DEVICE — DRP Z/FRICTION 10X16IN

## (undated) DEVICE — LAPAROSCOPIC SMOKE FILTRATION SYSTEM: Brand: PALL LAPAROSHIELD® PLUS LAPAROSCOPIC SMOKE FILTRATION SYSTEM

## (undated) DEVICE — SOL IRR H2O BTL 1000ML STRL

## (undated) DEVICE — GLV SURG SENSICARE W/ALOE PF LF 7.5 STRL

## (undated) DEVICE — SUCTION CANISTER, 1000CC,SAFELINER: Brand: DEROYAL

## (undated) DEVICE — TOTAL TRAY, DB, 100% SILI FOLEY, 16FR 10: Brand: MEDLINE

## (undated) DEVICE — ENSEAL X1 STRAIGHT 25CM SHAFT: Brand: HARMONIC

## (undated) DEVICE — TRAP FLD MINIVAC MEGADYNE 100ML

## (undated) DEVICE — TROCAR: Brand: KII SLEEVE

## (undated) DEVICE — KIWI® VACUUM DELIVERY SYSTEM, OMNICUP® WITH TRACTION FORCE INDICATOR: Brand: KIWI® OMNICUP®

## (undated) DEVICE — UNDYED BRAIDED (POLYGLACTIN 910), SYNTHETIC ABSORBABLE SUTURE: Brand: COATED VICRYL

## (undated) DEVICE — SUT VIC 0 CTX 36IN J978H

## (undated) DEVICE — SAFESECURE,SECUREMENT,FOLEY CATH,STERILE: Brand: MEDLINE

## (undated) DEVICE — ANTIBACTERIAL UNDYED BRAIDED (POLYGLACTIN 910), SYNTHETIC ABSORBABLE SUTURE: Brand: COATED VICRYL

## (undated) DEVICE — PREMIUM DRY TRAY LF: Brand: MEDLINE INDUSTRIES, INC.

## (undated) DEVICE — SUTURING DEVICE: Brand: ENDO STITCH

## (undated) DEVICE — GLV SURG SENSICARE PI MIC PF SZ7.5 LF STRL

## (undated) DEVICE — TOWEL,OR,DSP,ST,BLUE,STD,4/PK,20PK/CS: Brand: MEDLINE

## (undated) DEVICE — TP SXN YANKR BULB TP